# Patient Record
Sex: FEMALE | Race: WHITE | Employment: FULL TIME | URBAN - METROPOLITAN AREA
[De-identification: names, ages, dates, MRNs, and addresses within clinical notes are randomized per-mention and may not be internally consistent; named-entity substitution may affect disease eponyms.]

---

## 2017-03-14 PROBLEM — M75.42 IMPINGEMENT SYNDROME OF LEFT SHOULDER: Status: ACTIVE | Noted: 2017-03-14

## 2018-04-03 ENCOUNTER — TELEPHONE (OUTPATIENT)
Dept: ENT CLINIC | Age: 48
End: 2018-04-03

## 2018-04-03 ENCOUNTER — OFFICE VISIT (OUTPATIENT)
Dept: ENT CLINIC | Age: 48
End: 2018-04-03
Payer: COMMERCIAL

## 2018-04-03 VITALS
DIASTOLIC BLOOD PRESSURE: 80 MMHG | SYSTOLIC BLOOD PRESSURE: 138 MMHG | HEIGHT: 62 IN | WEIGHT: 178.2 LBS | HEART RATE: 81 BPM | BODY MASS INDEX: 32.79 KG/M2

## 2018-04-03 DIAGNOSIS — D10.39: Primary | ICD-10-CM

## 2018-04-03 DIAGNOSIS — J32.4 CHRONIC PANSINUSITIS: ICD-10-CM

## 2018-04-03 PROCEDURE — 99204 OFFICE O/P NEW MOD 45 MIN: CPT | Performed by: OTOLARYNGOLOGY

## 2018-04-03 ASSESSMENT — ENCOUNTER SYMPTOMS
VOMITING: 0
NAUSEA: 0
SPUTUM PRODUCTION: 0
SORE THROAT: 0
SINUS PAIN: 0

## 2018-04-06 RX ORDER — VALACYCLOVIR HYDROCHLORIDE 1 G/1
TABLET, FILM COATED ORAL
Qty: 4 TABLET | Refills: 5 | Status: SHIPPED | OUTPATIENT
Start: 2018-04-06 | End: 2018-11-05 | Stop reason: SDUPTHER

## 2018-10-11 ENCOUNTER — TELEPHONE (OUTPATIENT)
Dept: ENT CLINIC | Age: 48
End: 2018-10-11

## 2018-11-05 DIAGNOSIS — Z12.31 ENCOUNTER FOR SCREENING MAMMOGRAM FOR MALIGNANT NEOPLASM OF BREAST: Primary | ICD-10-CM

## 2018-11-05 RX ORDER — VALACYCLOVIR HYDROCHLORIDE 1 G/1
TABLET, FILM COATED ORAL
Qty: 4 TABLET | Refills: 5 | Status: SHIPPED | OUTPATIENT
Start: 2018-11-05 | End: 2019-04-10 | Stop reason: SDUPTHER

## 2018-11-20 ENCOUNTER — TELEPHONE (OUTPATIENT)
Dept: FAMILY MEDICINE CLINIC | Age: 48
End: 2018-11-20

## 2018-11-26 ENCOUNTER — TELEPHONE (OUTPATIENT)
Dept: FAMILY MEDICINE CLINIC | Age: 48
End: 2018-11-26

## 2018-11-26 DIAGNOSIS — Z12.39 SCREENING FOR BREAST CANCER: Primary | ICD-10-CM

## 2018-12-04 ENCOUNTER — OFFICE VISIT (OUTPATIENT)
Dept: FAMILY MEDICINE CLINIC | Age: 48
End: 2018-12-04
Payer: COMMERCIAL

## 2018-12-04 VITALS
DIASTOLIC BLOOD PRESSURE: 88 MMHG | HEART RATE: 67 BPM | WEIGHT: 183 LBS | BODY MASS INDEX: 33.68 KG/M2 | SYSTOLIC BLOOD PRESSURE: 128 MMHG | TEMPERATURE: 98.1 F | RESPIRATION RATE: 16 BRPM | OXYGEN SATURATION: 100 % | HEIGHT: 62 IN

## 2018-12-04 DIAGNOSIS — G89.29 CHRONIC PAIN OF LEFT KNEE: ICD-10-CM

## 2018-12-04 DIAGNOSIS — Z71.6 TOBACCO ABUSE COUNSELING: Primary | ICD-10-CM

## 2018-12-04 DIAGNOSIS — M25.562 CHRONIC PAIN OF LEFT KNEE: ICD-10-CM

## 2018-12-04 DIAGNOSIS — R63.5 WEIGHT GAIN: ICD-10-CM

## 2018-12-04 DIAGNOSIS — M17.12 PRIMARY OSTEOARTHRITIS OF LEFT KNEE: ICD-10-CM

## 2018-12-04 PROCEDURE — 99213 OFFICE O/P EST LOW 20 MIN: CPT | Performed by: FAMILY MEDICINE

## 2018-12-04 PROCEDURE — 20610 DRAIN/INJ JOINT/BURSA W/O US: CPT | Performed by: FAMILY MEDICINE

## 2018-12-04 RX ORDER — METHYLPREDNISOLONE ACETATE 80 MG/ML
80 INJECTION, SUSPENSION INTRA-ARTICULAR; INTRALESIONAL; INTRAMUSCULAR; SOFT TISSUE ONCE
Status: COMPLETED | OUTPATIENT
Start: 2018-12-04 | End: 2018-12-04

## 2018-12-04 RX ORDER — VARENICLINE TARTRATE 25 MG
KIT ORAL
Qty: 1 EACH | Refills: 2 | Status: SHIPPED | OUTPATIENT
Start: 2018-12-04 | End: 2019-04-10

## 2018-12-04 RX ADMIN — METHYLPREDNISOLONE ACETATE 80 MG: 80 INJECTION, SUSPENSION INTRA-ARTICULAR; INTRALESIONAL; INTRAMUSCULAR; SOFT TISSUE at 10:28

## 2018-12-04 ASSESSMENT — PATIENT HEALTH QUESTIONNAIRE - PHQ9
SUM OF ALL RESPONSES TO PHQ9 QUESTIONS 1 & 2: 0
SUM OF ALL RESPONSES TO PHQ QUESTIONS 1-9: 0
SUM OF ALL RESPONSES TO PHQ QUESTIONS 1-9: 0
1. LITTLE INTEREST OR PLEASURE IN DOING THINGS: 0
2. FEELING DOWN, DEPRESSED OR HOPELESS: 0

## 2018-12-04 ASSESSMENT — ENCOUNTER SYMPTOMS
APNEA: 0
CHEST TIGHTNESS: 0

## 2018-12-28 ENCOUNTER — HOSPITAL ENCOUNTER (OUTPATIENT)
Dept: MAMMOGRAPHY | Age: 48
Discharge: HOME | End: 2018-12-30
Payer: COMMERCIAL

## 2018-12-28 DIAGNOSIS — Z12.39 SCREENING FOR BREAST CANCER: ICD-10-CM

## 2018-12-28 PROCEDURE — 77063 BREAST TOMOSYNTHESIS BI: CPT

## 2019-01-04 ENCOUNTER — TELEPHONE (OUTPATIENT)
Dept: FAMILY MEDICINE CLINIC | Age: 49
End: 2019-01-04

## 2019-03-01 ENCOUNTER — TELEPHONE (OUTPATIENT)
Dept: FAMILY MEDICINE CLINIC | Age: 49
End: 2019-03-01

## 2019-03-16 ENCOUNTER — HOSPITAL ENCOUNTER (OUTPATIENT)
Age: 49
Discharge: HOME | End: 2019-03-16
Payer: COMMERCIAL

## 2019-03-16 DIAGNOSIS — R63.5 WEIGHT GAIN: ICD-10-CM

## 2019-03-16 LAB
ALBUMIN SERPL-MCNC: 4.3 G/DL (ref 3.5–5.2)
ALP BLD-CCNC: 84 U/L (ref 35–104)
ALT SERPL-CCNC: 33 U/L (ref 0–32)
ANION GAP SERPL CALCULATED.3IONS-SCNC: 10 MMOL/L (ref 7–16)
AST SERPL-CCNC: 30 U/L (ref 0–31)
BILIRUB SERPL-MCNC: 0.4 MG/DL (ref 0–1.2)
BUN BLDV-MCNC: 12 MG/DL (ref 6–20)
CALCIUM SERPL-MCNC: 9.3 MG/DL (ref 8.6–10.2)
CHLORIDE BLD-SCNC: 107 MMOL/L (ref 98–107)
CHOLESTEROL, TOTAL: 199 MG/DL (ref 0–199)
CO2: 26 MMOL/L (ref 22–29)
CREAT SERPL-MCNC: 0.8 MG/DL (ref 0.5–1)
GFR AFRICAN AMERICAN: >60
GFR NON-AFRICAN AMERICAN: >60 ML/MIN/1.73
GLUCOSE BLD-MCNC: 107 MG/DL (ref 74–99)
HDLC SERPL-MCNC: 85 MG/DL
LDL CHOLESTEROL CALCULATED: 104 MG/DL (ref 0–99)
POTASSIUM SERPL-SCNC: 4.3 MMOL/L (ref 3.5–5)
SODIUM BLD-SCNC: 143 MMOL/L (ref 132–146)
T4 FREE: 0.96 NG/DL (ref 0.93–1.7)
TOTAL PROTEIN: 7.8 G/DL (ref 6.4–8.3)
TRIGL SERPL-MCNC: 52 MG/DL (ref 0–149)
TSH SERPL DL<=0.05 MIU/L-ACNC: 0.89 UIU/ML (ref 0.27–4.2)
VLDLC SERPL CALC-MCNC: 10 MG/DL

## 2019-03-16 PROCEDURE — 84443 ASSAY THYROID STIM HORMONE: CPT

## 2019-03-16 PROCEDURE — 80061 LIPID PANEL: CPT

## 2019-03-16 PROCEDURE — 84439 ASSAY OF FREE THYROXINE: CPT

## 2019-03-16 PROCEDURE — 36415 COLL VENOUS BLD VENIPUNCTURE: CPT

## 2019-03-16 PROCEDURE — 80053 COMPREHEN METABOLIC PANEL: CPT

## 2019-04-02 ENCOUNTER — TELEPHONE (OUTPATIENT)
Dept: FAMILY MEDICINE CLINIC | Age: 49
End: 2019-04-02

## 2019-04-10 ENCOUNTER — OFFICE VISIT (OUTPATIENT)
Dept: FAMILY MEDICINE CLINIC | Age: 49
End: 2019-04-10
Payer: COMMERCIAL

## 2019-04-10 VITALS
BODY MASS INDEX: 34.23 KG/M2 | DIASTOLIC BLOOD PRESSURE: 80 MMHG | RESPIRATION RATE: 16 BRPM | WEIGHT: 186 LBS | HEART RATE: 73 BPM | OXYGEN SATURATION: 99 % | TEMPERATURE: 98.3 F | HEIGHT: 62 IN | SYSTOLIC BLOOD PRESSURE: 130 MMHG

## 2019-04-10 DIAGNOSIS — N60.19 FIBROCYSTIC BREAST DISEASE (FCBD), UNSPECIFIED LATERALITY: ICD-10-CM

## 2019-04-10 DIAGNOSIS — B00.1 RECURRENT COLD SORES: Primary | ICD-10-CM

## 2019-04-10 PROCEDURE — 99213 OFFICE O/P EST LOW 20 MIN: CPT | Performed by: FAMILY MEDICINE

## 2019-04-10 RX ORDER — VALACYCLOVIR HYDROCHLORIDE 1 G/1
TABLET, FILM COATED ORAL
Qty: 4 TABLET | Refills: 5 | Status: SHIPPED | OUTPATIENT
Start: 2019-04-10 | End: 2019-09-20 | Stop reason: SDUPTHER

## 2019-04-10 ASSESSMENT — PATIENT HEALTH QUESTIONNAIRE - PHQ9
1. LITTLE INTEREST OR PLEASURE IN DOING THINGS: 0
SUM OF ALL RESPONSES TO PHQ QUESTIONS 1-9: 1
2. FEELING DOWN, DEPRESSED OR HOPELESS: 1
SUM OF ALL RESPONSES TO PHQ9 QUESTIONS 1 & 2: 1
SUM OF ALL RESPONSES TO PHQ QUESTIONS 1-9: 1

## 2019-04-10 ASSESSMENT — ENCOUNTER SYMPTOMS
ABDOMINAL DISTENTION: 0
APNEA: 0
CHEST TIGHTNESS: 0
EYE DISCHARGE: 0
EYE ITCHING: 0

## 2019-04-10 NOTE — PROGRESS NOTES
HPI:  Patient comes in today for   Chief Complaint   Patient presents with    Breast Pain     tenderness under left arm, and sides of breasts. pt did have a mammogram   .          Review of Systems  Review of Systems   Constitutional: Negative for activity change and appetite change. Eyes: Negative for discharge and itching. Respiratory: Negative for apnea and chest tightness. Cardiovascular: Negative for chest pain. Gastrointestinal: Negative for abdominal distention. Musculoskeletal: Negative for arthralgias. Skin:        Left forearm lipoma       PE:  VS:  /80   Pulse 73   Temp 98.3 °F (36.8 °C) (Oral)   Resp 16   Ht 5' 2\" (1.575 m)   Wt 186 lb (84.4 kg)   SpO2 99%   BMI 34.02 kg/m²   Physical Exam   Constitutional: She appears well-developed. HENT:   Head: Normocephalic. Eyes: Pupils are equal, round, and reactive to light. Neck: Normal range of motion. No thyromegaly present. Cardiovascular: Normal rate. Pulmonary/Chest: Effort normal.   Abdominal: Soft. She exhibits no distension. There is no tenderness. Musculoskeletal: Normal range of motion. Neurological: She is alert. Skin: Skin is warm. Capillary refill takes less than 2 seconds. Psychiatric: She has a normal mood and affect. ASSESSMENT/PLAN:    1. Recurrent cold sores  - valACYclovir (VALTREX) 1 g tablet; take 2 tablets by mouth twice a day if needed  Dispense: 4 tablet; Refill: 5    2. Fibrocystic breast disease (FCBD), unspecified laterality               STEPHENIE Dent.

## 2019-09-20 DIAGNOSIS — B00.1 RECURRENT COLD SORES: ICD-10-CM

## 2019-09-20 RX ORDER — VALACYCLOVIR HYDROCHLORIDE 1 G/1
TABLET, FILM COATED ORAL
Qty: 4 TABLET | Refills: 0 | Status: SHIPPED | OUTPATIENT
Start: 2019-09-20 | End: 2019-11-20 | Stop reason: SDUPTHER

## 2019-11-20 DIAGNOSIS — B00.1 RECURRENT COLD SORES: ICD-10-CM

## 2019-11-20 RX ORDER — VALACYCLOVIR HYDROCHLORIDE 1 G/1
1000 TABLET, FILM COATED ORAL DAILY
Qty: 4 TABLET | Refills: 0 | Status: SHIPPED | OUTPATIENT
Start: 2019-11-20 | End: 2019-12-13 | Stop reason: SDUPTHER

## 2019-12-13 DIAGNOSIS — B00.1 RECURRENT COLD SORES: ICD-10-CM

## 2019-12-13 RX ORDER — VALACYCLOVIR HYDROCHLORIDE 1 G/1
1000 TABLET, FILM COATED ORAL DAILY
Qty: 4 TABLET | Refills: 0 | Status: SHIPPED | OUTPATIENT
Start: 2019-12-13 | End: 2020-01-07 | Stop reason: SDUPTHER

## 2020-01-07 ENCOUNTER — OFFICE VISIT (OUTPATIENT)
Dept: FAMILY MEDICINE CLINIC | Age: 50
End: 2020-01-07
Payer: COMMERCIAL

## 2020-01-07 VITALS
SYSTOLIC BLOOD PRESSURE: 126 MMHG | WEIGHT: 189 LBS | TEMPERATURE: 98.3 F | DIASTOLIC BLOOD PRESSURE: 72 MMHG | RESPIRATION RATE: 14 BRPM | HEIGHT: 62 IN | HEART RATE: 87 BPM | OXYGEN SATURATION: 97 % | BODY MASS INDEX: 34.78 KG/M2

## 2020-01-07 PROCEDURE — 99396 PREV VISIT EST AGE 40-64: CPT | Performed by: NURSE PRACTITIONER

## 2020-01-07 RX ORDER — VALACYCLOVIR HYDROCHLORIDE 1 G/1
1000 TABLET, FILM COATED ORAL DAILY
Qty: 4 TABLET | Refills: 3 | Status: SHIPPED
Start: 2020-01-07 | End: 2020-04-03

## 2020-01-07 ASSESSMENT — PATIENT HEALTH QUESTIONNAIRE - PHQ9
SUM OF ALL RESPONSES TO PHQ QUESTIONS 1-9: 0
1. LITTLE INTEREST OR PLEASURE IN DOING THINGS: 0
2. FEELING DOWN, DEPRESSED OR HOPELESS: 0
SUM OF ALL RESPONSES TO PHQ QUESTIONS 1-9: 0
SUM OF ALL RESPONSES TO PHQ9 QUESTIONS 1 & 2: 0

## 2020-01-07 NOTE — PROGRESS NOTES
Annual Physical      Dahiana Moran is here for routine yearly physical/preventative visit. Patient has no specific complaints  today. Patient is  generally healthy. Health maintenance reviewed with patient and is not up to date. Patient's past medical, surgical, social and/or family history reviewed, updated in chart, and are non-contributory (unless otherwise stated). Medications and allergies also reviewed and updated in chart. Complains of chronic left knee pain. States recently her knee has been giving out when she is walking. Problem List  Patient Active Problem List   Diagnosis    Knee pain, bilateral    Recurrent cold sores    Maxillary sinusitis    Midline low back pain without sciatica    Tobacco abuse counseling    Chronic pain of left knee    Numbness and tingling of right leg    Impingement syndrome of left shoulder       Prior to Visit Medications    Medication Sig Taking?  Authorizing Provider   valACYclovir (VALTREX) 1 g tablet Take 1 tablet by mouth daily Yes RAPHAEL Calvo - CNP       No Known Allergies    Review of Systems:    All positives are bold    Constitutional:  No fever, no fatigue, no chills, no headaches,  weight change  Dermatology:  No rash, mole-left aereola getting larger, no dry or sensitive skin  ENT:  No cough, no sore throat, no sinus pain, no runny nose, no ear pain  Cardiology:  No chest pain, no palpitations,  leg edema, no shortness of breath, no PND  Respiratory:  No shortness of breath, no orthopnea, no wheezing, no HOGAN, no hemoptysis  Endocrinology:  No polydipsia, no polyuria, no cold intolerance, no heat intolerance, no polyphagia, no hair changes  Gastroenterology:  No dysphagia, no abdominal pain, no nausea, no vomiting, no constipation, no diarrhea,  heartburn  Musculoskeletal:  joint pain, leg cramps, no back pain, no muscle aches  Urology:  No blood in the urine, no urinary frequency, no urinary incontinence, no urinary urgency, no nocturia, no dysuria  Female Reproductive:  No hot flashes, no abnormal vaginal discharge, no pain with menstruation, no pelvic pain  Neurology:  Numbness/tingling third, fourth and fifth toes right foot, no dizziness, no weakness  Psychology:  No depression, no sleep disturbances, no suicidal ideation, no anxiety    Physical Examination      /72   Pulse 87   Temp 98.3 °F (36.8 °C) (Oral)   Resp 14   Ht 5' 2\" (1.575 m)   Wt 189 lb (85.7 kg)   SpO2 97%   BMI 34.57 kg/m²     PHYSICAL EXAMINATION:    General Appearance: alert and oriented to person, place and time, well developed and well- nourished, in no acute distress  Skin: warm and dry, no rash or erythema. Lipoma to left forearm.  Seborrheic keratosis left breast  Head: normocephalic and atraumatic  Eyes: pupils equal, round, and reactive to light, extraocular eye movements intact, conjunctivae normal  ENT: tympanic membrane, external ear and ear canal normal bilaterally, nose without deformity, nasal mucosa and turbinates normal without polyps  Neck: supple and non-tender without mass, no thyromegaly or thyroid nodules, no cervical lymphadenopathy  Pulmonary/Chest: clear to auscultation bilaterally- no wheezes, rales or rhonchi, normal air movement, no respiratory distress  Cardiovascular: normal rate, regular rhythm, normal S1 and S2, no murmurs, rubs, clicks, or gallops, distal pulses intact, no carotid bruits  Abdomen: soft, non-tender, non-distended, normal bowel sounds, no masses or organomegaly  Extremities: no cyanosis, clubbing or edema  Musculoskeletal: left knee is tender along medial joint line, positive Sonya's, negative drawer sign  Neurologic: reflexes normal and symmetric, no cranial nerve deficit, gait, coordination and speech normal           Dx: Z00.00       Plan:  Brenda Kendrick was seen today for annual exam.    Diagnoses and all orders for this visit:    Routine general medical examination at a health care facility  -     [facility] (For External

## 2020-01-15 ENCOUNTER — TELEPHONE (OUTPATIENT)
Dept: FAMILY MEDICINE CLINIC | Age: 50
End: 2020-01-15

## 2020-01-28 ENCOUNTER — HOSPITAL ENCOUNTER (OUTPATIENT)
Dept: MAMMOGRAPHY | Age: 50
Discharge: HOME | End: 2020-01-30
Payer: COMMERCIAL

## 2020-01-28 PROCEDURE — 77063 BREAST TOMOSYNTHESIS BI: CPT

## 2020-02-17 ENCOUNTER — TELEPHONE (OUTPATIENT)
Dept: FAMILY MEDICINE CLINIC | Age: 50
End: 2020-02-17

## 2020-03-13 ENCOUNTER — TELEPHONE (OUTPATIENT)
Dept: FAMILY MEDICINE CLINIC | Age: 50
End: 2020-03-13

## 2020-04-03 RX ORDER — VALACYCLOVIR HYDROCHLORIDE 1 G/1
TABLET, FILM COATED ORAL
Qty: 4 TABLET | Refills: 3 | Status: SHIPPED
Start: 2020-04-03 | End: 2020-12-17

## 2020-04-06 ENCOUNTER — TELEPHONE (OUTPATIENT)
Dept: FAMILY MEDICINE CLINIC | Age: 50
End: 2020-04-06

## 2020-04-13 ENCOUNTER — TELEPHONE (OUTPATIENT)
Dept: FAMILY MEDICINE CLINIC | Age: 50
End: 2020-04-13

## 2020-05-05 ENCOUNTER — TELEPHONE (OUTPATIENT)
Dept: FAMILY MEDICINE CLINIC | Age: 50
End: 2020-05-05

## 2020-06-03 ENCOUNTER — TELEPHONE (OUTPATIENT)
Dept: FAMILY MEDICINE CLINIC | Age: 50
End: 2020-06-03

## 2020-06-04 ENCOUNTER — OFFICE VISIT (OUTPATIENT)
Dept: SURGERY | Age: 50
End: 2020-06-04
Payer: COMMERCIAL

## 2020-06-04 VITALS
WEIGHT: 180 LBS | RESPIRATION RATE: 20 BRPM | OXYGEN SATURATION: 97 % | TEMPERATURE: 98.4 F | SYSTOLIC BLOOD PRESSURE: 138 MMHG | BODY MASS INDEX: 31.89 KG/M2 | HEART RATE: 83 BPM | HEIGHT: 63 IN | DIASTOLIC BLOOD PRESSURE: 84 MMHG

## 2020-06-04 PROCEDURE — 99203 OFFICE O/P NEW LOW 30 MIN: CPT | Performed by: PLASTIC SURGERY

## 2020-06-04 NOTE — PROGRESS NOTES
Department of Plastic Surgery - Adult  Attending Consult Note      CHIEF COMPLAINT:   Mass of left forearm    History Obtained From:  patient    HISTORY OF PRESENT ILLNESS:                The patient is a 44 y.o. female who presents with left forearm subcutaneous mass. The patient states that they first noticed the mass several years ago. It has  grown in size since they first noticed the mass. The mass has not had a history of discharge. The pt has not had the mass biopsied previously. The patient states the mass is  painfull. The pt denies any associated symptoms. Past Medical History:    Past Medical History:   Diagnosis Date    Chronic back pain     Knee pain, bilateral     left knee pain     Past Surgical History:    Past Surgical History:   Procedure Laterality Date    APPENDECTOMY       SECTION       Current Medications:      Current Outpatient Medications   Medication Sig Dispense Refill    valACYclovir (VALTREX) 1 g tablet take 1 tablet by mouth once daily 4 tablet 3     No current facility-administered medications for this visit. Allergies:  Patient has no known allergies. Social History:   Social History     Socioeconomic History    Marital status:      Spouse name: Not on file    Number of children: Not on file    Years of education: Not on file    Highest education level: Not on file   Occupational History    Not on file   Social Needs    Financial resource strain: Not on file    Food insecurity     Worry: Not on file     Inability: Not on file   [employer] needs     Medical: Not on file     Non-medical: Not on file   Tobacco Use    Smoking status: Current Every Day Smoker     Packs/day: 1.00     Years: 20.00     Pack years: 20.00     Types: Cigarettes    Smokeless tobacco: Never Used   Substance and Sexual Activity    Alcohol use:  Yes     Alcohol/week: 0.0 standard drinks     Comment: social    Drug use: No    Sexual activity: Not on file Lifestyle    Physical activity     Days per week: Not on file     Minutes per session: Not on file    Stress: Not on file   Relationships    Social connections     Talks on phone: Not on file     Gets together: Not on file     Attends Islam service: Not on file     Active member of club or organization: Not on file     Attends meetings of clubs or organizations: Not on file     Relationship status: Not on file    Intimate partner violence     Fear of current or ex partner: Not on file     Emotionally abused: Not on file     Physically abused: Not on file     Forced sexual activity: Not on file   Other Topics Concern    Not on file   Social History Narrative    Not on file     Family History:   Family History   Problem Relation Age of Onset    No Known Problems Father        REVIEW OF SYSTEMS:    CONSTITUTIONAL:  negative for  fevers, chills, sweats and fatigue  EYES: negative for dipolpia or acute vision loss. RESPIRATORY:  negative for  dry cough, cough with sputum, dyspnea, wheezing and chest pain  HENT:negative for pain, headache, difficulty swallowing or nose bleeds. CARDIOVASCULAR:  negative for  chest pain, dyspnea, palpitations, syncope  GASTROINTESTINAL:  negative for nausea, vomiting, change in bowel habits, diarrhea, constipation and abdominal pain  EXTREMITIES: negative for edema  MUSCULOSKELETAL: negative for muscle weakness  SKIN: positive for lesionnegative for itching or rashes. HEME: negative for easy brusing or bleeding  BEHAVIOR/PSYCH:  negative for poor appetite, increased appetite, decreased sleep and poor concentration  PSYCH: Alert and oriented to person place and time    I have reviewed the chief complaint and history of present illness including (ROS and PFSH) and vital documentation by my staff and I agree with their documentation and have added when applicable.       PHYSICAL EXAM:          PHYSICAL EXAM:    VITALS:  /84 (Site: Left Upper Arm, Position: Sitting, Cuff -The patient will not require pre-op clearance from their PCP. Surgical Plan: Excisional biopsy of left forearm subcutaneous mass in the office under local anesthesia    Photos obtained      This document is generated, in part, by voice recognition software and thus  syntax and grammatical errors are possible.     Karrie Gonzalez  8:24 AM  6/11/2020

## 2020-06-10 ENCOUNTER — TELEPHONE (OUTPATIENT)
Dept: SURGERY | Age: 50
End: 2020-06-10

## 2020-07-02 ENCOUNTER — TELEPHONE (OUTPATIENT)
Dept: FAMILY MEDICINE CLINIC | Age: 50
End: 2020-07-02

## 2020-07-22 NOTE — PROGRESS NOTES
Subjective: Follow up today for excisional biopsy of left forearm subcutaneous mass. Denies fever, nausea, vomiting, leg pain or swelling. The patient voices understanding of the procedure they are having today and would like to proceed. Objective: There were no vitals taken for this visit. Left forearm subcutaneous mass  Lesion- 30mm x 30mm  Margin- 2mm  Defect- 30mm x 20mm  Scar-30mm         Assessment:    Patient Active Problem List   Diagnosis    Knee pain, bilateral    Recurrent cold sores    Maxillary sinusitis    Midline low back pain without sciatica    Tobacco abuse counseling    Chronic pain of left knee    Numbness and tingling of right leg    Impingement syndrome of left shoulder       Plan:       Diagnosis  -) Left forearm subcutaneous mass      -The risks, benefits and options were discussed with the pt. The risks included but not limited to pain, bleeding, infection, heavy scarring, damage to surrounding structures, fluid collections, asymmetry, and need for further procedures. All of Her questions were answered to their satisfaction and She agrees to proceed with the procedure.      -After consent was obtained, the area was cleansed with an alcohol swab. Local anesthesia consisting of 1% lidocaine with 1:100,000 epinepherine was injected  surrounding the area. The local was allowed to work. Using a 10-blade the Left forearm subcutaneous mass was excised,  intermediate repair was performed. The wound(s) were closed with 3-0 monocryl and 4-0 monocryl hemostasis was obtained and a steristrip with tegaderm dressing was placed over the wound. The procedure was performed by Dr Johnson Reasons     Please schedule an appointment to be seen on Follow-up with our office in 7-14 days  Diet: regular diet  Activity: no heavy lifting for 7 days. Shower/Bathing: OK to shower over the wound site in 48 hours. No baths, hot tubs or soaking of the wound site at this time.   Pt voices understanding. Wound care: There is a dressing in place steri strips and Tegaderm over the wound site. This will remain intact until seen in our office. Medications: As prescribed  Educated to contact physician with concerns or signs of infection (redness, increasing pain, discharge, odor, fever). The entirety of the procedure was performed by Dr. Ritu Lombardi    Please note that the medical decision making for the patient as well as the subjective, objective, assessment and plan were reviewed and performed by Dr Ritu Lombardi        This document is generated, in part, by voice recognition software and thus  syntax and grammatical errors are possible.     Cindi Banuelos  9:32 AM  8/6/2020

## 2020-07-23 ENCOUNTER — OFFICE VISIT (OUTPATIENT)
Dept: FAMILY MEDICINE CLINIC | Age: 50
End: 2020-07-23
Payer: COMMERCIAL

## 2020-07-23 VITALS
DIASTOLIC BLOOD PRESSURE: 86 MMHG | SYSTOLIC BLOOD PRESSURE: 120 MMHG | HEIGHT: 62 IN | BODY MASS INDEX: 34.96 KG/M2 | TEMPERATURE: 97.7 F | HEART RATE: 81 BPM | WEIGHT: 190 LBS | OXYGEN SATURATION: 97 % | RESPIRATION RATE: 16 BRPM

## 2020-07-23 PROCEDURE — 99213 OFFICE O/P EST LOW 20 MIN: CPT | Performed by: FAMILY MEDICINE

## 2020-07-23 PROCEDURE — 20610 DRAIN/INJ JOINT/BURSA W/O US: CPT | Performed by: FAMILY MEDICINE

## 2020-07-23 RX ORDER — METHYLPREDNISOLONE ACETATE 80 MG/ML
80 INJECTION, SUSPENSION INTRA-ARTICULAR; INTRALESIONAL; INTRAMUSCULAR; SOFT TISSUE ONCE
Status: COMPLETED | OUTPATIENT
Start: 2020-07-23 | End: 2020-07-23

## 2020-07-23 RX ORDER — HYDROCODONE BITARTRATE AND ACETAMINOPHEN 5; 325 MG/1; MG/1
1 TABLET ORAL EVERY 6 HOURS PRN
Qty: 28 TABLET | Refills: 0 | Status: SHIPPED | OUTPATIENT
Start: 2020-07-23 | End: 2020-07-30

## 2020-07-23 RX ADMIN — METHYLPREDNISOLONE ACETATE 80 MG: 80 INJECTION, SUSPENSION INTRA-ARTICULAR; INTRALESIONAL; INTRAMUSCULAR; SOFT TISSUE at 09:48

## 2020-07-23 ASSESSMENT — ENCOUNTER SYMPTOMS
SHORTNESS OF BREATH: 0
WHEEZING: 0
CONSTIPATION: 0
ABDOMINAL PAIN: 0
DIARRHEA: 0
BLOOD IN STOOL: 0
APNEA: 0
COUGH: 0
VOMITING: 0
NAUSEA: 0

## 2020-07-23 NOTE — PROGRESS NOTES
HPI:  Patient comes in today for   Chief Complaint   Patient presents with    Knee Pain     pt. presents for left knee pain. patient says the pain came out of nowhere. describes as stabing pain radiating below and above knee. pt. had xray of knee this morning. Review of Systems  Review of Systems   Constitutional: Negative for chills, diaphoresis and fever. HENT: Negative for ear discharge, ear pain, hearing loss, nosebleeds and tinnitus. Respiratory: Negative for apnea, cough, shortness of breath and wheezing. Cardiovascular: Negative for chest pain. Gastrointestinal: Negative for abdominal pain, blood in stool, constipation, diarrhea, nausea and vomiting. Genitourinary: Negative for dysuria, flank pain and hematuria. Musculoskeletal: Positive for arthralgias (Has been having pain in this knee for at least 5 years, off and on.). Negative for myalgias. Skin: Negative for rash. Neurological: Negative for headaches. Hematological: Does not bruise/bleed easily. Psychiatric/Behavioral: Negative for hallucinations and suicidal ideas. PE[de-identified]  /86   Pulse 81   Temp 97.7 °F (36.5 °C) (Temporal)   Resp 16   Ht 5' 2\" (1.575 m)   Wt 190 lb (86.2 kg)   SpO2 97%   BMI 34.75 kg/m²       Physical Exam  Constitutional:       Appearance: Normal appearance. She is well-developed. HENT:      Head: Normocephalic and atraumatic. Eyes:      General: No scleral icterus. Conjunctiva/sclera: Conjunctivae normal.      Pupils: Pupils are equal, round, and reactive to light. Neck:      Musculoskeletal: Neck supple. Thyroid: No thyromegaly. Vascular: No JVD. Trachea: No tracheal deviation. Cardiovascular:      Rate and Rhythm: Normal rate and regular rhythm. Heart sounds: Normal heart sounds. No murmur. No gallop. Pulmonary:      Effort: Pulmonary effort is normal. No respiratory distress. Breath sounds: Normal breath sounds. No wheezing.    Abdominal: Palpations: Abdomen is soft. Musculoskeletal:         General: No tenderness. Comments: Left knee painful at the Medial collateral, and along the medial joint line. ACL is tight on both sides. Skin:     Capillary Refill: Capillary refill takes less than 2 seconds. Findings: No erythema or rash. Neurological:      General: No focal deficit present. Mental Status: She is alert and oriented to person, place, and time. Deep Tendon Reflexes: Reflexes normal.      Comments:              Procedure:  Intra-Articular Injection left Knee:    The patient was counseled on the options for treating the affected knee. These include but were not limited to trail of oral anti-inflammatories, oral steroids, physical therapy referral, or ortho consultation. The patient is electing injection. The risks of the injection were explained, including but not limited to infection, bleeding, bruising, tendon injury, skin contour deformity and soft tissue/fat necrosis. The patient gave verbal permission to proceed. The patient was placed in a seated position. The skin of [facility] was prepped with Betadine swabs. It was allowed to dry. Utilizing a  Lateral approach an injection of 80 mg of  DepoMedrol and 1 cc of 1% lidocaine without epinephrine and 0.5 cc of 0.25% Bupivacaine,was injected into the left KNEE JOINT after first aspirating to assure no blood return. The injection site was then wiped again with Betadine and covered with a band aid. The procedure was tolerated well. Routine wound instructions given verbally to the patient. Advised to notify if bleeding, excessive bruising, or swelling develop. Surgeon: Lisandro Bennett D.O.  ASSESSMENT/PLAN:    1. Chronic pain of left knee  - AL ARTHROCENTESIS ASPIR&/INJ MAJOR JT/BURSA W/O US    2.  Internal derangement of left knee  - AL ARTHROCENTESIS ASPIR&/INJ MAJOR JT/BURSA W/O US  - methylPREDNISolone acetate (DEPO-MEDROL) injection 80 mg  - MRI KNEE LEFT WO CONTRAST; Future    Controlled substances monitoring: no signs of potential drug abuse or diversion identified and [place] report reviewed today- activity consistent with treatment plan. STEPHENIE Marlow.

## 2020-07-31 ENCOUNTER — HOSPITAL ENCOUNTER (OUTPATIENT)
Age: 50
Discharge: HOME | End: 2020-08-02
Payer: COMMERCIAL

## 2020-07-31 ENCOUNTER — PROCEDURE VISIT (OUTPATIENT)
Dept: SURGERY | Age: 50
End: 2020-07-31
Payer: COMMERCIAL

## 2020-07-31 VITALS
HEART RATE: 78 BPM | WEIGHT: 185 LBS | TEMPERATURE: 98.4 F | OXYGEN SATURATION: 98 % | BODY MASS INDEX: 34.04 KG/M2 | HEIGHT: 62 IN | RESPIRATION RATE: 18 BRPM

## 2020-07-31 PROCEDURE — 88304 TISSUE EXAM BY PATHOLOGIST: CPT

## 2020-07-31 PROCEDURE — 25071 EXC FOREARM LES SC 3 CM/>: CPT | Performed by: PLASTIC SURGERY

## 2020-07-31 NOTE — PROGRESS NOTES
Lidocaine HCI 1% and Epinephrine 1:100,000 Injection,USP  Lot:     [id number]               Expiration:6/21  XMY:[id number]

## 2020-08-03 ENCOUNTER — TELEPHONE (OUTPATIENT)
Dept: FAMILY MEDICINE CLINIC | Age: 50
End: 2020-08-03

## 2020-08-03 DIAGNOSIS — M25.561 CHRONIC PAIN OF BOTH KNEES: Primary | ICD-10-CM

## 2020-08-03 DIAGNOSIS — M25.562 CHRONIC PAIN OF BOTH KNEES: Primary | ICD-10-CM

## 2020-08-03 DIAGNOSIS — M23.92 INTERNAL DERANGEMENT OF LEFT KNEE: ICD-10-CM

## 2020-08-03 DIAGNOSIS — G89.29 CHRONIC PAIN OF BOTH KNEES: Primary | ICD-10-CM

## 2020-08-04 ENCOUNTER — TELEPHONE (OUTPATIENT)
Dept: FAMILY MEDICINE CLINIC | Age: 50
End: 2020-08-04

## 2020-08-04 NOTE — TELEPHONE ENCOUNTER
MRI was denied by insurance pt would need to try PT for at least 6 weeks before insurance would cover MRI.

## 2020-08-06 RX ORDER — CEPHALEXIN 500 MG/1
500 CAPSULE ORAL 4 TIMES DAILY
Qty: 20 CAPSULE | Refills: 0 | Status: SHIPPED | OUTPATIENT
Start: 2020-08-06 | End: 2020-08-11

## 2020-08-06 RX ORDER — ACETAMINOPHEN AND CODEINE PHOSPHATE 300; 30 MG/1; MG/1
1 TABLET ORAL EVERY 4 HOURS PRN
Qty: 10 TABLET | Refills: 0 | Status: SHIPPED | OUTPATIENT
Start: 2020-08-06 | End: 2020-08-11

## 2020-08-06 RX ORDER — LIDOCAINE HYDROCHLORIDE AND EPINEPHRINE 10; 10 MG/ML; UG/ML
3 INJECTION, SOLUTION INFILTRATION; PERINEURAL ONCE
Status: SHIPPED | OUTPATIENT
Start: 2020-08-06

## 2020-08-06 NOTE — PROGRESS NOTES
Subjective: Follow up today from excisional biopsy of left forearm subcutaneous mass. Denies fever, nausea, vomiting, leg pain or swelling, pain is absent. The pt states that they have  kept the wound site(s) covered with tegaderm. The patient states that they have  finished their antibiotic. They state that their pain is absent. Objective: There were no vitals taken for this visit. Wound: Clean, dry, and intact, no drainage. No signs of infection, wound healing well    Neuro- Sensation  intact to kimi incisional site with light touch .      Assessment:    Patient Active Problem List   Diagnosis    Knee pain, bilateral    Recurrent cold sores    Maxillary sinusitis    Midline low back pain without sciatica    Tobacco abuse counseling    Chronic pain of left knee    Numbness and tingling of right leg    Impingement syndrome of left shoulder       Labs: CBC: No results found for: WBC, RBC, HGB, HCT, MCV, MCH, MCHC, RDW, PLT, MPV  BMP:    Lab Results   Component Value Date     03/16/2019    K 4.3 03/16/2019     03/16/2019    CO2 26 03/16/2019    BUN 12 03/16/2019    LABALBU 4.3 03/16/2019    CREATININE 0.8 03/16/2019    CALCIUM 9.3 03/16/2019    GFRAA >60 03/16/2019    LABGLOM >60 03/16/2019    GLUCOSE 107 03/16/2019         Pathology Report- [address]        [place] [place] [address]     [address]            [address]   [place], [address], [address] [place] [address]               FINAL SURGICAL PATHOLOGY REPORT     NAME:           Treva Rocha          Date of       07/31/2020                                            Collection:   Medical Record   [id number]              Date of       08/03/2020   Number:                                  Receipt:   Age:  46 Y        Sex:  F                Date          08/05/2020 10:18                                            Reported:   Date Of Birth:   1970   Financial        [id number]             Admitting     LINSEY MELGAR   Number:                                  Physician:   Patient          OMAR                   Ordering      LINSEY MELGAR   Location:                                Physician:     Accession Number:  [id number]-                                       Additional Physicians:RAMAN MCCANN       Diagnosis:   Mass, left arm:   Mature adipose tissue, consistent with lipoma. Plan:     Educated the pt on their pathology report. Pt voices understanding      Dressing -None  Showering at this time -OK    Pt was instructed on scar massage  Scar Care Instructions      Sunscreen Recommendations for Scars   We recommend that all patients use a sunscreen when outside but especially on new scars (that are exposed to sunlight) for a year after their procedure.  The SPF should be at least 28. Follow the application directions on the bottle. Why is it so Important to Use Sunscreen on Scars?  A scar is new and more fragile than the surrounding skin.  If you do not use sunscreen, the scar line will react differently to the sun than the surrounding skin.  If you don't use sunscreen, the scar tissue will become darker than surrounding skin. This is a hyper-pigmented scar and will remain darker than the other skin.  After one year, the scar and surrounding skin should react equally to sun. Superficial Scar Massage   Scar massage desensitizes and reduces scar adhesions so skin glides freely.     Rub in a circular motion on and around the scar with firm, even pressure for 5 minutes four times per day    You can start scar massage once incision is completely healed and strong enough to handle the motion (usually 10 - 14

## 2020-08-07 ENCOUNTER — OFFICE VISIT (OUTPATIENT)
Dept: SURGERY | Age: 50
End: 2020-08-07

## 2020-08-07 VITALS
BODY MASS INDEX: 34.96 KG/M2 | OXYGEN SATURATION: 96 % | HEIGHT: 62 IN | SYSTOLIC BLOOD PRESSURE: 132 MMHG | DIASTOLIC BLOOD PRESSURE: 80 MMHG | WEIGHT: 190 LBS | TEMPERATURE: 97.5 F

## 2020-08-07 PROCEDURE — 99024 POSTOP FOLLOW-UP VISIT: CPT | Performed by: PHYSICIAN ASSISTANT

## 2020-08-18 ENCOUNTER — OFFICE VISIT (OUTPATIENT)
Dept: ORTHOPEDIC SURGERY | Age: 50
End: 2020-08-18
Payer: COMMERCIAL

## 2020-08-18 VITALS — TEMPERATURE: 98 F | BODY MASS INDEX: 31.58 KG/M2 | HEIGHT: 64 IN | WEIGHT: 185 LBS

## 2020-08-18 PROCEDURE — 99203 OFFICE O/P NEW LOW 30 MIN: CPT | Performed by: ORTHOPAEDIC SURGERY

## 2020-08-18 NOTE — PROGRESS NOTES
insecurity     Worry: Not on file     Inability: Not on file    Transportation needs     Medical: Not on file     Non-medical: Not on file   Tobacco Use    Smoking status: Current Every Day Smoker     Packs/day: 1.00     Years: 20.00     Pack years: 20.00     Types: Cigarettes    Smokeless tobacco: Never Used   Substance and Sexual Activity    Alcohol use: Yes     Alcohol/week: 0.0 standard drinks     Comment: social    Drug use: No    Sexual activity: Not on file   Lifestyle    Physical activity     Days per week: Not on file     Minutes per session: Not on file    Stress: Not on file   Relationships    Social connections     Talks on phone: Not on file     Gets together: Not on file     Attends Holiness service: Not on file     Active member of club or organization: Not on file     Attends meetings of clubs or organizations: Not on file     Relationship status: Not on file    Intimate partner violence     Fear of current or ex partner: Not on file     Emotionally abused: Not on file     Physically abused: Not on file     Forced sexual activity: Not on file   Other Topics Concern    Not on file   Social History Narrative    Not on file     Family History   Problem Relation Age of Onset    No Known Problems Father          REVIEW OF SYSTEMS:     General/Constitution:  (-)weight loss, (-)fever, (-)chills, (-)weakness. Skin: (-) rash,(-) psoriasis,(-) eczema, (-)skin cancer. Musculoskeletal: (-) fractures,  (-) dislocations,(-) collagen vascular disease, (-) fibromyalgia, (-) multiple sclerosis, (-) muscular dystrophy, (-) RSD,(-) joint pain (-)swelling, (-) joint pain,swelling. Neurologic: (-) epilepsy, (-)seizures,(-) brain tumor,(-) TIA, (-)stroke, (-)headaches, (-)Parkinson disease,(-) memory loss, (-) LOC. Cardiovascular: (-) Chest pain, (-) swelling in legs/feet, (-) SOB, (-) cramping in legs/feet with walking. Respiratory: (-) SOB, (-) Coughing, (-) night sweats.   GI: (-) nausea, (-) vomiting, (-) diarrhea, (-) blood in stool, (-) gastric ulcer. Psychiatric: (-) Depression, (-) Anxiety, (-) bipolar disease, (-) Alzheimer's Disease  Allergic/Immunologic: (-) allergies latex, (-) allergies metal, (-) skin sensitivity. Hematlogic: (-) anemia, (-) blood transfusion, (-) DVT/PE, (-) Clotting disorders    Subjective:    Constitution:  Temp 98 °F (36.7 °C)   Ht 5' 4\" (1.626 m)   Wt 185 lb (83.9 kg)   BMI 31.76 kg/m²     Psycihatric:  The patient is alert and oriented x 3, appears to be stated age and in no distress. Respiratory:  Respiratory effort is not labored. Patient is not gasping. Palpation of the chest reveals no tactile fremitus. Skin:  Upon inspection: the skin appears warm, dry and intact. There is  a previous scar over the affected area. There is any cellulitis, lymphedema or cutaneous lesions noted in the lower extremities. Upon palpation there is no induration noted. Neurologic:  Gait: antalgic; Motor exam of the lower extremities show ; quadriceps, hamstrings, foot dorsi and plantar flexors intact R.  5/5 and L. 5/5. Deep tendon reflexes are 2/4 at the knees and 2/4 at the ankles with strong extensor hallicus longus motor strength bilaterally. Sensory to both feet is intact to all sensory roots. Cardiovascular: The vascular exam is normal and is well perfused to distal extremities. Distal pulses DP/PT: R. 2+; L. 2+. There is cap refill noted less than two seconds in all digits. There is not edema of the bilateral lower extremities. There is not varicosities noted in the distal extremities. Lymph:  Upon palpation,  there is no lymphadenopathy noted in bilateral lower extremities. Musculoskeletal:  Gait: antalgic; examination of the nails and digits reveal no cyanosis or clubbing. Lumbar exam:  On visual inspection, there is not deformity of the spine. full range of motion, no tenderness, palpable spasm or pain on motion.  Special tests: Straight

## 2020-08-28 ENCOUNTER — NURSE ONLY (OUTPATIENT)
Dept: ORTHOPEDIC SURGERY | Age: 50
End: 2020-08-28
Payer: COMMERCIAL

## 2020-08-28 PROCEDURE — 20610 DRAIN/INJ JOINT/BURSA W/O US: CPT | Performed by: NURSE PRACTITIONER

## 2020-08-28 NOTE — PROGRESS NOTES
Chief Complaint   Patient presents with    Injections     left knee orthovisc #1       Verbal and written consent was obtained by the patient. The following is a well known patient to me that is here for Orthovisc #4, Her knee was prepped in sterile fashion. Orthovisc 15 mg/ml injected into the Left . The patient tolerated the injection well and I will see the patient back as needed    Marta Adriana was seen today for injections.     Diagnoses and all orders for this visit:    Primary osteoarthritis of left knee  -     20610 - [place] DRAIN/INJECT LARGE JOINT/BURSA    Other orders  -     hyaluronan (ORTHOVISC) 30 MG/2ML injection 30 mg

## 2020-09-03 ENCOUNTER — OFFICE VISIT (OUTPATIENT)
Dept: ORTHOPEDIC SURGERY | Age: 50
End: 2020-09-03
Payer: COMMERCIAL

## 2020-09-03 PROCEDURE — 20610 DRAIN/INJ JOINT/BURSA W/O US: CPT | Performed by: ORTHOPAEDIC SURGERY

## 2020-09-03 NOTE — PROGRESS NOTES
Chief Complaint   Patient presents with    Injections     left knee orthovisc#2       Verbal and written consent was obtained by the patient. The following is a well known patient to me that is here for Orthovisc #2, Her knee was prepped in sterile fashion. Orthovisc 15 mg/ml injected into the Left . The patient tolerated the injection well and I will see the patient back in 1 week    Jacklyn Rosen was seen today for injections.     Diagnoses and all orders for this visit:    Primary osteoarthritis of left knee  -     MD ARTHROCENTESIS ASPIR&/INJ MAJOR JT/BURSA W/O US    Other orders  -     hyaluronan (ORTHOVISC) 30 MG/2ML injection 30 mg

## 2020-09-10 ENCOUNTER — OFFICE VISIT (OUTPATIENT)
Dept: ORTHOPEDIC SURGERY | Age: 50
End: 2020-09-10
Payer: COMMERCIAL

## 2020-09-10 PROCEDURE — 20610 DRAIN/INJ JOINT/BURSA W/O US: CPT | Performed by: ORTHOPAEDIC SURGERY

## 2020-09-10 NOTE — PROGRESS NOTES
Chief Complaint   Patient presents with    Injections     left knee orthovisc #3       Verbal and written consent was obtained by the patient. The following is a well known patient to me that is here for Orthovisc #3, Her knee was prepped in sterile fashion. Orthovisc 15 mg/ml injected into the Left . The patient tolerated the injection well and I will see the patient back in 1 week     Yadiel Gasca was seen today for injections.     Diagnoses and all orders for this visit:    Primary osteoarthritis of left knee  -     CO ARTHROCENTESIS ASPIR&/INJ MAJOR JT/BURSA W/O US    Other orders  -     hyaluronan (ORTHOVISC) 30 MG/2ML injection 30 mg

## 2020-09-17 ENCOUNTER — OFFICE VISIT (OUTPATIENT)
Dept: ORTHOPEDIC SURGERY | Age: 50
End: 2020-09-17
Payer: COMMERCIAL

## 2020-09-17 PROCEDURE — 20610 DRAIN/INJ JOINT/BURSA W/O US: CPT | Performed by: ORTHOPAEDIC SURGERY

## 2020-09-17 NOTE — PROGRESS NOTES
Chief Complaint   Patient presents with    Injections     left knee orthovisc #4       Verbal and written consent was obtained by the patient. The following is a well known patient to me that is here for Orthovisc #4, Her knee was prepped in sterile fashion. Orthovisc 15 mg/ml injected into the Left . The patient tolerated the injection well and I will see the patient back as needed    Jessica Olivas was seen today for injections.     Diagnoses and all orders for this visit:    Primary osteoarthritis of left knee  -     SC ARTHROCENTESIS ASPIR&/INJ MAJOR JT/BURSA W/O US    Other orders  -     hyaluronan (ORTHOVISC) 30 MG/2ML injection 30 mg

## 2020-11-24 ENCOUNTER — OFFICE VISIT (OUTPATIENT)
Dept: FAMILY MEDICINE CLINIC | Age: 50
End: 2020-11-24
Payer: COMMERCIAL

## 2020-11-24 VITALS
HEART RATE: 95 BPM | SYSTOLIC BLOOD PRESSURE: 122 MMHG | DIASTOLIC BLOOD PRESSURE: 78 MMHG | BODY MASS INDEX: 35.51 KG/M2 | HEIGHT: 62 IN | WEIGHT: 193 LBS | TEMPERATURE: 97.6 F | RESPIRATION RATE: 16 BRPM | OXYGEN SATURATION: 98 %

## 2020-11-24 PROCEDURE — 99213 OFFICE O/P EST LOW 20 MIN: CPT | Performed by: FAMILY MEDICINE

## 2020-11-24 RX ORDER — OMEPRAZOLE 40 MG/1
40 CAPSULE, DELAYED RELEASE ORAL DAILY
Qty: 90 CAPSULE | Refills: 3 | Status: ON HOLD
Start: 2020-11-24 | End: 2021-12-16 | Stop reason: HOSPADM

## 2020-11-24 ASSESSMENT — ENCOUNTER SYMPTOMS
WHEEZING: 0
BLOOD IN STOOL: 0
DIARRHEA: 0
CONSTIPATION: 0

## 2020-11-24 NOTE — PROGRESS NOTES
HPI:  Patient comes in today for   Chief Complaint   Patient presents with    Mouth Lesions     Patient presents with an ulcer on tongue.  Heartburn           Review of Systems  Review of Systems   Constitutional: Negative for chills and diaphoresis. HENT: Negative for ear discharge, ear pain, hearing loss, nosebleeds and tinnitus. Respiratory: Negative for wheezing. Cardiovascular: Negative for chest pain. Gastrointestinal: Negative for blood in stool, constipation and diarrhea. Genitourinary: Negative for dysuria, flank pain and hematuria. Skin: Negative for rash. Neurological: Negative for headaches. Hematological: Does not bruise/bleed easily. PE[de-identified]  /78   Pulse 95   Temp 97.6 °F (36.4 °C) (Temporal)   Resp 16   Ht 5' 2\" (1.575 m)   Wt 193 lb (87.5 kg)   SpO2 98%   BMI 35.30 kg/m²       Physical Exam  Constitutional:       Appearance: Normal appearance. She is well-developed. HENT:      Head: Normocephalic and atraumatic. Eyes:      General: No scleral icterus. Conjunctiva/sclera: Conjunctivae normal.      Pupils: Pupils are equal, round, and reactive to light. Neck:      Musculoskeletal: Neck supple. Thyroid: No thyromegaly. Vascular: No JVD. Trachea: No tracheal deviation. Cardiovascular:      Rate and Rhythm: Normal rate and regular rhythm. Heart sounds: Normal heart sounds. No murmur. No gallop. Pulmonary:      Effort: Pulmonary effort is normal. No respiratory distress. Breath sounds: Normal breath sounds. No wheezing. Abdominal:      Palpations: Abdomen is soft. Musculoskeletal:         General: No tenderness. Skin:     Findings: No erythema or rash. Neurological:      General: No focal deficit present. Mental Status: She is alert and oriented to person, place, and time. Deep Tendon Reflexes: Reflexes normal.      Comments:                 ASSESSMENT/PLAN:    1.  Gastroesophageal reflux disease with esophagitis without hemorrhage  - omeprazole (PRILOSEC) 40 MG delayed release capsule; Take 1 capsule by mouth daily  Dispense: 90 capsule; Refill: 60 [facility] STEPHENIE Shah

## 2020-12-17 RX ORDER — VALACYCLOVIR HYDROCHLORIDE 1 G/1
TABLET, FILM COATED ORAL
Qty: 4 TABLET | Refills: 3 | Status: SHIPPED
Start: 2020-12-17 | End: 2021-10-25

## 2021-03-04 ENCOUNTER — TELEPHONE (OUTPATIENT)
Dept: FAMILY MEDICINE CLINIC | Age: 51
End: 2021-03-04

## 2021-03-04 DIAGNOSIS — G89.29 CHRONIC PAIN OF LEFT KNEE: Primary | ICD-10-CM

## 2021-03-04 DIAGNOSIS — M25.562 CHRONIC PAIN OF LEFT KNEE: Primary | ICD-10-CM

## 2021-03-04 RX ORDER — TRAMADOL HYDROCHLORIDE 50 MG/1
50 TABLET ORAL EVERY 8 HOURS PRN
Qty: 42 TABLET | Refills: 0 | Status: SHIPPED | OUTPATIENT
Start: 2021-03-04 | End: 2021-03-18

## 2021-03-04 NOTE — TELEPHONE ENCOUNTER
Controlled substances monitoring: OARRS report reviewed today- activity consistent with treatment plan.

## 2021-03-04 NOTE — TELEPHONE ENCOUNTER
Patient would like the referral to be placed for Dr. Caitlin Gaviria for left knee pain and is requesting something for pain be sent to [facility].

## 2021-03-15 DIAGNOSIS — M17.12 PRIMARY OSTEOARTHRITIS OF LEFT KNEE: Primary | ICD-10-CM

## 2021-03-16 ENCOUNTER — OFFICE VISIT (OUTPATIENT)
Dept: ORTHOPEDIC SURGERY | Age: 51
End: 2021-03-16
Payer: COMMERCIAL

## 2021-03-16 VITALS — HEIGHT: 62 IN | WEIGHT: 190 LBS | BODY MASS INDEX: 34.96 KG/M2

## 2021-03-16 DIAGNOSIS — M17.12 PRIMARY OSTEOARTHRITIS OF LEFT KNEE: Primary | ICD-10-CM

## 2021-03-16 PROCEDURE — 99213 OFFICE O/P EST LOW 20 MIN: CPT | Performed by: ORTHOPAEDIC SURGERY

## 2021-03-16 PROCEDURE — 20610 DRAIN/INJ JOINT/BURSA W/O US: CPT | Performed by: ORTHOPAEDIC SURGERY

## 2021-03-16 RX ORDER — TRIAMCINOLONE ACETONIDE 40 MG/ML
40 INJECTION, SUSPENSION INTRA-ARTICULAR; INTRAMUSCULAR ONCE
Status: COMPLETED | OUTPATIENT
Start: 2021-03-16 | End: 2021-03-16

## 2021-03-16 RX ADMIN — TRIAMCINOLONE ACETONIDE 40 MG: 40 INJECTION, SUSPENSION INTRA-ARTICULAR; INTRAMUSCULAR at 08:52

## 2021-03-16 NOTE — PROGRESS NOTES
Chief Complaint   Patient presents with    Knee Pain     chronic left knee pain. Jess Gordon returns today for follow-up of her left knee pain. she reports this is worse than when I saw her last.  The patient's pain level is a 7/10. The previous treatment of orthovisc was successful. Past Medical History:   Diagnosis Date    Chronic back pain     Knee pain, bilateral     left knee pain     Past Surgical History:   Procedure Laterality Date    APPENDECTOMY  23years old     SECTION  age 19       Current Outpatient Medications:     traMADol (ULTRAM) 50 MG tablet, Take 1 tablet by mouth every 8 hours as needed for Pain for up to 14 days. , Disp: 42 tablet, Rfl: 0    valACYclovir (VALTREX) 1 g tablet, take 1 tablet by mouth once daily, Disp: 4 tablet, Rfl: 3    omeprazole (PRILOSEC) 40 MG delayed release capsule, Take 1 capsule by mouth daily, Disp: 90 capsule, Rfl: 3  No Known Allergies  Social History     Socioeconomic History    Marital status:      Spouse name: Not on file    Number of children: Not on file    Years of education: Not on file    Highest education level: Not on file   Occupational History    Not on file   Social Needs    Financial resource strain: Not on file    Food insecurity     Worry: Not on file     Inability: Not on file    Transportation needs     Medical: Not on file     Non-medical: Not on file   Tobacco Use    Smoking status: Current Every Day Smoker     Packs/day: 1.00     Years: 20.00     Pack years: 20.00     Types: Cigarettes    Smokeless tobacco: Never Used   Substance and Sexual Activity    Alcohol use:  Yes     Alcohol/week: 0.0 standard drinks     Comment: social    Drug use: No    Sexual activity: Not on file   Lifestyle    Physical activity     Days per week: Not on file     Minutes per session: Not on file    Stress: Not on file   Relationships    Social connections     Talks on phone: Not on file     Gets together: Not on file pulses DP/PT: R. 2+; L. 2+. There is cap refill noted less than two seconds in all digits. There is not edema of the bilateral lower extremities. There is not varicosities noted in the distal extremities. Lymph:  Upon palpation,  there is no lymphadenopathy noted in bilateral lower extremities. Musculoskeletal:  Gait: antalgic; examination of the nails and digits reveal no cyanosis or clubbing    Lumbar exam:  On visual inspection, there is no deformity of the spine. full range of motion, no tenderness, palpable spasm or pain on motion. Special tests: Straight Leg Raise negative, Stephany testnegative. Hip exam:  Upon inspection, there is no deformity noted. Upon palpation there is not tenderness. ROM: is   full and semetrical.   Strength: Hip Flexors 5/5; Hip Abductors 5/5; Hip Adduction 5/5. Knee exam:  Left knee exam shows;  range of motion of R. Knee is 0 to 120, and L. Knee is 0 to 120. She does have  pain on motion, effusion is mild, there is tenderness over the  medial region, there are not any masses, there is not ligamentous instability, there is  deformity noted. Knee exam: left positive for moderate crepitations, some mild tenderness laxity is not noted with  stress. R. Knee:  Lachman's negative, Anterior Drawer negative, Posterior Drawer negative  Sonya's negative, Thallasy  negative,   PF grind test negative, Apprehension test negative, Patellar J sign  negative  L. Knee:  Lachman's negative, Anterior Drawer negative, Posterior Drawer negative  Sonya's positive, Thallasy  positive,   PF grind test positive, Apprehension test negative,  Patellar J sign  negative    Xrays: Moderate medial joint narrowing    MRI:   n/a  Radiographic findings reviewed with patient    Impression:  Encounter Diagnosis   Name Primary?  Primary osteoarthritis of left knee Yes       Plan:   Natural history and expected course discussed. Questions answered. Educational materials distributed. Rest, ice, compression, and elevation (RICE) therapy. Reduction in offending activity. I had a lengthy discussion with the patient regarding their diagnosis. I explained treatment options including surgical vs non surgical treatment. I reviewed in detail the risks and benefits and outlined the procedure in detail with expected outcomes and possible complications. I also discussed non surgical treatment such as injections (CSI and visco supplementation), physical therapy, topical creams and NSAID's. They have elected for conservative management at this time. I will proceed with a cortisone injection in the Left knee. Verbal and written consent was obtained for the injections. The skin was prepped with alcohol. 1mL of Kenalog 40mg and 9mL of 0.25% Marcaine was  injected to Left knee. The injection was given through the lateral side of the knee. The patient tolerated the injection well. I will see the patient back after visco approval   The patient has failed conservative measures such as NSAIDS, HEP, and cortisone injections. She is an excellent candidate for viscous supplementation injections including orthovisc in the Left knee.    We will contact the patient's insurance company and see them back in the office once we have received approval.

## 2021-04-08 ENCOUNTER — OFFICE VISIT (OUTPATIENT)
Dept: ORTHOPEDIC SURGERY | Age: 51
End: 2021-04-08
Payer: COMMERCIAL

## 2021-04-08 DIAGNOSIS — M17.12 PRIMARY OSTEOARTHRITIS OF LEFT KNEE: Primary | ICD-10-CM

## 2021-04-08 PROCEDURE — 20610 DRAIN/INJ JOINT/BURSA W/O US: CPT | Performed by: ORTHOPAEDIC SURGERY

## 2021-04-15 ENCOUNTER — OFFICE VISIT (OUTPATIENT)
Dept: ORTHOPEDIC SURGERY | Age: 51
End: 2021-04-15
Payer: COMMERCIAL

## 2021-04-15 DIAGNOSIS — M17.12 PRIMARY OSTEOARTHRITIS OF LEFT KNEE: Primary | ICD-10-CM

## 2021-04-15 PROCEDURE — 20610 DRAIN/INJ JOINT/BURSA W/O US: CPT | Performed by: ORTHOPAEDIC SURGERY

## 2021-04-16 NOTE — PROGRESS NOTES
Chief Complaint   Patient presents with    Injections     Left knee Orthovisc #2       Verbal and written consent was obtained by the patient. The following is a well known patient to me that is here for Orthovisc #4, Her knee was prepped in sterile fashion. Orthovisc 15 mg/ml injected into the Left . The patient tolerated the injection well and I will see the patient back as needed    Lucero Parmar was seen today for injections.     Diagnoses and all orders for this visit:    Primary osteoarthritis of left knee

## 2021-04-22 ENCOUNTER — OFFICE VISIT (OUTPATIENT)
Dept: ORTHOPEDIC SURGERY | Age: 51
End: 2021-04-22

## 2021-04-22 DIAGNOSIS — M17.12 PRIMARY OSTEOARTHRITIS OF LEFT KNEE: Primary | ICD-10-CM

## 2021-04-22 PROCEDURE — 20610 DRAIN/INJ JOINT/BURSA W/O US: CPT | Performed by: ORTHOPAEDIC SURGERY

## 2021-04-23 NOTE — PROGRESS NOTES
Chief Complaint   Patient presents with    Injections     Left knee Orthovisc #3       Verbal and written consent was obtained by the patient. The following is a well known patient to me that is here for Orthovisc #4, Her knee was prepped in sterile fashion. Orthovisc 15 mg/ml injected into the Left . The patient tolerated the injection well and I will see the patient back as needed    [facility] was seen today for injections.     Diagnoses and all orders for this visit:    Primary osteoarthritis of left knee

## 2021-04-29 ENCOUNTER — OFFICE VISIT (OUTPATIENT)
Dept: ORTHOPEDIC SURGERY | Age: 51
End: 2021-04-29

## 2021-04-29 DIAGNOSIS — M17.12 PRIMARY OSTEOARTHRITIS OF LEFT KNEE: Primary | ICD-10-CM

## 2021-04-29 PROCEDURE — 20610 DRAIN/INJ JOINT/BURSA W/O US: CPT | Performed by: ORTHOPAEDIC SURGERY

## 2021-04-30 NOTE — PROGRESS NOTES
Chief Complaint   Patient presents with    Injections     Left knee Orthovisc #4       Verbal and written consent was obtained by the patient. The following is a well known patient to me that is here for Orthovisc #4, Her knee was prepped in sterile fashion. Orthovisc 15 mg/ml injected into the Left . The patient tolerated the injection well and I will see the patient back as needed    Bess So was seen today for injections.     Diagnoses and all orders for this visit:    Primary osteoarthritis of left knee

## 2021-06-09 ENCOUNTER — OFFICE VISIT (OUTPATIENT)
Dept: FAMILY MEDICINE CLINIC | Age: 51
End: 2021-06-09
Payer: COMMERCIAL

## 2021-06-09 VITALS
HEIGHT: 62 IN | RESPIRATION RATE: 17 BRPM | SYSTOLIC BLOOD PRESSURE: 124 MMHG | DIASTOLIC BLOOD PRESSURE: 80 MMHG | HEART RATE: 89 BPM | OXYGEN SATURATION: 98 % | BODY MASS INDEX: 35.88 KG/M2 | WEIGHT: 195 LBS | TEMPERATURE: 96.9 F

## 2021-06-09 DIAGNOSIS — R63.5 UNEXPLAINED WEIGHT GAIN: Primary | ICD-10-CM

## 2021-06-09 DIAGNOSIS — E55.9 VITAMIN D DEFICIENCY: ICD-10-CM

## 2021-06-09 DIAGNOSIS — R53.83 FATIGUE, UNSPECIFIED TYPE: ICD-10-CM

## 2021-06-09 DIAGNOSIS — Z13.220 LIPID SCREENING: ICD-10-CM

## 2021-06-09 DIAGNOSIS — R22.41 NODULE OF SKIN OF RIGHT FOOT: ICD-10-CM

## 2021-06-09 PROCEDURE — 99213 OFFICE O/P EST LOW 20 MIN: CPT | Performed by: NURSE PRACTITIONER

## 2021-06-09 SDOH — ECONOMIC STABILITY: FOOD INSECURITY: WITHIN THE PAST 12 MONTHS, THE FOOD YOU BOUGHT JUST DIDN'T LAST AND YOU DIDN'T HAVE MONEY TO GET MORE.: NEVER TRUE

## 2021-06-09 SDOH — ECONOMIC STABILITY: FOOD INSECURITY: WITHIN THE PAST 12 MONTHS, YOU WORRIED THAT YOUR FOOD WOULD RUN OUT BEFORE YOU GOT MONEY TO BUY MORE.: NEVER TRUE

## 2021-06-09 ASSESSMENT — ENCOUNTER SYMPTOMS
SHORTNESS OF BREATH: 0
NAUSEA: 0
CONSTIPATION: 0
COUGH: 0
VOMITING: 0
DIARRHEA: 0
WHEEZING: 0

## 2021-06-09 ASSESSMENT — PATIENT HEALTH QUESTIONNAIRE - PHQ9
SUM OF ALL RESPONSES TO PHQ QUESTIONS 1-9: 0
2. FEELING DOWN, DEPRESSED OR HOPELESS: 0
SUM OF ALL RESPONSES TO PHQ QUESTIONS 1-9: 0
SUM OF ALL RESPONSES TO PHQ QUESTIONS 1-9: 0
1. LITTLE INTEREST OR PLEASURE IN DOING THINGS: 0
SUM OF ALL RESPONSES TO PHQ9 QUESTIONS 1 & 2: 0

## 2021-06-09 ASSESSMENT — SOCIAL DETERMINANTS OF HEALTH (SDOH): HOW HARD IS IT FOR YOU TO PAY FOR THE VERY BASICS LIKE FOOD, HOUSING, MEDICAL CARE, AND HEATING?: NOT HARD AT ALL

## 2021-06-09 NOTE — PROGRESS NOTES
Darcy Springer (:  1970) is a 44 y.o. female,Established patient, here for evaluation of the following chief complaint(s): Other (pt wants to discuss weight gain; pt requesting labs-PT IS NOT FASTING ), Pain (pt noticed lump in RT foot; pt states cramping shooting  upwards from RT foot into the thigh  ), and Referral - General (pt requesting referral for veins)         ASSESSMENT/PLAN:  1. Unexplained weight gain  -     INSULIN, TOTAL; Future  -     Comprehensive Metabolic Panel; Future  -     TSH without Reflex; Future  -     T4, Free; Future  2. Lipid screening  -     Lipid Panel; Future  3. Vitamin D deficiency  -     Vitamin D 25 Hydroxy; Future  4. Fatigue, unspecified type  -     CBC Auto Differential; Future  -     Comprehensive Metabolic Panel; Future  5. Nodule of skin of right foot  -along tendon, will self refer to Dr. Giulia Simmons    Return if symptoms worsen or fail to improve. Subjective   SUBJECTIVE/OBJECTIVE:  Complains of gradual weight gain. States she has not changed her exercise level and has tried dieting without any loss. Complains of pain to right foot for past week. She has noticed a lumps on the bottom of her foot. She does have pain up right calf at times. Complains of chronic knee pain, seeing   Complains of spider veins to bilateral lower legs      Review of Systems   Constitutional: Positive for unexpected weight change. Negative for activity change and appetite change. Respiratory: Negative for cough, shortness of breath and wheezing. Cardiovascular: Negative for chest pain and palpitations. Gastrointestinal: Negative for constipation, diarrhea, nausea and vomiting. Musculoskeletal: Positive for arthralgias and myalgias. Neurological: Negative for weakness, light-headedness and headaches.        /80 (Site: Left Upper Arm, Position: Sitting)   Pulse 89   Temp 96.9 °F (36.1 °C) (Temporal)   Resp 17   Ht 5' 2\" (1.575 m)   Wt 195 lb (88.5 kg)   SpO2 98%   BMI 35.67 kg/m²       Objective   Physical Exam  Constitutional:       General: She is not in acute distress. Appearance: She is well-developed. She is obese. HENT:      Head: Normocephalic and atraumatic. Neck:      Thyroid: No thyromegaly. Trachea: No tracheal deviation. Cardiovascular:      Rate and Rhythm: Normal rate and regular rhythm. Heart sounds: No murmur heard. Comments: Spider veins bilateral lower legs  Pulmonary:      Effort: Pulmonary effort is normal.      Breath sounds: Normal breath sounds. No wheezing or rales. Chest:      Chest wall: No tenderness. Abdominal:      General: Bowel sounds are normal.      Palpations: Abdomen is soft. Tenderness: There is no abdominal tenderness. Musculoskeletal:         General: Tenderness (right foot, plantar surface) present. Comments: Neg Umer's. Painful nodules to plantar surface of right foot   Lymphadenopathy:      Cervical: No cervical adenopathy. Skin:     General: Skin is warm and dry. Neurological:      Mental Status: She is alert and oriented to person, place, and time. Psychiatric:         Mood and Affect: Mood normal.         Behavior: Behavior normal.            [facility] low      An electronic signature was used to authenticate this note.     --Philomena Force, APRN - CNP

## 2021-06-15 DIAGNOSIS — Z13.220 LIPID SCREENING: ICD-10-CM

## 2021-06-15 DIAGNOSIS — E55.9 VITAMIN D DEFICIENCY: ICD-10-CM

## 2021-06-15 DIAGNOSIS — R53.83 FATIGUE, UNSPECIFIED TYPE: ICD-10-CM

## 2021-06-15 DIAGNOSIS — R63.5 UNEXPLAINED WEIGHT GAIN: ICD-10-CM

## 2021-06-15 LAB
ALBUMIN SERPL-MCNC: 4.3 G/DL (ref 3.5–5.2)
ALP BLD-CCNC: 91 U/L (ref 35–104)
ALT SERPL-CCNC: 36 U/L (ref 0–32)
ANION GAP SERPL CALCULATED.3IONS-SCNC: 13 MMOL/L (ref 7–16)
AST SERPL-CCNC: 34 U/L (ref 0–31)
BASOPHILS ABSOLUTE: 0.07 E9/L (ref 0–0.2)
BASOPHILS RELATIVE PERCENT: 0.7 % (ref 0–2)
BILIRUB SERPL-MCNC: 0.3 MG/DL (ref 0–1.2)
BUN BLDV-MCNC: 8 MG/DL (ref 6–20)
CALCIUM SERPL-MCNC: 9.3 MG/DL (ref 8.6–10.2)
CHLORIDE BLD-SCNC: 97 MMOL/L (ref 98–107)
CHOLESTEROL, TOTAL: 171 MG/DL (ref 0–199)
CO2: 22 MMOL/L (ref 22–29)
CREAT SERPL-MCNC: 0.8 MG/DL (ref 0.5–1)
EOSINOPHILS ABSOLUTE: 0.13 E9/L (ref 0.05–0.5)
EOSINOPHILS RELATIVE PERCENT: 1.2 % (ref 0–6)
GFR AFRICAN AMERICAN: >60
GFR NON-AFRICAN AMERICAN: >60 ML/MIN/1.73
GLUCOSE BLD-MCNC: 92 MG/DL (ref 74–99)
HCT VFR BLD CALC: 41.2 % (ref 34–48)
HDLC SERPL-MCNC: 61 MG/DL
HEMOGLOBIN: 13.3 G/DL (ref 11.5–15.5)
IMMATURE GRANULOCYTES #: 0.04 E9/L
IMMATURE GRANULOCYTES %: 0.4 % (ref 0–5)
LDL CHOLESTEROL CALCULATED: 96 MG/DL (ref 0–99)
LYMPHOCYTES ABSOLUTE: 3.44 E9/L (ref 1.5–4)
LYMPHOCYTES RELATIVE PERCENT: 32.6 % (ref 20–42)
MCH RBC QN AUTO: 32.3 PG (ref 26–35)
MCHC RBC AUTO-ENTMCNC: 32.3 % (ref 32–34.5)
MCV RBC AUTO: 100 FL (ref 80–99.9)
MONOCYTES ABSOLUTE: 0.64 E9/L (ref 0.1–0.95)
MONOCYTES RELATIVE PERCENT: 6.1 % (ref 2–12)
NEUTROPHILS ABSOLUTE: 6.22 E9/L (ref 1.8–7.3)
NEUTROPHILS RELATIVE PERCENT: 59 % (ref 43–80)
PDW BLD-RTO: 12.3 FL (ref 11.5–15)
PLATELET # BLD: 304 E9/L (ref 130–450)
PMV BLD AUTO: 9.9 FL (ref 7–12)
POTASSIUM SERPL-SCNC: 4.5 MMOL/L (ref 3.5–5)
RBC # BLD: 4.12 E12/L (ref 3.5–5.5)
SODIUM BLD-SCNC: 132 MMOL/L (ref 132–146)
T4 FREE: 1.11 NG/DL (ref 0.93–1.7)
TOTAL PROTEIN: 7.8 G/DL (ref 6.4–8.3)
TRIGL SERPL-MCNC: 71 MG/DL (ref 0–149)
TSH SERPL DL<=0.05 MIU/L-ACNC: 1.32 UIU/ML (ref 0.27–4.2)
VITAMIN D 25-HYDROXY: 38 NG/ML (ref 30–100)
VLDLC SERPL CALC-MCNC: 14 MG/DL
WBC # BLD: 10.5 E9/L (ref 4.5–11.5)

## 2021-06-16 ENCOUNTER — TELEPHONE (OUTPATIENT)
Dept: FAMILY MEDICINE CLINIC | Age: 51
End: 2021-06-16

## 2021-06-18 ENCOUNTER — TELEPHONE (OUTPATIENT)
Dept: FAMILY MEDICINE CLINIC | Age: 51
End: 2021-06-18

## 2021-06-18 LAB — INSULIN: 10 UIU/ML

## 2021-06-18 NOTE — TELEPHONE ENCOUNTER
LVM advising pt to c/b office for results        RAPHAEL Cramer - CNP  P yx 40 [facility] Staff  Insulin level within normal limits

## 2021-06-21 ENCOUNTER — TELEPHONE (OUTPATIENT)
Dept: FAMILY MEDICINE CLINIC | Age: 51
End: 2021-06-21

## 2021-06-21 NOTE — TELEPHONE ENCOUNTER
LVM for patient to call back regarding insulin results.        Ulysses Kempf, APRN - CNP  P Mhyx [facility] Clinical Staff  Insulin level within normal limits

## 2021-06-25 ENCOUNTER — TELEPHONE (OUTPATIENT)
Dept: FAMILY MEDICINE CLINIC | Age: 51
End: 2021-06-25

## 2021-10-25 DIAGNOSIS — B00.1 RECURRENT COLD SORES: ICD-10-CM

## 2021-10-25 RX ORDER — VALACYCLOVIR HYDROCHLORIDE 1 G/1
TABLET, FILM COATED ORAL
Qty: 4 TABLET | Refills: 2 | Status: SHIPPED
Start: 2021-10-25 | End: 2022-01-11 | Stop reason: SDUPTHER

## 2021-11-11 ENCOUNTER — OFFICE VISIT (OUTPATIENT)
Dept: FAMILY MEDICINE CLINIC | Age: 51
End: 2021-11-11
Payer: COMMERCIAL

## 2021-11-11 VITALS
WEIGHT: 196.6 LBS | BODY MASS INDEX: 36.18 KG/M2 | DIASTOLIC BLOOD PRESSURE: 72 MMHG | TEMPERATURE: 96.9 F | HEART RATE: 84 BPM | SYSTOLIC BLOOD PRESSURE: 134 MMHG | HEIGHT: 62 IN | RESPIRATION RATE: 16 BRPM | OXYGEN SATURATION: 96 %

## 2021-11-11 DIAGNOSIS — M67.912 DISORDER OF ROTATOR CUFF, LEFT: Primary | ICD-10-CM

## 2021-11-11 PROCEDURE — 99213 OFFICE O/P EST LOW 20 MIN: CPT | Performed by: FAMILY MEDICINE

## 2021-11-11 RX ORDER — PREDNISONE 5 MG/1
TABLET ORAL
Qty: 21 EACH | Refills: 0 | Status: SHIPPED
Start: 2021-11-11 | End: 2022-02-22

## 2021-11-11 RX ORDER — OXYCODONE HYDROCHLORIDE AND ACETAMINOPHEN 5; 325 MG/1; MG/1
1 TABLET ORAL EVERY 6 HOURS PRN
Qty: 28 TABLET | Refills: 0 | Status: SHIPPED | OUTPATIENT
Start: 2021-11-11 | End: 2021-11-18

## 2021-11-11 ASSESSMENT — ENCOUNTER SYMPTOMS
VOMITING: 0
WHEEZING: 0
ABDOMINAL PAIN: 0
CONSTIPATION: 0
DIARRHEA: 0
BLOOD IN STOOL: 0
COUGH: 0
SHORTNESS OF BREATH: 0
NAUSEA: 0

## 2021-11-11 NOTE — PROGRESS NOTES
distress. Appearance: She is well-developed. HENT:      Head: Normocephalic. Eyes:      General: No scleral icterus. Neck:      Thyroid: No thyromegaly. Vascular: No JVD. Trachea: No tracheal deviation. Cardiovascular:      Heart sounds: No gallop. Pulmonary:      Effort: Pulmonary effort is normal. No respiratory distress. Breath sounds: No wheezing. Musculoskeletal:         General: No tenderness. Comments: Right hand dominant. Left shoulder pain on corocoid and pain on external rotation. Skin:     Capillary Refill: Capillary refill takes less than 2 seconds. Findings: No erythema. Neurological:      Deep Tendon Reflexes: Reflexes normal.            On this date 11/11/2021 I have spent 23 minutes reviewing previous notes, test results and face to face with the patient discussing the diagnosis and importance of compliance with the treatment plan as well as documenting on the day of the visit. An electronic signature was used to authenticate this note.     --Allen Shelton, DO

## 2021-12-10 ENCOUNTER — HOSPITAL ENCOUNTER (OUTPATIENT)
Age: 51
Discharge: HOME | End: 2021-12-12
Payer: COMMERCIAL

## 2021-12-10 ENCOUNTER — HOSPITAL ENCOUNTER (OUTPATIENT)
Dept: PREADMISSION TESTING | Age: 51
Discharge: HOME | End: 2021-12-10

## 2021-12-10 VITALS
DIASTOLIC BLOOD PRESSURE: 79 MMHG | BODY MASS INDEX: 36.07 KG/M2 | HEART RATE: 84 BPM | OXYGEN SATURATION: 95 % | WEIGHT: 196 LBS | SYSTOLIC BLOOD PRESSURE: 138 MMHG | TEMPERATURE: 97.8 F | HEIGHT: 62 IN | RESPIRATION RATE: 16 BRPM

## 2021-12-10 DIAGNOSIS — Z01.818 PREOP TESTING: ICD-10-CM

## 2021-12-10 PROCEDURE — U0003 INFECTIOUS AGENT DETECTION BY NUCLEIC ACID (DNA OR RNA); SEVERE ACUTE RESPIRATORY SYNDROME CORONAVIRUS 2 (SARS-COV-2) (CORONAVIRUS DISEASE [COVID-19]), AMPLIFIED PROBE TECHNIQUE, MAKING USE OF HIGH THROUGHPUT TECHNOLOGIES AS DESCRIBED BY CMS-2020-01-R: HCPCS

## 2021-12-10 PROCEDURE — U0005 INFEC AGEN DETEC AMPLI PROBE: HCPCS

## 2021-12-10 RX ORDER — OXYCODONE HYDROCHLORIDE AND ACETAMINOPHEN 5; 325 MG/1; MG/1
1 TABLET ORAL EVERY 6 HOURS PRN
COMMUNITY
End: 2022-02-22 | Stop reason: SDUPTHER

## 2021-12-10 ASSESSMENT — PAIN SCALES - GENERAL: PAINLEVEL_OUTOF10: 9

## 2021-12-10 ASSESSMENT — PAIN - FUNCTIONAL ASSESSMENT: PAIN_FUNCTIONAL_ASSESSMENT: PREVENTS OR INTERFERES WITH MANY ACTIVE NOT PASSIVE ACTIVITIES

## 2021-12-10 ASSESSMENT — PAIN DESCRIPTION - ORIENTATION: ORIENTATION: LEFT

## 2021-12-10 ASSESSMENT — PAIN DESCRIPTION - PAIN TYPE: TYPE: CHRONIC PAIN

## 2021-12-10 ASSESSMENT — PAIN DESCRIPTION - LOCATION: LOCATION: KNEE

## 2021-12-10 ASSESSMENT — PAIN DESCRIPTION - ONSET: ONSET: ON-GOING

## 2021-12-10 ASSESSMENT — PAIN DESCRIPTION - FREQUENCY: FREQUENCY: CONTINUOUS

## 2021-12-10 ASSESSMENT — PAIN DESCRIPTION - DESCRIPTORS: DESCRIPTORS: ACHING;STABBING;THROBBING

## 2021-12-10 ASSESSMENT — PAIN DESCRIPTION - PROGRESSION: CLINICAL_PROGRESSION: GRADUALLY WORSENING

## 2021-12-10 NOTE — PROGRESS NOTES
[address] [facility]                                                                                                                    PRE OP INSTRUCTIONS FOR  Moreno Mcmahon        Date: 12/10/2021    Date of surgery: 12/16/21   Arrival Time: Hospital will call you between 5pm and 7pm with your final arrival time for surgery    1. Do not eat or drink anything after midnight prior to surgery. This includes no water, chewing gum, mints or ice chips. 2. Take the following medications with a small sip of water on the morning of Surgery: none       3. Aspirin, Ibuprofen, Advil, Naproxen, Vitamin E and other Anti-inflammatory products should be stopped  before surgery  as directed by your physician. Take Tylenol only unless instructed otherwise by your surgeon. 4. Do not smoke,use illicit drugs and do not drink any alcoholic beverages 24 hours prior to surgery. 5. You may brush your teeth the morning of surgery. DO NOT SWALLOW WATER    6. You MUST make arrangements for a responsible adult to take you home after your surgery. You will not be allowed to leave alone or drive yourself home. It is strongly suggested someone stay with you the first 24 hrs. Your surgery will be cancelled if you do not have a ride home. 7. Please wear simple, loose fitting clothing to the hospital.  Cecilia Coma not bring valuables (money, credit cards, checkbooks, etc.) Do not wear any makeup (including no eye makeup) or nail polish on your fingers or toes. 8. DO NOT wear any jewelry or piercings on day of surgery. All body piercing jewelry must be removed. 9. Shower the night before surgery with _x__Antibacterial soap /KARI WIPES________          10.  If appropriate bring crutches, inspirex, WALKER, CANE etc...    11. Notify your Surgeon if you develop any illness between now and surgery time, cough, cold, fever, sore throat, nausea, vomiting, etc.  Please notify your surgeon if you experience dizziness, shortness of breath or blurred vision between now & the time of your surgery. 12. If you have ___dentures, they will be removed before going to the OR; we will provide you a container. If you wear ___contact lenses or _x__glasses, they will be removed; please bring a case for them. 13. To provide excellent care visitors will be limited to 1  in the room at any given time. 14. During flu season no children under the age of 15 are permitted in the hospital for the safety of all patients. 15. Other come in main lobby and stop at . Please call AMBULATORY CARE if you have any further questions.    [address] [facility]     [address]

## 2021-12-11 LAB — SARS-COV-2, PCR: NOT DETECTED

## 2021-12-15 ENCOUNTER — ANESTHESIA EVENT (OUTPATIENT)
Dept: OPERATING ROOM | Age: 51
End: 2021-12-15
Payer: COMMERCIAL

## 2021-12-16 ENCOUNTER — HOSPITAL ENCOUNTER (OUTPATIENT)
Age: 51
Setting detail: OUTPATIENT SURGERY
Discharge: HOME | End: 2021-12-16
Attending: ORTHOPAEDIC SURGERY | Admitting: ORTHOPAEDIC SURGERY
Payer: COMMERCIAL

## 2021-12-16 ENCOUNTER — ANESTHESIA (OUTPATIENT)
Dept: OPERATING ROOM | Age: 51
End: 2021-12-16
Payer: COMMERCIAL

## 2021-12-16 VITALS
SYSTOLIC BLOOD PRESSURE: 107 MMHG | DIASTOLIC BLOOD PRESSURE: 74 MMHG | OXYGEN SATURATION: 99 % | TEMPERATURE: 96.8 F | RESPIRATION RATE: 15 BRPM

## 2021-12-16 VITALS
SYSTOLIC BLOOD PRESSURE: 130 MMHG | RESPIRATION RATE: 18 BRPM | TEMPERATURE: 97.6 F | DIASTOLIC BLOOD PRESSURE: 72 MMHG | HEART RATE: 74 BPM | BODY MASS INDEX: 34.78 KG/M2 | HEIGHT: 62 IN | OXYGEN SATURATION: 97 % | WEIGHT: 189 LBS

## 2021-12-16 DIAGNOSIS — S83.232A COMPLEX TEAR OF MEDIAL MENISCUS OF LEFT KNEE AS CURRENT INJURY, INITIAL ENCOUNTER: ICD-10-CM

## 2021-12-16 DIAGNOSIS — Z01.818 PREOP TESTING: Primary | ICD-10-CM

## 2021-12-16 PROCEDURE — 2500000003 HC RX 250 WO HCPCS: Performed by: ORTHOPAEDIC SURGERY

## 2021-12-16 PROCEDURE — 7100000000 HC PACU RECOVERY - FIRST 15 MIN: Performed by: ORTHOPAEDIC SURGERY

## 2021-12-16 PROCEDURE — 6360000002 HC RX W HCPCS

## 2021-12-16 PROCEDURE — 7100000011 HC PHASE II RECOVERY - ADDTL 15 MIN: Performed by: ORTHOPAEDIC SURGERY

## 2021-12-16 PROCEDURE — 2580000003 HC RX 258: Performed by: NURSE ANESTHETIST, CERTIFIED REGISTERED

## 2021-12-16 PROCEDURE — 2500000003 HC RX 250 WO HCPCS

## 2021-12-16 PROCEDURE — 7100000001 HC PACU RECOVERY - ADDTL 15 MIN: Performed by: ORTHOPAEDIC SURGERY

## 2021-12-16 PROCEDURE — 3600000003 HC SURGERY LEVEL 3 BASE: Performed by: ORTHOPAEDIC SURGERY

## 2021-12-16 PROCEDURE — 6360000002 HC RX W HCPCS: Performed by: ANESTHESIOLOGY

## 2021-12-16 PROCEDURE — 2709999900 HC NON-CHARGEABLE SUPPLY: Performed by: ORTHOPAEDIC SURGERY

## 2021-12-16 PROCEDURE — 7100000010 HC PHASE II RECOVERY - FIRST 15 MIN: Performed by: ORTHOPAEDIC SURGERY

## 2021-12-16 PROCEDURE — 6360000002 HC RX W HCPCS: Performed by: ORTHOPAEDIC SURGERY

## 2021-12-16 PROCEDURE — 3600000013 HC SURGERY LEVEL 3 ADDTL 15MIN: Performed by: ORTHOPAEDIC SURGERY

## 2021-12-16 PROCEDURE — 3700000001 HC ADD 15 MINUTES (ANESTHESIA): Performed by: ORTHOPAEDIC SURGERY

## 2021-12-16 PROCEDURE — 3700000000 HC ANESTHESIA ATTENDED CARE: Performed by: ORTHOPAEDIC SURGERY

## 2021-12-16 RX ORDER — FENTANYL CITRATE 50 UG/ML
INJECTION, SOLUTION INTRAMUSCULAR; INTRAVENOUS PRN
Status: DISCONTINUED | OUTPATIENT
Start: 2021-12-16 | End: 2021-12-16 | Stop reason: SDUPTHER

## 2021-12-16 RX ORDER — POVIDONE-IODINE 10 MG/G
OINTMENT TOPICAL PRN
Status: DISCONTINUED | OUTPATIENT
Start: 2021-12-16 | End: 2021-12-16 | Stop reason: HOSPADM

## 2021-12-16 RX ORDER — FENTANYL CITRATE 50 UG/ML
25 INJECTION, SOLUTION INTRAMUSCULAR; INTRAVENOUS EVERY 5 MIN PRN
Status: DISCONTINUED | OUTPATIENT
Start: 2021-12-16 | End: 2021-12-16 | Stop reason: HOSPADM

## 2021-12-16 RX ORDER — ONDANSETRON 2 MG/ML
INJECTION INTRAMUSCULAR; INTRAVENOUS PRN
Status: DISCONTINUED | OUTPATIENT
Start: 2021-12-16 | End: 2021-12-16 | Stop reason: SDUPTHER

## 2021-12-16 RX ORDER — SODIUM CHLORIDE 9 MG/ML
INJECTION, SOLUTION INTRAVENOUS CONTINUOUS PRN
Status: DISCONTINUED | OUTPATIENT
Start: 2021-12-16 | End: 2021-12-16 | Stop reason: SDUPTHER

## 2021-12-16 RX ORDER — MIDAZOLAM HYDROCHLORIDE 1 MG/ML
INJECTION INTRAMUSCULAR; INTRAVENOUS PRN
Status: DISCONTINUED | OUTPATIENT
Start: 2021-12-16 | End: 2021-12-16 | Stop reason: SDUPTHER

## 2021-12-16 RX ORDER — FENTANYL CITRATE 50 UG/ML
INJECTION, SOLUTION INTRAMUSCULAR; INTRAVENOUS
Status: COMPLETED
Start: 2021-12-16 | End: 2021-12-16

## 2021-12-16 RX ORDER — LABETALOL HYDROCHLORIDE 5 MG/ML
INJECTION, SOLUTION INTRAVENOUS PRN
Status: DISCONTINUED | OUTPATIENT
Start: 2021-12-16 | End: 2021-12-16 | Stop reason: SDUPTHER

## 2021-12-16 RX ORDER — DEXAMETHASONE SODIUM PHOSPHATE 4 MG/ML
INJECTION, SOLUTION INTRA-ARTICULAR; INTRALESIONAL; INTRAMUSCULAR; INTRAVENOUS; SOFT TISSUE PRN
Status: DISCONTINUED | OUTPATIENT
Start: 2021-12-16 | End: 2021-12-16 | Stop reason: SDUPTHER

## 2021-12-16 RX ORDER — FENTANYL CITRATE 50 UG/ML
50 INJECTION, SOLUTION INTRAMUSCULAR; INTRAVENOUS EVERY 5 MIN PRN
Status: DISCONTINUED | OUTPATIENT
Start: 2021-12-16 | End: 2021-12-16 | Stop reason: HOSPADM

## 2021-12-16 RX ORDER — SODIUM CHLORIDE 9 MG/ML
INJECTION, SOLUTION INTRAVENOUS CONTINUOUS
Status: DISCONTINUED | OUTPATIENT
Start: 2021-12-16 | End: 2021-12-16 | Stop reason: HOSPADM

## 2021-12-16 RX ORDER — MEPERIDINE HYDROCHLORIDE 25 MG/ML
25 INJECTION INTRAMUSCULAR; INTRAVENOUS; SUBCUTANEOUS EVERY 5 MIN PRN
Status: DISCONTINUED | OUTPATIENT
Start: 2021-12-16 | End: 2021-12-16 | Stop reason: HOSPADM

## 2021-12-16 RX ORDER — MEPERIDINE HYDROCHLORIDE 25 MG/ML
INJECTION INTRAMUSCULAR; INTRAVENOUS; SUBCUTANEOUS
Status: COMPLETED
Start: 2021-12-16 | End: 2021-12-16

## 2021-12-16 RX ORDER — SODIUM CHLORIDE 0.9 % (FLUSH) 0.9 %
5-40 SYRINGE (ML) INJECTION EVERY 12 HOURS SCHEDULED
Status: DISCONTINUED | OUTPATIENT
Start: 2021-12-16 | End: 2021-12-16 | Stop reason: HOSPADM

## 2021-12-16 RX ORDER — BUPIVACAINE HYDROCHLORIDE 2.5 MG/ML
INJECTION, SOLUTION EPIDURAL; INFILTRATION; INTRACAUDAL PRN
Status: DISCONTINUED | OUTPATIENT
Start: 2021-12-16 | End: 2021-12-16 | Stop reason: ALTCHOICE

## 2021-12-16 RX ORDER — SODIUM CHLORIDE 0.9 % (FLUSH) 0.9 %
5-40 SYRINGE (ML) INJECTION PRN
Status: DISCONTINUED | OUTPATIENT
Start: 2021-12-16 | End: 2021-12-16 | Stop reason: HOSPADM

## 2021-12-16 RX ORDER — LIDOCAINE HYDROCHLORIDE 20 MG/ML
INJECTION, SOLUTION INTRAVENOUS PRN
Status: DISCONTINUED | OUTPATIENT
Start: 2021-12-16 | End: 2021-12-16 | Stop reason: SDUPTHER

## 2021-12-16 RX ORDER — PROPOFOL 10 MG/ML
INJECTION, EMULSION INTRAVENOUS PRN
Status: DISCONTINUED | OUTPATIENT
Start: 2021-12-16 | End: 2021-12-16 | Stop reason: SDUPTHER

## 2021-12-16 RX ORDER — SODIUM CHLORIDE 9 MG/ML
25 INJECTION, SOLUTION INTRAVENOUS PRN
Status: DISCONTINUED | OUTPATIENT
Start: 2021-12-16 | End: 2021-12-16 | Stop reason: HOSPADM

## 2021-12-16 RX ORDER — HYDROCODONE BITARTRATE AND ACETAMINOPHEN 5; 325 MG/1; MG/1
1 TABLET ORAL EVERY 4 HOURS PRN
Qty: 21 TABLET | Refills: 0 | Status: SHIPPED | OUTPATIENT
Start: 2021-12-16 | End: 2021-12-23

## 2021-12-16 RX ADMIN — FENTANYL CITRATE 50 MCG: 50 INJECTION INTRAMUSCULAR; INTRAVENOUS at 14:51

## 2021-12-16 RX ADMIN — PROPOFOL 180 MG: 10 INJECTION, EMULSION INTRAVENOUS at 13:13

## 2021-12-16 RX ADMIN — MIDAZOLAM 2 MG: 1 INJECTION INTRAMUSCULAR; INTRAVENOUS at 13:07

## 2021-12-16 RX ADMIN — LIDOCAINE HYDROCHLORIDE 40 MG: 20 INJECTION, SOLUTION INTRAVENOUS at 13:13

## 2021-12-16 RX ADMIN — FENTANYL CITRATE 50 MCG: 50 INJECTION INTRAMUSCULAR; INTRAVENOUS at 14:23

## 2021-12-16 RX ADMIN — LABETALOL HYDROCHLORIDE 5 MG: 5 INJECTION INTRAVENOUS at 13:48

## 2021-12-16 RX ADMIN — Medication 2 G: at 13:19

## 2021-12-16 RX ADMIN — FENTANYL CITRATE 50 MCG: 50 INJECTION INTRAMUSCULAR; INTRAVENOUS at 14:32

## 2021-12-16 RX ADMIN — FENTANYL CITRATE 25 MCG: 50 INJECTION, SOLUTION INTRAMUSCULAR; INTRAVENOUS at 13:26

## 2021-12-16 RX ADMIN — MEPERIDINE HYDROCHLORIDE 25 MG: 25 INJECTION INTRAMUSCULAR; INTRAVENOUS; SUBCUTANEOUS at 14:10

## 2021-12-16 RX ADMIN — ONDANSETRON 4 MG: 2 INJECTION INTRAMUSCULAR; INTRAVENOUS at 13:20

## 2021-12-16 RX ADMIN — FENTANYL CITRATE 50 MCG: 50 INJECTION, SOLUTION INTRAMUSCULAR; INTRAVENOUS at 13:29

## 2021-12-16 RX ADMIN — SODIUM CHLORIDE: 9 INJECTION, SOLUTION INTRAVENOUS at 13:04

## 2021-12-16 RX ADMIN — DEXAMETHASONE SODIUM PHOSPHATE 4 MG: 4 INJECTION, SOLUTION INTRAMUSCULAR; INTRAVENOUS at 13:20

## 2021-12-16 RX ADMIN — FENTANYL CITRATE 25 MCG: 50 INJECTION, SOLUTION INTRAMUSCULAR; INTRAVENOUS at 13:32

## 2021-12-16 ASSESSMENT — PAIN SCALES - GENERAL
PAINLEVEL_OUTOF10: 9
PAINLEVEL_OUTOF10: 0
PAINLEVEL_OUTOF10: 8
PAINLEVEL_OUTOF10: 6
PAINLEVEL_OUTOF10: 8
PAINLEVEL_OUTOF10: 7
PAINLEVEL_OUTOF10: 5

## 2021-12-16 ASSESSMENT — PULMONARY FUNCTION TESTS
PIF_VALUE: 7
PIF_VALUE: 0
PIF_VALUE: 8
PIF_VALUE: 8
PIF_VALUE: 9
PIF_VALUE: 8
PIF_VALUE: 9
PIF_VALUE: 3
PIF_VALUE: 4
PIF_VALUE: 3
PIF_VALUE: 7
PIF_VALUE: 9
PIF_VALUE: 2
PIF_VALUE: 8
PIF_VALUE: 9
PIF_VALUE: 10
PIF_VALUE: 8
PIF_VALUE: 3
PIF_VALUE: 8
PIF_VALUE: 1
PIF_VALUE: 8
PIF_VALUE: 0
PIF_VALUE: 8
PIF_VALUE: 3
PIF_VALUE: 3
PIF_VALUE: 9
PIF_VALUE: 4
PIF_VALUE: 9
PIF_VALUE: 9
PIF_VALUE: 23
PIF_VALUE: 21
PIF_VALUE: 9
PIF_VALUE: 8
PIF_VALUE: 19
PIF_VALUE: 8
PIF_VALUE: 1
PIF_VALUE: 8
PIF_VALUE: 3
PIF_VALUE: 9
PIF_VALUE: 5
PIF_VALUE: 9
PIF_VALUE: 9
PIF_VALUE: 8
PIF_VALUE: 3
PIF_VALUE: 0
PIF_VALUE: 5
PIF_VALUE: 3
PIF_VALUE: 3
PIF_VALUE: 1
PIF_VALUE: 3

## 2021-12-16 ASSESSMENT — PAIN DESCRIPTION - DESCRIPTORS
DESCRIPTORS: SHARP;STABBING
DESCRIPTORS: ACHING;DISCOMFORT
DESCRIPTORS: SHARP;STABBING
DESCRIPTORS: ACHING;DISCOMFORT;CRUSHING
DESCRIPTORS: SHARP;STABBING;JABBING

## 2021-12-16 ASSESSMENT — PAIN DESCRIPTION - ORIENTATION
ORIENTATION: LEFT

## 2021-12-16 ASSESSMENT — PAIN DESCRIPTION - LOCATION
LOCATION: KNEE

## 2021-12-16 ASSESSMENT — PAIN DESCRIPTION - PAIN TYPE
TYPE: CHRONIC PAIN
TYPE: SURGICAL PAIN

## 2021-12-16 ASSESSMENT — LIFESTYLE VARIABLES: SMOKING_STATUS: 1

## 2021-12-16 ASSESSMENT — PAIN DESCRIPTION - ONSET: ONSET: AWAKENED FROM SLEEP

## 2021-12-16 NOTE — ANESTHESIA PRE PROCEDURE
Department of Anesthesiology  Preprocedure Note       Name:  Patrica Watters   Age:  46 y.o.  :  1970                                          MRN:  [id number]         Date:  2021      Surgeon: You Muse):  Brigitte Sapp DO    Procedure: Procedure(s):  ARTHROSCOPY LEFT KNEE AND  LEFT MENISECTOMY(98668)    Medications prior to admission:   Prior to Admission medications    Medication Sig Start Date End Date Taking? Authorizing Provider   oxyCODONE-acetaminophen (PERCOCET) 5-325 MG per tablet Take 1 tablet by mouth every 6 hours as needed for Pain.     Historical Provider, MD   predniSONE 5 MG (21) TBPK Take 6 pills on day 1, 5 pills on day 2,4 pills on day 3, 3 pills on day 4, 2 pills on day 5, 1 pill on day 6. 21   Tejal Chen DO   valACYclovir (VALTREX) 1 g tablet take 1 tablet by mouth once daily  Patient taking differently: Take by mouth daily as needed  10/25/21   Tejal Chen DO   omeprazole (PRILOSEC) 40 MG delayed release capsule Take 1 capsule by mouth daily 20   Tejal Chen DO       Current medications:    Current Facility-Administered Medications   Medication Dose Route Frequency Provider Last Rate Last Admin    0.9 % sodium chloride infusion   IntraVENous Continuous Brigitte Sapp,         sodium chloride flush 0.9 % injection 5-40 mL  5-40 mL IntraVENous 2 times per day Brigitte Sapp,         sodium chloride flush 0.9 % injection 5-40 mL  5-40 mL IntraVENous PRN Brigitte Sapp DO        0.9 % sodium chloride infusion  25 mL IntraVENous PRN Johanne Munoz,         ceFAZolin (ANCEF) 2000 mg in sterile water 20 mL IV syringe  2,000 mg IntraVENous On Call to [facility], DO        povidone-iodine (BETADINE) 10 % ointment   Topical PRN Brigitte Sapp DO           Allergies:  No Known Allergies    Problem List:    Patient Active Problem List   Diagnosis Code    Knee pain, bilateral M25.561, M25.562    Recurrent cold sores B00.1    Maxillary sinusitis J32.0    Midline low back pain without sciatica M54.50    Tobacco abuse counseling Z71.6    Chronic pain of left knee M25.562, G89.29    Numbness and tingling of right leg R20.0, R20.2    Impingement syndrome of left shoulder M75.42       Past Medical History:        Diagnosis Date    Chronic back pain     Knee pain, bilateral     left knee pain       Past Surgical History:        Procedure Laterality Date    APPENDECTOMY  23years old     SECTION  age 19       Social History:    Social History     Tobacco Use    Smoking status: Current Every Day Smoker     Packs/day: 1.00     Years: 30.00     Pack years: 30.00     Types: Cigarettes    Smokeless tobacco: Never Used   Substance Use Topics    Alcohol use: Yes     Alcohol/week: 4.0 standard drinks     Types: 4 Cans of beer per week     Comment: daily                                Ready to quit: Not Answered  Counseling given: Not Answered      Vital Signs (Current):   Vitals:    21 1200   BP: 135/73   Pulse: 95   Resp: 16   Temp: 97.5 °F (36.4 °C)   TempSrc: Infrared   SpO2: 97%   Weight: 189 lb (85.7 kg)   Height: 5' 2\" (1.575 m)                                              BP Readings from Last 3 Encounters:   21 135/73   12/10/21 138/79   21 134/72       NPO Status:                                                                                 BMI:   Wt Readings from Last 3 Encounters:   21 189 lb (85.7 kg)   12/10/21 196 lb (88.9 kg)   21 196 lb 9.6 oz (89.2 kg)     Body mass index is 34.57 kg/m².     CBC:   Lab Results   Component Value Date    WBC 10.5 06/15/2021    RBC 4.12 06/15/2021    HGB 13.3 06/15/2021    HCT 41.2 06/15/2021    .0 06/15/2021    RDW 12.3 06/15/2021     06/15/2021       CMP:   Lab Results   Component Value Date     06/15/2021    K 4.5 06/15/2021    CL 97 06/15/2021    CO2 22 06/15/2021    BUN 8 06/15/2021 CREATININE 0.8 06/15/2021    GFRAA >60 06/15/2021    LABGLOM >60 06/15/2021    GLUCOSE 92 06/15/2021    PROT 7.8 06/15/2021    CALCIUM 9.3 06/15/2021    BILITOT 0.3 06/15/2021    ALKPHOS 91 06/15/2021    AST 34 06/15/2021    ALT 36 06/15/2021       POC Tests: No results for input(s): POCGLU, POCNA, POCK, POCCL, POCBUN, POCHEMO, POCHCT in the last 72 hours. Coags: No results found for: PROTIME, INR, APTT    HCG (If Applicable): No results found for: PREGTESTUR, PREGSERUM, HCG, HCGQUANT     ABGs: No results found for: PHART, PO2ART, QLF7OTS, VWO9OHD, BEART, O0RCYSES     Type & Screen (If Applicable):  No results found for: LABABO, LABRH    Drug/Infectious Status (If Applicable):  No results found for: HIV, HEPCAB    COVID-19 Screening (If Applicable):   Lab Results   Component Value Date    COVID19 Not Detected 12/10/2021           Anesthesia Evaluation  Patient summary reviewed  Airway: Mallampati: III  TM distance: >3 FB   Neck ROM: full  Mouth opening: > = 3 FB Dental: normal exam         Pulmonary: breath sounds clear to auscultation  (+) current smoker          Patient smoked on day of surgery. Cardiovascular:Negative CV ROS            Rhythm: regular  Rate: normal                    Neuro/Psych:   Negative Neuro/Psych ROS              GI/Hepatic/Renal:   (+) morbid obesity          Endo/Other: Negative Endo/Other ROS                    Abdominal:   (+) obese,     Abdomen: soft. Vascular: Other Findings:             Anesthesia Plan      general     ASA 2       Induction: intravenous. Anesthetic plan and risks discussed with patient.                       Shannan Larose MD   12/16/2021

## 2021-12-16 NOTE — PROGRESS NOTES
Spoke with anesthesia about pts BP running in 160's with pain around 6/10 after several doses of fentanyl, she is not on any BP meds at home. Instructed it was okay to progress to [facility] with current vitals. Will continue to monitor.

## 2021-12-16 NOTE — ANESTHESIA POSTPROCEDURE EVALUATION
Department of Anesthesiology  Postprocedure Note    Patient: Alisha Lugo  MRN: [id number]  YOB: 1970  Date of evaluation: 12/16/2021  Time:  4:34 PM     Procedure Summary     Date: 12/16/21 Room / Location: [address] [place] [address] 03 [address] [facility] [address]    Anesthesia Start: 6547 Anesthesia Stop: 1582    Procedure: ARTHROSCOPY LEFT KNEE AND  LEFT MENISECTOMY(44852) (Left Knee) Diagnosis: (TORN LEFT MEDIAL MENISCUS)    Surgeons: Rosie Mitchell DO Responsible Provider: Mary Hernandez MD    Anesthesia Type: general ASA Status: 2          Anesthesia Type: general    Hammad Phase I: Hammad Score: 10    Hammad Phase II: Hammad Score: 10    Last vitals: Reviewed and per EMR flowsheets.        Anesthesia Post Evaluation    Patient location during evaluation: PACU  Patient participation: complete - patient participated  Level of consciousness: awake  Pain score: 3  Airway patency: patent  Nausea & Vomiting: no nausea  Complications: no  Cardiovascular status: blood pressure returned to baseline  Respiratory status: acceptable  Hydration status: euvolemic

## 2021-12-16 NOTE — H&P
Department of Orthopedic Trauma Surgery  Office History & Physical Exam      CHIEF COMPLAINT: Chronic left knee pain    HISTORY OF PRESENT ILLNESS:                The patient is a 46 y.o. female who presents with left knee pain that is been ongoing for several years. Patient states that she has had a meniscus tear that stems back from \"pushing my motorcycle around for years\". She endorses symptoms of pain, popping, occasional locking of the left knee. Has tried OTC medications with no relief. She denies any numbness, tingling, weakness in the left lower extremity. States that her pain limits her activities of daily living.        Past Medical History:        Diagnosis Date    Chronic back pain     Knee pain, bilateral     left knee pain     Past Surgical History:        Procedure Laterality Date    APPENDECTOMY  23years old     SECTION  age 19     Current Medications:   Current Facility-Administered Medications: 0.9 % sodium chloride infusion, , IntraVENous, Continuous  sodium chloride flush 0.9 % injection 5-40 mL, 5-40 mL, IntraVENous, 2 times per day  sodium chloride flush 0.9 % injection 5-40 mL, 5-40 mL, IntraVENous, PRN  0.9 % sodium chloride infusion, 25 mL, IntraVENous, PRN  povidone-iodine (BETADINE) 10 % ointment, , Topical, PRN  meperidine (DEMEROL) injection 25 mg, 25 mg, IntraVENous, Q5 Min PRN  fentaNYL (SUBLIMAZE) injection 25 mcg, 25 mcg, IntraVENous, Q5 Min PRN  fentaNYL (SUBLIMAZE) injection 50 mcg, 50 mcg, IntraVENous, Q5 Min PRN  HYDROmorphone (DILAUDID) injection 0.25 mg, 0.25 mg, IntraVENous, Q5 Min PRN  HYDROmorphone (DILAUDID) injection 0.5 mg, 0.5 mg, IntraVENous, Q5 Min PRN  bupivacaine (PF) (MARCAINE) 0.25 % injection, , , PRN  Facility-Administered Medications Ordered in Other Encounters: 0.9 % sodium chloride infusion, , IntraVENous, Continuous PRN  midazolam (VERSED) injection, , IntraVENous, PRN  propofol injection, , IntraVENous, PRN  lidocaine (cardiac) (XYLOCAINE) injection, , IntraVENous, PRN  ondansetron (ZOFRAN) injection, , IntraVENous, PRN  dexamethasone (DECADRON) injection, , IntraVENous, PRN  fentaNYL (SUBLIMAZE) injection, , IntraVENous, PRN  labetalol (NORMODYNE;TRANDATE) injection, , IntraVENous, PRN  Allergies:  Patient has no known allergies. Social History:   TOBACCO:   reports that she has been smoking cigarettes. She has a 30.00 pack-year smoking history. She has never used smokeless tobacco.  ETOH:   reports current alcohol use of about 4.0 standard drinks of alcohol per week. DRUGS:   reports current drug use. Drug: Marijuana Hulon Buck). ACTIVITIES OF DAILY LIVING:    OCCUPATION:    Family History:       Problem Relation Age of Onset    No Known Problems Father        REVIEW OF SYSTEMS:   Skin: no abnormal pigmentation, rash  Eyes: no blurring or eye pain   Ears/Nose/Throat: no hearing loss, tinnitus  Respiratory: No increased work of breathing, no coughing  Cardiovascular: Brisk capillary refill bilaterally, well perfused extremities  Gastrointestinal: no nausea, vomiting  Neurologic: no paralysis, or seizures  Musculoskeletal: Chronic left knee pain, torn meniscus      PHYSICAL EXAM:    VITALS:  /73   Pulse 95   Temp 97.5 °F (36.4 °C) (Infrared)   Resp 16   Ht 5' 2\" (1.575 m)   Wt 189 lb (85.7 kg)   SpO2 97%   BMI 34.57 kg/m²   Constitutional: Oriented to person, place, and time; Answer questions appropriately  HENT: Head: Normocephalic and atraumatic. Eyes: EOMI, SAYRA  Neck: Supple, trachea midline  Cardiovascular: Brisk capillary refill to all extremities, extremities well perfused  Pulmonary/Chest: No increased work of breathing, no cough  Abdominal: Non-tender, non-distended  Neurologic:  Awake, alert and oriented in three planes. No gross deficits   Musculoskeletal: There is joint line tenderness both medially and laterally. Moderate joint effusion appreciated. Sonya positive.         DATA:    CBC:   Lab Results   Component Value Date    WBC 10.5 06/15/2021    RBC 4.12 06/15/2021    HGB 13.3 06/15/2021    HCT 41.2 06/15/2021    .0 06/15/2021    MCH 32.3 06/15/2021    MCHC 32.3 06/15/2021    RDW 12.3 06/15/2021     06/15/2021    MPV 9.9 06/15/2021     Radiology Review:   Multiple views of the left knee reviewed. Advanced generative changes, more advanced in the medial compartment compared to lateral, there is decreased joint space as well as sclerosis and osteophyte formation. No fractures or dislocations noted. IMPRESSION: Left knee chronic medial meniscus tear, advanced osteoarthritis      PLAN:  1.  OR today for left knee arthroscopy and meniscectomy, possible chondroplasty  I have explained the risks and complications of the recommended surgery with the patient at length, as well as discussed potential treatment alternatives including nonoperative management. These risks include but are not limited to death or complication from anesthesia, continued pain, nerve tendon or vascular injury, infection, hematoma, deep vein thrombosis or pulmonary embolism, and need for further surgery, etc. Patient understood this, asked appropriate questions, which were all answered, and elected to proceed with the procedure.       Marcel Kellogg, DO

## 2021-12-16 NOTE — OP NOTE
[address]                                OPERATIVE REPORT    PATIENT NAME: Ramón Coreas                    :        1970  MED REC NO:   [id number]                            ROOM:  ACCOUNT NO:   [de-identified]                           ADMIT DATE: 2021  PROVIDER:     Serena Scott    DATE OF PROCEDURE:  2021    PREOPERATIVE DIAGNOSIS:  Torn meniscus, left knee. POSTOPERATIVE DIAGNOSES:  Torn meniscus, left knee with synovitis and  medial and lateral femoral condyle abrasions. OPERATIONS  1. Left knee arthroscopy with subtotal complex medial and lateral  meniscectomies. 2.  Arthroscopic modified synovectomy of the medial and lateral  compartments of the left knee. 3.  Arthroscopic debridement of medial and lateral femoral condyle  abrasions in chondroplasty fashion. SURGEON:  Serena Scott DO    OPERATIVE PROCEDURE:  The patient was brought to the operating theater  where general anesthetic was induced by Department of Anesthesiology. Left knee, leg, and foot were prepped and draped in usual sterile  fashion. The arthroscope was inserted through the inferolateral portal  and the knee joint distended with lactated Ringer's. We encountered  large 20-25 mL yellowish effusion upon entering the left knee joint  today. Egress drainage was established through the superomedial portal.  The knee joint was then inspected in a careful and orderly fashion  beginning at the suprapatellar pouch, medial and lateral gutters, and  patellofemoral joint. No bone chips or plica were seen in the  suprapatellar pouch upon inspection today. The kneecap was mildly  arthritic, in keeping with the patient's age group. ACL and PCL were  intact.   She had complex tears of medial lab meniscus, medial side worse  than lateral.  She also had quite a bit of synovitis in the knee joint  today and fairly large medial and lateral femoral condyle abrasions. A  nerve hook was then introduced through an inferomedial portal, and I  once again palpated the intact ACL and PCL as well as the tears of the  medial and lateral meniscus. The nerve hook was removed and a series of  large and small power adamaris were inserted to debride away the meniscus  tears and also to perform synovectomy. I then inserted power abraders  to clean up and debride the medial and lateral femoral condyle abrasions  in chondroplasty fashion. I then removed the instruments, irrigated the  knee joint thoroughly, and then drained the knee joint. The two  inferior portals were closed with 3-0 nylon suture. Sterile bulky  dressing was applied to the left knee. EBL was 10 mL. The patient  tolerated today's operation quite well and she was transferred back to  recovery room in satisfactory condition.         Brea Whaley    D: 12/16/2021 14:07:25       T: 12/16/2021 14:13:59     [id number]  Job#: [id number]     Doc#: [id number]    CC:  Jackie De La Torre DO

## 2022-01-11 ENCOUNTER — TELEPHONE (OUTPATIENT)
Dept: FAMILY MEDICINE CLINIC | Age: 52
End: 2022-01-11

## 2022-01-11 DIAGNOSIS — B00.1 RECURRENT COLD SORES: ICD-10-CM

## 2022-01-11 RX ORDER — VALACYCLOVIR HYDROCHLORIDE 1 G/1
TABLET, FILM COATED ORAL
Qty: 4 TABLET | Refills: 2 | Status: SHIPPED
Start: 2022-01-11 | End: 2022-06-09 | Stop reason: SDUPTHER

## 2022-02-22 ENCOUNTER — OFFICE VISIT (OUTPATIENT)
Dept: FAMILY MEDICINE CLINIC | Age: 52
End: 2022-02-22
Payer: COMMERCIAL

## 2022-02-22 VITALS
HEART RATE: 81 BPM | DIASTOLIC BLOOD PRESSURE: 76 MMHG | HEIGHT: 63 IN | BODY MASS INDEX: 33.49 KG/M2 | OXYGEN SATURATION: 96 % | TEMPERATURE: 96.5 F | RESPIRATION RATE: 16 BRPM | WEIGHT: 189 LBS | SYSTOLIC BLOOD PRESSURE: 128 MMHG

## 2022-02-22 DIAGNOSIS — M17.12 PRIMARY OSTEOARTHRITIS OF LEFT KNEE: Primary | ICD-10-CM

## 2022-02-22 PROCEDURE — 99213 OFFICE O/P EST LOW 20 MIN: CPT | Performed by: FAMILY MEDICINE

## 2022-02-22 RX ORDER — OXYCODONE HYDROCHLORIDE AND ACETAMINOPHEN 5; 325 MG/1; MG/1
1 TABLET ORAL EVERY 6 HOURS PRN
Qty: 56 TABLET | Refills: 0 | Status: SHIPPED | OUTPATIENT
Start: 2022-02-22 | End: 2022-03-08

## 2022-02-22 ASSESSMENT — ENCOUNTER SYMPTOMS
SHORTNESS OF BREATH: 0
COUGH: 0
CONSTIPATION: 0
VOMITING: 0
BLOOD IN STOOL: 0
WHEEZING: 0
ABDOMINAL PAIN: 0
DIARRHEA: 0
NAUSEA: 0

## 2022-02-22 NOTE — PROGRESS NOTES
Zeke Dotson (:  1970) is a 46 y.o. female,Established patient, here for evaluation of the following chief complaint(s):  Knee Pain (left sided ongoing )         ASSESSMENT/PLAN:  1. Primary osteoarthritis of left knee  -     External Referral To Orthopedic Surgery  -     oxyCODONE-acetaminophen (PERCOCET) 5-325 MG per tablet; Take 1 tablet by mouth every 6 hours as needed for Pain for up to 14 days. , Disp-56 tablet, R-0Normal  Controlled substances monitoring: no signs of potential drug abuse or diversion identified and OARRS report reviewed today- activity consistent with treatment plan. No follow-ups on file. Subjective   SUBJECTIVE/OBJECTIVE:  Knee pain Left. XR from  reviewed with patient showing Bone to bone contact medial Left knee as well as advanced DJD rest of Knee. This joint needs to be replaced. Review of Systems   Constitutional: Negative for chills, diaphoresis and fever. HENT: Negative for ear discharge, ear pain, hearing loss, nosebleeds and tinnitus. Respiratory: Negative for cough, shortness of breath and wheezing. Cardiovascular: Negative for chest pain. Gastrointestinal: Negative for abdominal pain, blood in stool, constipation, diarrhea, nausea and vomiting. Genitourinary: Negative for dysuria, flank pain and hematuria. Musculoskeletal: Negative for myalgias. Skin: Negative for rash. Neurological: Negative for headaches. Hematological: Does not bruise/bleed easily. Psychiatric/Behavioral: Negative for hallucinations and suicidal ideas. Objective   /76   Pulse 81   Temp 96.5 °F (35.8 °C)   Resp 16   Ht 5' 3\" (1.6 m)   Wt 189 lb (85.7 kg)   SpO2 96%   BMI 33.48 kg/m²   No results found for: LABA1C  Physical Exam  Constitutional:       General: She is not in acute distress. Appearance: She is well-developed. HENT:      Head: Normocephalic. Eyes:      General: No scleral icterus.   Neck:      Thyroid: No thyromegaly. Vascular: No JVD. Trachea: No tracheal deviation. Cardiovascular:      Rate and Rhythm: Normal rate and regular rhythm. Heart sounds: No gallop. Pulmonary:      Effort: No respiratory distress. Breath sounds: No wheezing. Abdominal:      General: Abdomen is flat. Musculoskeletal:         General: No tenderness. Comments: Severe pain Left knee on working as well as bending, popping and clicking. Skin:     General: Skin is warm. Capillary Refill: Capillary refill takes less than 2 seconds. Findings: No erythema. Neurological:      Deep Tendon Reflexes: Reflexes normal.   Psychiatric:         Mood and Affect: Mood normal.            On this date 2/22/2022 I have spent 23 minutes reviewing previous notes, test results and face to face with the patient discussing the diagnosis and importance of compliance with the treatment plan as well as documenting on the day of the visit. An electronic signature was used to authenticate this note.     --Jethro Shafer, DO

## 2022-03-10 RX ORDER — SODIUM CHLORIDE, SODIUM LACTATE, POTASSIUM CHLORIDE, CALCIUM CHLORIDE 600; 310; 30; 20 MG/100ML; MG/100ML; MG/100ML; MG/100ML
INJECTION, SOLUTION INTRAVENOUS CONTINUOUS
Status: CANCELLED | OUTPATIENT
Start: 2022-03-10

## 2022-03-11 ENCOUNTER — HOSPITAL ENCOUNTER (OUTPATIENT)
Dept: PREADMISSION TESTING | Age: 52
Discharge: HOME | End: 2022-03-11

## 2022-03-11 DIAGNOSIS — Z01.818 PREOP TESTING: Primary | ICD-10-CM

## 2022-03-14 ENCOUNTER — ANESTHESIA EVENT (OUTPATIENT)
Dept: OPERATING ROOM | Age: 52
End: 2022-03-14
Payer: COMMERCIAL

## 2022-03-14 ENCOUNTER — HOSPITAL ENCOUNTER (OUTPATIENT)
Dept: GENERAL RADIOLOGY | Age: 52
Discharge: HOME | End: 2022-03-16
Payer: COMMERCIAL

## 2022-03-14 ENCOUNTER — HOSPITAL ENCOUNTER (OUTPATIENT)
Dept: PREADMISSION TESTING | Age: 52
Discharge: HOME | End: 2022-03-14
Payer: COMMERCIAL

## 2022-03-14 VITALS
HEIGHT: 63 IN | WEIGHT: 192 LBS | DIASTOLIC BLOOD PRESSURE: 70 MMHG | OXYGEN SATURATION: 96 % | HEART RATE: 80 BPM | RESPIRATION RATE: 18 BRPM | TEMPERATURE: 97.6 F | SYSTOLIC BLOOD PRESSURE: 142 MMHG | BODY MASS INDEX: 34.02 KG/M2

## 2022-03-14 DIAGNOSIS — M17.12 OSTEOARTHRITIS OF LEFT KNEE, UNSPECIFIED OSTEOARTHRITIS TYPE: ICD-10-CM

## 2022-03-14 DIAGNOSIS — M25.562 CHRONIC PAIN OF LEFT KNEE: Primary | ICD-10-CM

## 2022-03-14 DIAGNOSIS — G89.29 CHRONIC PAIN OF LEFT KNEE: Primary | ICD-10-CM

## 2022-03-14 LAB
ALBUMIN SERPL-MCNC: 4.3 G/DL (ref 3.5–5.2)
ALP BLD-CCNC: 102 U/L (ref 35–104)
ALT SERPL-CCNC: 29 U/L (ref 0–32)
ANION GAP SERPL CALCULATED.3IONS-SCNC: 14 MMOL/L (ref 7–16)
APTT: 34.3 SEC (ref 24.5–35.1)
AST SERPL-CCNC: 29 U/L (ref 0–31)
BILIRUB SERPL-MCNC: 0.2 MG/DL (ref 0–1.2)
BILIRUBIN URINE: NEGATIVE
BLOOD, URINE: NEGATIVE
BUN BLDV-MCNC: 5 MG/DL (ref 6–20)
CALCIUM SERPL-MCNC: 9 MG/DL (ref 8.6–10.2)
CHLORIDE BLD-SCNC: 96 MMOL/L (ref 98–107)
CLARITY: CLEAR
CO2: 20 MMOL/L (ref 22–29)
COLOR: YELLOW
CREAT SERPL-MCNC: 0.6 MG/DL (ref 0.5–1)
EKG ATRIAL RATE: 80 BPM
EKG P AXIS: 53 DEGREES
EKG P-R INTERVAL: 156 MS
EKG Q-T INTERVAL: 384 MS
EKG QRS DURATION: 92 MS
EKG QTC CALCULATION (BAZETT): 442 MS
EKG R AXIS: 77 DEGREES
EKG T AXIS: 44 DEGREES
EKG VENTRICULAR RATE: 80 BPM
GFR AFRICAN AMERICAN: >60
GFR NON-AFRICAN AMERICAN: >60 ML/MIN/1.73
GLUCOSE BLD-MCNC: 95 MG/DL (ref 74–99)
GLUCOSE URINE: NEGATIVE MG/DL
HBA1C MFR BLD: 5.3 % (ref 4–5.6)
HCT VFR BLD CALC: 40.8 % (ref 34–48)
HEMOGLOBIN: 13.6 G/DL (ref 11.5–15.5)
INR BLD: 1
KETONES, URINE: NEGATIVE MG/DL
LEUKOCYTE ESTERASE, URINE: NEGATIVE
MCH RBC QN AUTO: 32.2 PG (ref 26–35)
MCHC RBC AUTO-ENTMCNC: 33.3 % (ref 32–34.5)
MCV RBC AUTO: 96.7 FL (ref 80–99.9)
NITRITE, URINE: NEGATIVE
PDW BLD-RTO: 12.2 FL (ref 11.5–15)
PH UA: 5.5 (ref 5–9)
PLATELET # BLD: 318 E9/L (ref 130–450)
PMV BLD AUTO: 9.6 FL (ref 7–12)
POTASSIUM REFLEX MAGNESIUM: 3.7 MMOL/L (ref 3.5–5)
PREALBUMIN: 19 MG/DL (ref 20–40)
PROTEIN UA: NEGATIVE MG/DL
PROTHROMBIN TIME: 12 SEC (ref 9.3–12.4)
RBC # BLD: 4.22 E12/L (ref 3.5–5.5)
SODIUM BLD-SCNC: 130 MMOL/L (ref 132–146)
SPECIFIC GRAVITY UA: <=1.005 (ref 1–1.03)
TOTAL PROTEIN: 7.5 G/DL (ref 6.4–8.3)
UROBILINOGEN, URINE: 0.2 E.U./DL
WBC # BLD: 12.3 E9/L (ref 4.5–11.5)

## 2022-03-14 PROCEDURE — 81003 URINALYSIS AUTO W/O SCOPE: CPT

## 2022-03-14 PROCEDURE — 73560 X-RAY EXAM OF KNEE 1 OR 2: CPT

## 2022-03-14 PROCEDURE — 93010 ELECTROCARDIOGRAM REPORT: CPT | Performed by: INTERNAL MEDICINE

## 2022-03-14 PROCEDURE — 85730 THROMBOPLASTIN TIME PARTIAL: CPT

## 2022-03-14 PROCEDURE — 84134 ASSAY OF PREALBUMIN: CPT

## 2022-03-14 PROCEDURE — 87088 URINE BACTERIA CULTURE: CPT

## 2022-03-14 PROCEDURE — 71046 X-RAY EXAM CHEST 2 VIEWS: CPT

## 2022-03-14 PROCEDURE — 83036 HEMOGLOBIN GLYCOSYLATED A1C: CPT

## 2022-03-14 PROCEDURE — 85027 COMPLETE CBC AUTOMATED: CPT

## 2022-03-14 PROCEDURE — 93005 ELECTROCARDIOGRAM TRACING: CPT | Performed by: ANESTHESIOLOGY

## 2022-03-14 PROCEDURE — 80053 COMPREHEN METABOLIC PANEL: CPT

## 2022-03-14 PROCEDURE — 36415 COLL VENOUS BLD VENIPUNCTURE: CPT

## 2022-03-14 PROCEDURE — 87081 CULTURE SCREEN ONLY: CPT

## 2022-03-14 PROCEDURE — 85610 PROTHROMBIN TIME: CPT

## 2022-03-14 RX ORDER — MIDAZOLAM HYDROCHLORIDE 1 MG/ML
1 INJECTION INTRAMUSCULAR; INTRAVENOUS EVERY 5 MIN PRN
Status: CANCELLED | OUTPATIENT
Start: 2022-03-14

## 2022-03-14 RX ORDER — ROPIVACAINE HYDROCHLORIDE 5 MG/ML
30 INJECTION, SOLUTION EPIDURAL; INFILTRATION; PERINEURAL
Status: CANCELLED | OUTPATIENT
Start: 2022-03-14 | End: 2022-03-14

## 2022-03-14 RX ORDER — FENTANYL CITRATE 50 UG/ML
100 INJECTION, SOLUTION INTRAMUSCULAR; INTRAVENOUS ONCE
Status: CANCELLED | OUTPATIENT
Start: 2022-03-14 | End: 2022-03-14

## 2022-03-14 RX ORDER — OXYCODONE HYDROCHLORIDE AND ACETAMINOPHEN 5; 325 MG/1; MG/1
1 TABLET ORAL EVERY 6 HOURS PRN
Status: ON HOLD | COMMUNITY
End: 2022-03-17 | Stop reason: HOSPADM

## 2022-03-14 RX ORDER — OMEPRAZOLE 20 MG/1
40 CAPSULE, DELAYED RELEASE ORAL DAILY
COMMUNITY

## 2022-03-14 ASSESSMENT — PAIN DESCRIPTION - PROGRESSION: CLINICAL_PROGRESSION: GRADUALLY WORSENING

## 2022-03-14 ASSESSMENT — PAIN DESCRIPTION - DESCRIPTORS: DESCRIPTORS: ACHING

## 2022-03-14 ASSESSMENT — PAIN DESCRIPTION - PAIN TYPE: TYPE: CHRONIC PAIN

## 2022-03-14 ASSESSMENT — PAIN DESCRIPTION - LOCATION: LOCATION: KNEE

## 2022-03-14 ASSESSMENT — PAIN DESCRIPTION - ONSET: ONSET: ON-GOING

## 2022-03-14 ASSESSMENT — PAIN DESCRIPTION - FREQUENCY: FREQUENCY: INTERMITTENT

## 2022-03-14 ASSESSMENT — PAIN SCALES - GENERAL: PAINLEVEL_OUTOF10: 7

## 2022-03-14 ASSESSMENT — LIFESTYLE VARIABLES: SMOKING_STATUS: 1

## 2022-03-14 ASSESSMENT — PAIN DESCRIPTION - ORIENTATION: ORIENTATION: LEFT

## 2022-03-14 ASSESSMENT — PAIN - FUNCTIONAL ASSESSMENT: PAIN_FUNCTIONAL_ASSESSMENT: PREVENTS OR INTERFERES SOME ACTIVE ACTIVITIES AND ADLS

## 2022-03-14 NOTE — ANESTHESIA PRE PROCEDURE
Department of Anesthesiology  Preprocedure Note       Name:  Carlos Cantu   Age:  46 y.o.  :  1970                                          MRN:  [id number]         Date:  3/14/2022      Surgeon: Georgi Barraza):  Gonzalo Yates DO    Procedure: Procedure(s):  LEFT TOTAL KNEE REPLACEMENT    Medications prior to admission:   Prior to Admission medications    Medication Sig Start Date End Date Taking?  Authorizing Provider   valACYclovir (VALTREX) 1 g tablet take 1 tablet by mouth once daily 22   Jonh Lund DO       Current medications:    Current Outpatient Medications   Medication Sig Dispense Refill    valACYclovir (VALTREX) 1 g tablet take 1 tablet by mouth once daily 4 tablet 2     Current Facility-Administered Medications   Medication Dose Route Frequency Provider Last Rate Last Admin    lidocaine-EPINEPHrine 1 percent-1:195799 injection 3 mL  3 mL IntraDERmal Once Bola Price MD           Allergies:  No Known Allergies    Problem List:    Patient Active Problem List   Diagnosis Code    Knee pain, bilateral M25.561, M25.562    Recurrent cold sores B00.1    Maxillary sinusitis J32.0    Midline low back pain without sciatica M54.50    Tobacco abuse counseling Z71.6    Chronic pain of left knee M25.562, G89.29    Numbness and tingling of right leg R20.0, R20.2    Impingement syndrome of left shoulder M75.42       Past Medical History:        Diagnosis Date    Chronic back pain     Knee pain, bilateral     left knee pain       Past Surgical History:        Procedure Laterality Date    APPENDECTOMY  23years old     SECTION  age 19    KNEE ARTHROSCOPY Left 2021    ARTHROSCOPY LEFT KNEE AND  LEFT MENISECTOMY(11803) performed by Gonzalo Yates DO at [address] [facility] History:    Social History     Tobacco Use    Smoking status: Current Every Day Smoker     Packs/day: 1.00     Years: 30.00     Pack years: 30.00     Types: Cigarettes    Smokeless tobacco: Never Used   Substance Use Topics    Alcohol use: Yes     Alcohol/week: 4.0 standard drinks     Types: 4 Cans of beer per week     Comment: daily                                Ready to quit: Not Answered  Counseling given: Not Answered      Vital Signs (Current): There were no vitals filed for this visit. BP Readings from Last 3 Encounters:   02/22/22 128/76   12/16/21 107/74   12/16/21 130/72       NPO Status:                                                                                 BMI:   Wt Readings from Last 3 Encounters:   02/22/22 189 lb (85.7 kg)   12/16/21 189 lb (85.7 kg)   12/10/21 196 lb (88.9 kg)     There is no height or weight on file to calculate BMI.    CBC:   Lab Results   Component Value Date    WBC 10.5 06/15/2021    RBC 4.12 06/15/2021    HGB 13.3 06/15/2021    HCT 41.2 06/15/2021    .0 06/15/2021    RDW 12.3 06/15/2021     06/15/2021       CMP:   Lab Results   Component Value Date     06/15/2021    K 4.5 06/15/2021    CL 97 06/15/2021    CO2 22 06/15/2021    BUN 8 06/15/2021    CREATININE 0.8 06/15/2021    GFRAA >60 06/15/2021    LABGLOM >60 06/15/2021    GLUCOSE 92 06/15/2021    PROT 7.8 06/15/2021    CALCIUM 9.3 06/15/2021    BILITOT 0.3 06/15/2021    ALKPHOS 91 06/15/2021    AST 34 06/15/2021    ALT 36 06/15/2021       POC Tests: No results for input(s): POCGLU, POCNA, POCK, POCCL, POCBUN, POCHEMO, POCHCT in the last 72 hours.     Coags: No results found for: PROTIME, INR, APTT    HCG (If Applicable): No results found for: PREGTESTUR, PREGSERUM, HCG, HCGQUANT     ABGs: No results found for: PHART, PO2ART, EAK5PII, IWN8ZIS, BEART, K7ZXBSIU     Type & Screen (If Applicable):  No results found for: LABABO, LABRH    Drug/Infectious Status (If Applicable):  No results found for: HIV, HEPCAB    COVID-19 Screening (If Applicable):   Lab Results   Component Value Date    COVID19 Not Detected 12/10/2021 Anesthesia Evaluation  Patient summary reviewed  Airway: Mallampati: III  TM distance: >3 FB   Neck ROM: full  Mouth opening: > = 3 FB Dental: normal exam     Comment: Dentition intact, multiple missing molars, denies any loose teeth. Pulmonary: breath sounds clear to auscultation  (+) decreased breath sounds,  current smoker ( Current Every Day Smoker - 0.5 - 1.0 PPD x 35 years )          Patient did not smoke on day of surgery. Cardiovascular:Negative CV ROS    (+) hyperlipidemia        Rhythm: regular  Rate: normal                    Neuro/Psych:   Negative Neuro/Psych ROS  (+) neuromuscular disease ( Chronic back pain):,             GI/Hepatic/Renal:   (+) GERD:,           Endo/Other: Negative Endo/Other ROS   (+) : arthritis: OA., .                 Abdominal:   (+) obese,           Vascular: Other Findings:             Anesthesia Plan      general, regional and spinal     ASA 3     (Left Adductor canal single shot block. Back - up general.)  Induction: intravenous. MIPS: Postoperative opioids intended and Prophylactic antiemetics administered. Anesthetic plan and risks discussed with patient. Plan discussed with CRNA. Sheron Foster MD   3/14/2022      Pt seen questions answered plan outlined H&P reviewed plan outlined pt examined accepts spinal and Adductor canal block for LTKR.   Marcos Cueva M.D.

## 2022-03-14 NOTE — PROGRESS NOTES
Patient attended preoperative Total Joint Camp on 3/14/2022. Patient is scheduled to have an elective knee replacement. Patient was educated regarding Disease Process, Medications, Smoking Cessation, Oxygenation, Incentive Spirometry and Deep Breath and Cough, signs and symptoms of postoperative joint infection that include: Fever, Chills, Pain Control, Drainage and Redness, post-op follow up with orthopaedic surgeon, dressing removal, staple removal, ambulatory devices which include a wheeled walker and cane, bed mobility, correct anatomical alignment, active range of motion, proper transferring technique, incision care, infection prevention measures, non-pharmacologic comfort measures, notification of inadequate pain control measures, pain scale for assessing level of pain, pharmacologic pain management, relaxation techniques.

## 2022-03-14 NOTE — CARE COORDINATION
SS Note:  met with pt in PAT for transition of care as pt is scheduled for knee surgery on 3/17/22. Pt stated she resides with her , bedroom and bathroom both on first floor, no steps to enter home. Stated is independent and employed, uses cane at times for ambulation. Stated no past SNF or HHC. Stated her PCP is Dr. Rajeev Rao. Patient has prescription coverage, uses [employer]. Stated owns cane and crutches, plans to borrow wheeled walker and a shower chair, denies need for a bedside commode. Pt plans to return home upon discharge,  to transport pt home. Pt denies any DME needs. SW provided [facility] list, pt stated no preference. KRISTEN called referral to Marquez Nunes at [facility], to call and advise this SW if pt is accepted, will need [facility] orders. Electronically signed by KYREE Camara on 3/14/2022 at 3:22 PM      Addendum: KRISTEN spoke with MANUEL [facility], they are able to accept pt, will need [facility] orders.   Electronically signed by KYREE Camara on 3/14/2022 at 4:13 PM

## 2022-03-14 NOTE — PROGRESS NOTES
[facility]                                                                                                                    PRE OP INSTRUCTIONS FOR  Peter Braden        Date: 3/14/2022    Date of surgery: 03/17/22   Arrival Time: Hospital will call you between 5pm and 7pm with your final arrival time for surgery    1. Do not eat or drink anything after midnight prior to surgery. This includes no water, chewing gum, mints or ice chips. 2. Take the following medications with a small sip of water on the morning of Surgery: percocet     3. Diabetics may take evening dose of insulin but none after midnight. If you feel symptomatic or low blood sugar morning of surgery drink 1-2 ounces of apple juice only. 4. Aspirin, Ibuprofen, Advil, Naproxen, Vitamin E and other Anti-inflammatory products should be stopped  before surgery  as directed by your physician. Take Tylenol only unless instructed otherwise by your surgeon. 5. Check with your Doctor regarding stopping Plavix, Coumadin, Lovenox, Eliquis, Effient, or other blood thinners. 6. Do not smoke,use illicit drugs and do not drink any alcoholic beverages 24 hours prior to surgery. 7. You may brush your teeth the morning of surgery. DO NOT SWALLOW WATER    8. You MUST make arrangements for a responsible adult to take you home after your surgery. You will not be allowed to leave alone or drive yourself home. It is strongly suggested someone stay with you the first 24 hrs. Your surgery will be cancelled if you do not have a ride home. 9. PEDIATRIC PATIENTS ONLY:  A parent/legal guardian must accompany a child scheduled for surgery and plan to stay at the hospital until the child is discharged. Please do not bring other children with you.     10. Please wear simple, loose fitting clothing to the hospital.  [employer] not bring valuables (money, credit cards, checkbooks, etc.) Do not wear any makeup (including no eye makeup) or nail polish on your fingers or toes. 11. DO NOT wear any jewelry or piercings on day of surgery. All body piercing jewelry must be removed. 12. Shower the night before surgery with ___Antibacterial soap /KARI WIPES________    13. TOTAL JOINT REPLACEMENT/HYSTERECTOMY PATIENTS ONLY---Remember to bring Blood Bank bracelet to the hospital on the day of surgery. 14. If you have a Living Will and Durable Power of  for Healthcare, please bring in a copy. 15. If appropriate bring crutches, inspirex, WALKER, CANE etc... 12. Notify your Surgeon if you develop any illness between now and surgery time, cough, cold, fever, sore throat, nausea, vomiting, etc.  Please notify your surgeon if you experience dizziness, shortness of breath or blurred vision between now & the time of your surgery. 17. If you have ___dentures, they will be removed before going to the OR; we will provide you a container. If you wear ___contact lenses or _x__glasses, they will be removed; please bring a case for them. 18. To provide excellent care visitors will be limited to 1 in the room at any given time. 19. Please bring picture ID and insurance card. 20. Sleep apnea patients need to bring CPAP AND SETTINGS to hospital on day of surgery. 21. During flu season no children under the age of 15 are permitted in the hospital for the safety of all patients. 22. Other Please come to main lobby and information desk on day of surgery       Please call AMBULATORY CARE if you have any further questions.    [facility]     [address]

## 2022-03-15 ENCOUNTER — OFFICE VISIT (OUTPATIENT)
Dept: FAMILY MEDICINE CLINIC | Age: 52
End: 2022-03-15
Payer: COMMERCIAL

## 2022-03-15 VITALS
OXYGEN SATURATION: 96 % | TEMPERATURE: 97.1 F | HEIGHT: 63 IN | DIASTOLIC BLOOD PRESSURE: 80 MMHG | SYSTOLIC BLOOD PRESSURE: 119 MMHG | WEIGHT: 188 LBS | RESPIRATION RATE: 16 BRPM | HEART RATE: 85 BPM | BODY MASS INDEX: 33.31 KG/M2

## 2022-03-15 DIAGNOSIS — Z01.818 PRE-OP EXAM: ICD-10-CM

## 2022-03-15 DIAGNOSIS — Z71.6 TOBACCO ABUSE COUNSELING: Primary | ICD-10-CM

## 2022-03-15 DIAGNOSIS — M17.12 PRIMARY OSTEOARTHRITIS OF LEFT KNEE: ICD-10-CM

## 2022-03-15 PROCEDURE — 99214 OFFICE O/P EST MOD 30 MIN: CPT | Performed by: FAMILY MEDICINE

## 2022-03-15 ASSESSMENT — ENCOUNTER SYMPTOMS
WHEEZING: 0
NAUSEA: 0
ABDOMINAL PAIN: 0
SHORTNESS OF BREATH: 0
CONSTIPATION: 0
BLOOD IN STOOL: 0
DIARRHEA: 0
COUGH: 0
VOMITING: 0

## 2022-03-15 NOTE — PROGRESS NOTES
Audrey Rueda (:  1970) is a 46 y.o. female,Established patient, here for evaluation of the following chief complaint(s):  Pre-op Exam (left knee 22)         ASSESSMENT/PLAN:  1. Tobacco abuse counseling  2. Pre-op exam  3. Primary osteoarthritis of left knee      No follow-ups on file. Subjective   SUBJECTIVE/OBJECTIVE:  Pre-op Exam (left knee 22)      Review of Systems   Constitutional: Negative for chills, diaphoresis and fever. HENT: Negative for ear discharge, ear pain, hearing loss, nosebleeds and tinnitus. Respiratory: Negative for cough, shortness of breath and wheezing. Cardiovascular: Negative for chest pain. Gastrointestinal: Negative for abdominal pain, blood in stool, constipation, diarrhea, nausea and vomiting. Genitourinary: Negative for dysuria, flank pain and hematuria. Musculoskeletal: Negative for myalgias. Skin: Negative for rash. Neurological: Negative for headaches. Hematological: Does not bruise/bleed easily. Psychiatric/Behavioral: Negative for hallucinations and suicidal ideas. Objective   /80   Pulse 85   Temp 97.1 °F (36.2 °C) (Temporal)   Resp 16   Ht 5' 3\" (1.6 m)   Wt 188 lb (85.3 kg)   SpO2 96%   BMI 33.30 kg/m²   Lab Results   Component Value Date    LABA1C 5.3 2022     Physical Exam  Constitutional:       General: She is not in acute distress. Appearance: She is well-developed. Eyes:      General: No scleral icterus. Neck:      Thyroid: No thyromegaly. Vascular: No JVD. Trachea: No tracheal deviation. Cardiovascular:      Heart sounds: No gallop. Pulmonary:      Effort: No respiratory distress. Breath sounds: No wheezing. Musculoskeletal:         General: No tenderness. Skin:     Findings: No erythema.    Neurological:      Deep Tendon Reflexes: Reflexes normal.            On this date 3/15/2022 I have spent 24 minutes reviewing previous notes, test results and face to face with the patient discussing the diagnosis and importance of compliance with the treatment plan as well as documenting on the day of the visit. Cleared for Left TKR. I recommended 68g protein per day pre and post op. An electronic signature was used to authenticate this note.     --Willow Hash, DO

## 2022-03-16 LAB
MRSA CULTURE ONLY: NORMAL
URINE CULTURE, ROUTINE: NORMAL

## 2022-03-17 ENCOUNTER — ANESTHESIA (OUTPATIENT)
Dept: OPERATING ROOM | Age: 52
End: 2022-03-17
Payer: COMMERCIAL

## 2022-03-17 ENCOUNTER — HOSPITAL ENCOUNTER (OUTPATIENT)
Age: 52
Setting detail: OBSERVATION
Discharge: HOME | End: 2022-03-18
Attending: ORTHOPAEDIC SURGERY | Admitting: ORTHOPAEDIC SURGERY
Payer: COMMERCIAL

## 2022-03-17 ENCOUNTER — APPOINTMENT (OUTPATIENT)
Dept: GENERAL RADIOLOGY | Age: 52
End: 2022-03-17
Attending: ORTHOPAEDIC SURGERY
Payer: COMMERCIAL

## 2022-03-17 VITALS
TEMPERATURE: 97.9 F | DIASTOLIC BLOOD PRESSURE: 64 MMHG | SYSTOLIC BLOOD PRESSURE: 92 MMHG | RESPIRATION RATE: 12 BRPM | OXYGEN SATURATION: 96 %

## 2022-03-17 DIAGNOSIS — G89.29 CHRONIC PAIN OF LEFT KNEE: Primary | ICD-10-CM

## 2022-03-17 DIAGNOSIS — M25.562 CHRONIC PAIN OF LEFT KNEE: Primary | ICD-10-CM

## 2022-03-17 DIAGNOSIS — Z96.652 STATUS POST TOTAL LEFT KNEE REPLACEMENT: ICD-10-CM

## 2022-03-17 PROCEDURE — 6360000002 HC RX W HCPCS

## 2022-03-17 PROCEDURE — 97165 OT EVAL LOW COMPLEX 30 MIN: CPT

## 2022-03-17 PROCEDURE — 7100000011 HC PHASE II RECOVERY - ADDTL 15 MIN: Performed by: ORTHOPAEDIC SURGERY

## 2022-03-17 PROCEDURE — 97530 THERAPEUTIC ACTIVITIES: CPT | Performed by: PHYSICAL THERAPIST

## 2022-03-17 PROCEDURE — 6360000002 HC RX W HCPCS: Performed by: ANESTHESIOLOGY

## 2022-03-17 PROCEDURE — 3600000005 HC SURGERY LEVEL 5 BASE: Performed by: ORTHOPAEDIC SURGERY

## 2022-03-17 PROCEDURE — C1713 ANCHOR/SCREW BN/BN,TIS/BN: HCPCS | Performed by: ORTHOPAEDIC SURGERY

## 2022-03-17 PROCEDURE — 3600000015 HC SURGERY LEVEL 5 ADDTL 15MIN: Performed by: ORTHOPAEDIC SURGERY

## 2022-03-17 PROCEDURE — 6370000000 HC RX 637 (ALT 250 FOR IP): Performed by: ORTHOPAEDIC SURGERY

## 2022-03-17 PROCEDURE — 2580000003 HC RX 258: Performed by: ORTHOPAEDIC SURGERY

## 2022-03-17 PROCEDURE — 64447 NJX AA&/STRD FEMORAL NRV IMG: CPT | Performed by: ANESTHESIOLOGY

## 2022-03-17 PROCEDURE — 6360000002 HC RX W HCPCS: Performed by: ORTHOPAEDIC SURGERY

## 2022-03-17 PROCEDURE — 2709999900 HC NON-CHARGEABLE SUPPLY: Performed by: ORTHOPAEDIC SURGERY

## 2022-03-17 PROCEDURE — 97161 PT EVAL LOW COMPLEX 20 MIN: CPT | Performed by: PHYSICAL THERAPIST

## 2022-03-17 PROCEDURE — 97535 SELF CARE MNGMENT TRAINING: CPT

## 2022-03-17 PROCEDURE — 7100000001 HC PACU RECOVERY - ADDTL 15 MIN: Performed by: ORTHOPAEDIC SURGERY

## 2022-03-17 PROCEDURE — 88311 DECALCIFY TISSUE: CPT

## 2022-03-17 PROCEDURE — 2580000003 HC RX 258

## 2022-03-17 PROCEDURE — 3700000000 HC ANESTHESIA ATTENDED CARE: Performed by: ORTHOPAEDIC SURGERY

## 2022-03-17 PROCEDURE — 88305 TISSUE EXAM BY PATHOLOGIST: CPT

## 2022-03-17 PROCEDURE — 7100000000 HC PACU RECOVERY - FIRST 15 MIN: Performed by: ORTHOPAEDIC SURGERY

## 2022-03-17 PROCEDURE — 6370000000 HC RX 637 (ALT 250 FOR IP): Performed by: ANESTHESIOLOGY

## 2022-03-17 PROCEDURE — 7100000010 HC PHASE II RECOVERY - FIRST 15 MIN: Performed by: ORTHOPAEDIC SURGERY

## 2022-03-17 PROCEDURE — 3700000001 HC ADD 15 MINUTES (ANESTHESIA): Performed by: ORTHOPAEDIC SURGERY

## 2022-03-17 PROCEDURE — 73560 X-RAY EXAM OF KNEE 1 OR 2: CPT

## 2022-03-17 PROCEDURE — 97110 THERAPEUTIC EXERCISES: CPT | Performed by: PHYSICAL THERAPIST

## 2022-03-17 PROCEDURE — 97530 THERAPEUTIC ACTIVITIES: CPT

## 2022-03-17 PROCEDURE — G0378 HOSPITAL OBSERVATION PER HR: HCPCS

## 2022-03-17 PROCEDURE — 2500000003 HC RX 250 WO HCPCS

## 2022-03-17 PROCEDURE — C1776 JOINT DEVICE (IMPLANTABLE): HCPCS | Performed by: ORTHOPAEDIC SURGERY

## 2022-03-17 PROCEDURE — 6360000002 HC RX W HCPCS: Performed by: NURSE ANESTHETIST, CERTIFIED REGISTERED

## 2022-03-17 DEVICE — COMPONENT PAT DIA32MM KNEE POLY CEM MEDIALIZED ANAT ATTUNE: Type: IMPLANTABLE DEVICE | Site: KNEE | Status: FUNCTIONAL

## 2022-03-17 DEVICE — KNEE K1 TOT HEMI STD CEM IMPL CAPPED SYNTHES: Type: IMPLANTABLE DEVICE | Status: FUNCTIONAL

## 2022-03-17 DEVICE — BASEPLATE TIB SZ 4 FIX BEAR CEM S+ TECHNOLOGY ATTUNE: Type: IMPLANTABLE DEVICE | Site: KNEE | Status: FUNCTIONAL

## 2022-03-17 DEVICE — COMPONENT FEM SZ 6 L KNEE NAR POST STBL CEM ATTUNE: Type: IMPLANTABLE DEVICE | Site: KNEE | Status: FUNCTIONAL

## 2022-03-17 DEVICE — CEMENT BNE GENTAMICIN 40 GM SMARTSET GMV: Type: IMPLANTABLE DEVICE | Site: KNEE | Status: FUNCTIONAL

## 2022-03-17 DEVICE — INSERT TIB SZ 6 THK7MM KNEE POST STBL FIX BEAR ATTUNE: Type: IMPLANTABLE DEVICE | Site: KNEE | Status: FUNCTIONAL

## 2022-03-17 RX ORDER — SODIUM CHLORIDE 0.9 % (FLUSH) 0.9 %
5-40 SYRINGE (ML) INJECTION PRN
Status: DISCONTINUED | OUTPATIENT
Start: 2022-03-17 | End: 2022-03-18 | Stop reason: HOSPADM

## 2022-03-17 RX ORDER — POVIDONE-IODINE 10 MG/G
OINTMENT TOPICAL PRN
Status: DISCONTINUED | OUTPATIENT
Start: 2022-03-17 | End: 2022-03-18 | Stop reason: HOSPADM

## 2022-03-17 RX ORDER — HYDROCODONE BITARTRATE AND ACETAMINOPHEN 5; 325 MG/1; MG/1
1 TABLET ORAL EVERY 4 HOURS PRN
Qty: 42 TABLET | Refills: 0 | Status: SHIPPED | OUTPATIENT
Start: 2022-03-17 | End: 2022-03-24

## 2022-03-17 RX ORDER — SODIUM CHLORIDE 9 MG/ML
INJECTION, SOLUTION INTRAVENOUS CONTINUOUS PRN
Status: DISCONTINUED | OUTPATIENT
Start: 2022-03-17 | End: 2022-03-17 | Stop reason: SDUPTHER

## 2022-03-17 RX ORDER — FENTANYL CITRATE 50 UG/ML
INJECTION, SOLUTION INTRAMUSCULAR; INTRAVENOUS PRN
Status: DISCONTINUED | OUTPATIENT
Start: 2022-03-17 | End: 2022-03-17 | Stop reason: SDUPTHER

## 2022-03-17 RX ORDER — SODIUM CHLORIDE 0.9 % (FLUSH) 0.9 %
5-40 SYRINGE (ML) INJECTION EVERY 12 HOURS SCHEDULED
Status: DISCONTINUED | OUTPATIENT
Start: 2022-03-17 | End: 2022-03-17 | Stop reason: HOSPADM

## 2022-03-17 RX ORDER — ROPIVACAINE HYDROCHLORIDE 5 MG/ML
30 INJECTION, SOLUTION EPIDURAL; INFILTRATION; PERINEURAL
Status: DISCONTINUED | OUTPATIENT
Start: 2022-03-17 | End: 2022-03-17 | Stop reason: HOSPADM

## 2022-03-17 RX ORDER — PROPOFOL 10 MG/ML
INJECTION, EMULSION INTRAVENOUS CONTINUOUS PRN
Status: DISCONTINUED | OUTPATIENT
Start: 2022-03-17 | End: 2022-03-17 | Stop reason: SDUPTHER

## 2022-03-17 RX ORDER — MEPERIDINE HYDROCHLORIDE 25 MG/ML
25 INJECTION INTRAMUSCULAR; INTRAVENOUS; SUBCUTANEOUS EVERY 5 MIN PRN
Status: DISCONTINUED | OUTPATIENT
Start: 2022-03-17 | End: 2022-03-17 | Stop reason: HOSPADM

## 2022-03-17 RX ORDER — PROPOFOL 10 MG/ML
INJECTION, EMULSION INTRAVENOUS PRN
Status: DISCONTINUED | OUTPATIENT
Start: 2022-03-17 | End: 2022-03-17 | Stop reason: SDUPTHER

## 2022-03-17 RX ORDER — MIDAZOLAM HYDROCHLORIDE 1 MG/ML
1 INJECTION INTRAMUSCULAR; INTRAVENOUS EVERY 5 MIN PRN
Status: COMPLETED | OUTPATIENT
Start: 2022-03-17 | End: 2022-03-17

## 2022-03-17 RX ORDER — ROPIVACAINE HYDROCHLORIDE 5 MG/ML
INJECTION, SOLUTION EPIDURAL; INFILTRATION; PERINEURAL
Status: DISPENSED
Start: 2022-03-17 | End: 2022-03-17

## 2022-03-17 RX ORDER — ASPIRIN 325 MG
325 TABLET, DELAYED RELEASE (ENTERIC COATED) ORAL 2 TIMES DAILY WITH MEALS
Qty: 60 TABLET | Refills: 0 | Status: SHIPPED | OUTPATIENT
Start: 2022-03-17 | End: 2022-04-16

## 2022-03-17 RX ORDER — CEFAZOLIN SODIUM 2 G/50ML
SOLUTION INTRAVENOUS
Status: COMPLETED
Start: 2022-03-17 | End: 2022-03-17

## 2022-03-17 RX ORDER — SODIUM CHLORIDE 0.9 % (FLUSH) 0.9 %
5-40 SYRINGE (ML) INJECTION PRN
Status: DISCONTINUED | OUTPATIENT
Start: 2022-03-17 | End: 2022-03-17 | Stop reason: HOSPADM

## 2022-03-17 RX ORDER — HYDRALAZINE HYDROCHLORIDE 20 MG/ML
5 INJECTION INTRAMUSCULAR; INTRAVENOUS EVERY 5 MIN PRN
Status: DISCONTINUED | OUTPATIENT
Start: 2022-03-17 | End: 2022-03-18 | Stop reason: HOSPADM

## 2022-03-17 RX ORDER — HYDROMORPHONE HYDROCHLORIDE 2 MG/1
1 TABLET ORAL EVERY 4 HOURS PRN
Status: DISCONTINUED | OUTPATIENT
Start: 2022-03-17 | End: 2022-03-18 | Stop reason: HOSPADM

## 2022-03-17 RX ORDER — SODIUM CHLORIDE 9 MG/ML
INJECTION, SOLUTION INTRAVENOUS CONTINUOUS
Status: DISCONTINUED | OUTPATIENT
Start: 2022-03-17 | End: 2022-03-17

## 2022-03-17 RX ORDER — OXYCODONE HYDROCHLORIDE AND ACETAMINOPHEN 5; 325 MG/1; MG/1
2 TABLET ORAL ONCE
Status: COMPLETED | OUTPATIENT
Start: 2022-03-17 | End: 2022-03-17

## 2022-03-17 RX ORDER — SODIUM CHLORIDE 0.9 % (FLUSH) 0.9 %
5-40 SYRINGE (ML) INJECTION EVERY 12 HOURS SCHEDULED
Status: DISCONTINUED | OUTPATIENT
Start: 2022-03-17 | End: 2022-03-18 | Stop reason: HOSPADM

## 2022-03-17 RX ORDER — SODIUM CHLORIDE 9 MG/ML
25 INJECTION, SOLUTION INTRAVENOUS PRN
Status: DISCONTINUED | OUTPATIENT
Start: 2022-03-17 | End: 2022-03-17 | Stop reason: HOSPADM

## 2022-03-17 RX ORDER — LIDOCAINE HYDROCHLORIDE AND EPINEPHRINE 10; 10 MG/ML; UG/ML
3 INJECTION, SOLUTION INFILTRATION; PERINEURAL ONCE
Status: DISCONTINUED | OUTPATIENT
Start: 2022-03-17 | End: 2022-03-18 | Stop reason: HOSPADM

## 2022-03-17 RX ORDER — SCOLOPAMINE TRANSDERMAL SYSTEM 1 MG/1
1 PATCH, EXTENDED RELEASE TRANSDERMAL ONCE
Status: DISCONTINUED | OUTPATIENT
Start: 2022-03-17 | End: 2022-03-18 | Stop reason: HOSPADM

## 2022-03-17 RX ORDER — FENTANYL CITRATE 50 UG/ML
100 INJECTION, SOLUTION INTRAMUSCULAR; INTRAVENOUS ONCE
Status: COMPLETED | OUTPATIENT
Start: 2022-03-17 | End: 2022-03-17

## 2022-03-17 RX ORDER — DIPHENHYDRAMINE HYDROCHLORIDE 50 MG/ML
INJECTION INTRAMUSCULAR; INTRAVENOUS PRN
Status: DISCONTINUED | OUTPATIENT
Start: 2022-03-17 | End: 2022-03-17 | Stop reason: SDUPTHER

## 2022-03-17 RX ORDER — MIDAZOLAM HYDROCHLORIDE 1 MG/ML
INJECTION INTRAMUSCULAR; INTRAVENOUS
Status: COMPLETED
Start: 2022-03-17 | End: 2022-03-17

## 2022-03-17 RX ORDER — CEFAZOLIN SODIUM 2 G/50ML
2000 SOLUTION INTRAVENOUS EVERY 8 HOURS
Status: COMPLETED | OUTPATIENT
Start: 2022-03-17 | End: 2022-03-17

## 2022-03-17 RX ORDER — ONDANSETRON 2 MG/ML
4 INJECTION INTRAMUSCULAR; INTRAVENOUS
Status: DISCONTINUED | OUTPATIENT
Start: 2022-03-17 | End: 2022-03-17 | Stop reason: HOSPADM

## 2022-03-17 RX ORDER — SODIUM CHLORIDE, SODIUM LACTATE, POTASSIUM CHLORIDE, CALCIUM CHLORIDE 600; 310; 30; 20 MG/100ML; MG/100ML; MG/100ML; MG/100ML
INJECTION, SOLUTION INTRAVENOUS CONTINUOUS
Status: DISCONTINUED | OUTPATIENT
Start: 2022-03-17 | End: 2022-03-17

## 2022-03-17 RX ORDER — VANCOMYCIN HYDROCHLORIDE 1 G/20ML
INJECTION, POWDER, LYOPHILIZED, FOR SOLUTION INTRAVENOUS PRN
Status: DISCONTINUED | OUTPATIENT
Start: 2022-03-17 | End: 2022-03-17 | Stop reason: HOSPADM

## 2022-03-17 RX ORDER — MIDAZOLAM HYDROCHLORIDE 1 MG/ML
INJECTION INTRAMUSCULAR; INTRAVENOUS PRN
Status: DISCONTINUED | OUTPATIENT
Start: 2022-03-17 | End: 2022-03-17 | Stop reason: SDUPTHER

## 2022-03-17 RX ORDER — SODIUM CHLORIDE 9 MG/ML
INJECTION, SOLUTION INTRAVENOUS CONTINUOUS
Status: DISCONTINUED | OUTPATIENT
Start: 2022-03-17 | End: 2022-03-18 | Stop reason: HOSPADM

## 2022-03-17 RX ORDER — FENTANYL CITRATE 50 UG/ML
INJECTION, SOLUTION INTRAMUSCULAR; INTRAVENOUS
Status: COMPLETED
Start: 2022-03-17 | End: 2022-03-17

## 2022-03-17 RX ORDER — SODIUM CHLORIDE, SODIUM LACTATE, POTASSIUM CHLORIDE, CALCIUM CHLORIDE 600; 310; 30; 20 MG/100ML; MG/100ML; MG/100ML; MG/100ML
INJECTION, SOLUTION INTRAVENOUS CONTINUOUS PRN
Status: DISCONTINUED | OUTPATIENT
Start: 2022-03-17 | End: 2022-03-17 | Stop reason: SDUPTHER

## 2022-03-17 RX ORDER — BUPIVACAINE HYDROCHLORIDE 7.5 MG/ML
INJECTION, SOLUTION INTRASPINAL PRN
Status: DISCONTINUED | OUTPATIENT
Start: 2022-03-17 | End: 2022-03-17 | Stop reason: SDUPTHER

## 2022-03-17 RX ORDER — LIDOCAINE HYDROCHLORIDE ANHYDROUS AND DEXTROSE MONOHYDRATE .4; 5 G/100ML; G/100ML
3 INJECTION, SOLUTION INTRAVENOUS PRN
Status: DISPENSED | OUTPATIENT
Start: 2022-03-17 | End: 2022-03-17

## 2022-03-17 RX ORDER — ONDANSETRON 2 MG/ML
INJECTION INTRAMUSCULAR; INTRAVENOUS PRN
Status: DISCONTINUED | OUTPATIENT
Start: 2022-03-17 | End: 2022-03-17 | Stop reason: SDUPTHER

## 2022-03-17 RX ORDER — HYDRALAZINE HYDROCHLORIDE 20 MG/ML
INJECTION INTRAMUSCULAR; INTRAVENOUS
Status: COMPLETED
Start: 2022-03-17 | End: 2022-03-17

## 2022-03-17 RX ORDER — ROPIVACAINE HYDROCHLORIDE 5 MG/ML
INJECTION, SOLUTION EPIDURAL; INFILTRATION; PERINEURAL
Status: COMPLETED | OUTPATIENT
Start: 2022-03-17 | End: 2022-03-17

## 2022-03-17 RX ORDER — DEXAMETHASONE SODIUM PHOSPHATE 10 MG/ML
INJECTION, SOLUTION INTRAMUSCULAR; INTRAVENOUS PRN
Status: DISCONTINUED | OUTPATIENT
Start: 2022-03-17 | End: 2022-03-17 | Stop reason: SDUPTHER

## 2022-03-17 RX ORDER — SODIUM CHLORIDE 9 MG/ML
25 INJECTION, SOLUTION INTRAVENOUS PRN
Status: DISCONTINUED | OUTPATIENT
Start: 2022-03-17 | End: 2022-03-18 | Stop reason: HOSPADM

## 2022-03-17 RX ORDER — KETAMINE HYDROCHLORIDE 50 MG/ML
INJECTION, SOLUTION, CONCENTRATE INTRAMUSCULAR; INTRAVENOUS PRN
Status: DISCONTINUED | OUTPATIENT
Start: 2022-03-17 | End: 2022-03-17 | Stop reason: SDUPTHER

## 2022-03-17 RX ORDER — KETOROLAC TROMETHAMINE 30 MG/ML
30 INJECTION, SOLUTION INTRAMUSCULAR; INTRAVENOUS ONCE
Status: COMPLETED | OUTPATIENT
Start: 2022-03-17 | End: 2022-03-17

## 2022-03-17 RX ORDER — SCOLOPAMINE TRANSDERMAL SYSTEM 1 MG/1
1 PATCH, EXTENDED RELEASE TRANSDERMAL
Status: DISCONTINUED | OUTPATIENT
Start: 2022-03-17 | End: 2022-03-17 | Stop reason: SDUPTHER

## 2022-03-17 RX ORDER — ACETAMINOPHEN 325 MG/1
650 TABLET ORAL EVERY 6 HOURS
Status: DISCONTINUED | OUTPATIENT
Start: 2022-03-17 | End: 2022-03-18 | Stop reason: HOSPADM

## 2022-03-17 RX ORDER — KETOROLAC TROMETHAMINE 30 MG/ML
INJECTION, SOLUTION INTRAMUSCULAR; INTRAVENOUS
Status: COMPLETED
Start: 2022-03-17 | End: 2022-03-17

## 2022-03-17 RX ORDER — CEFAZOLIN SODIUM 2 G/50ML
2000 SOLUTION INTRAVENOUS
Status: COMPLETED | OUTPATIENT
Start: 2022-03-17 | End: 2022-03-17

## 2022-03-17 RX ADMIN — DIPHENHYDRAMINE HYDROCHLORIDE 50 MG: 50 INJECTION, SOLUTION INTRAMUSCULAR; INTRAVENOUS at 10:22

## 2022-03-17 RX ADMIN — HYDRALAZINE HYDROCHLORIDE 5 MG: 20 INJECTION INTRAMUSCULAR; INTRAVENOUS at 15:46

## 2022-03-17 RX ADMIN — FENTANYL CITRATE 25 MCG: 50 INJECTION, SOLUTION INTRAMUSCULAR; INTRAVENOUS at 10:29

## 2022-03-17 RX ADMIN — KETAMINE HYDROCHLORIDE 20 MG: 50 INJECTION INTRAMUSCULAR; INTRAVENOUS at 13:03

## 2022-03-17 RX ADMIN — KETOROLAC TROMETHAMINE 30 MG: 30 INJECTION, SOLUTION INTRAMUSCULAR; INTRAVENOUS at 14:29

## 2022-03-17 RX ADMIN — HYDROMORPHONE HYDROCHLORIDE 0.5 MG: 1 INJECTION, SOLUTION INTRAMUSCULAR; INTRAVENOUS; SUBCUTANEOUS at 14:10

## 2022-03-17 RX ADMIN — CEFAZOLIN SODIUM 2000 MG: 2 SOLUTION INTRAVENOUS at 23:15

## 2022-03-17 RX ADMIN — SODIUM CHLORIDE: 9 INJECTION, SOLUTION INTRAVENOUS at 22:10

## 2022-03-17 RX ADMIN — ROPIVACAINE HYDROCHLORIDE 30 ML: 5 INJECTION, SOLUTION EPIDURAL; INFILTRATION; PERINEURAL at 09:21

## 2022-03-17 RX ADMIN — MIDAZOLAM 2 MG: 1 INJECTION INTRAMUSCULAR; INTRAVENOUS at 10:22

## 2022-03-17 RX ADMIN — SODIUM CHLORIDE: 9 INJECTION, SOLUTION INTRAVENOUS at 07:56

## 2022-03-17 RX ADMIN — CEFAZOLIN SODIUM 2000 MG: 2 SOLUTION INTRAVENOUS at 14:11

## 2022-03-17 RX ADMIN — FENTANYL CITRATE 100 MCG: 50 INJECTION, SOLUTION INTRAMUSCULAR; INTRAVENOUS at 09:15

## 2022-03-17 RX ADMIN — CEFAZOLIN SODIUM 2000 MG: 2 SOLUTION INTRAVENOUS at 10:22

## 2022-03-17 RX ADMIN — Medication 0.5 MG: at 15:46

## 2022-03-17 RX ADMIN — SODIUM CHLORIDE: 9 INJECTION, SOLUTION INTRAVENOUS at 10:06

## 2022-03-17 RX ADMIN — FENTANYL CITRATE 25 MCG: 50 INJECTION, SOLUTION INTRAMUSCULAR; INTRAVENOUS at 12:45

## 2022-03-17 RX ADMIN — KETAMINE HYDROCHLORIDE 30 MG: 50 INJECTION INTRAMUSCULAR; INTRAVENOUS at 10:49

## 2022-03-17 RX ADMIN — HYDROMORPHONE HYDROCHLORIDE 0.5 MG: 1 INJECTION, SOLUTION INTRAMUSCULAR; INTRAVENOUS; SUBCUTANEOUS at 15:46

## 2022-03-17 RX ADMIN — FENTANYL CITRATE 25 MCG: 50 INJECTION, SOLUTION INTRAMUSCULAR; INTRAVENOUS at 12:47

## 2022-03-17 RX ADMIN — ONDANSETRON 4 MG: 2 INJECTION INTRAMUSCULAR; INTRAVENOUS at 12:46

## 2022-03-17 RX ADMIN — FENTANYL CITRATE 25 MCG: 50 INJECTION, SOLUTION INTRAMUSCULAR; INTRAVENOUS at 12:14

## 2022-03-17 RX ADMIN — MIDAZOLAM 1 MG: 1 INJECTION INTRAMUSCULAR; INTRAVENOUS at 09:16

## 2022-03-17 RX ADMIN — HYDROMORPHONE HYDROCHLORIDE 0.5 MG: 1 INJECTION, SOLUTION INTRAMUSCULAR; INTRAVENOUS; SUBCUTANEOUS at 14:29

## 2022-03-17 RX ADMIN — HYDROMORPHONE HYDROCHLORIDE 0.25 MG: 1 INJECTION, SOLUTION INTRAMUSCULAR; INTRAVENOUS; SUBCUTANEOUS at 15:04

## 2022-03-17 RX ADMIN — MIDAZOLAM 1 MG: 1 INJECTION INTRAMUSCULAR; INTRAVENOUS at 09:15

## 2022-03-17 RX ADMIN — HYDROMORPHONE HYDROCHLORIDE 0.25 MG: 1 INJECTION, SOLUTION INTRAMUSCULAR; INTRAVENOUS; SUBCUTANEOUS at 14:55

## 2022-03-17 RX ADMIN — DEXAMETHASONE SODIUM PHOSPHATE 10 MG: 10 INJECTION, SOLUTION INTRAMUSCULAR; INTRAVENOUS at 11:18

## 2022-03-17 RX ADMIN — ACETAMINOPHEN 650 MG: 325 TABLET ORAL at 22:04

## 2022-03-17 RX ADMIN — ASPIRIN 325 MG: 325 TABLET, COATED ORAL at 22:04

## 2022-03-17 RX ADMIN — PROPOFOL 70 MG: 10 INJECTION, EMULSION INTRAVENOUS at 10:36

## 2022-03-17 RX ADMIN — SODIUM CHLORIDE, POTASSIUM CHLORIDE, SODIUM LACTATE AND CALCIUM CHLORIDE: 600; 310; 30; 20 INJECTION, SOLUTION INTRAVENOUS at 11:08

## 2022-03-17 RX ADMIN — BUPIVACAINE HYDROCHLORIDE IN DEXTROSE 2 ML: 7.5 INJECTION, SOLUTION SUBARACHNOID at 10:29

## 2022-03-17 RX ADMIN — LIDOCAINE HYDROCHLORIDE 3 MG/MIN: 4 INJECTION, SOLUTION INTRAVENOUS at 10:33

## 2022-03-17 RX ADMIN — OXYCODONE AND ACETAMINOPHEN 2 TABLET: 5; 325 TABLET ORAL at 17:04

## 2022-03-17 RX ADMIN — PROPOFOL INJECTABLE EMULSION 50 MCG/KG/MIN: 10 INJECTION, EMULSION INTRAVENOUS at 10:33

## 2022-03-17 RX ADMIN — HYDROMORPHONE HYDROCHLORIDE 1 MG: 2 TABLET ORAL at 22:04

## 2022-03-17 RX ADMIN — SODIUM CHLORIDE, PRESERVATIVE FREE 5 ML: 5 INJECTION INTRAVENOUS at 22:10

## 2022-03-17 ASSESSMENT — PULMONARY FUNCTION TESTS
PIF_VALUE: 0
PIF_VALUE: 1
PIF_VALUE: 0
PIF_VALUE: 2
PIF_VALUE: 0
PIF_VALUE: 1
PIF_VALUE: 0
PIF_VALUE: 1
PIF_VALUE: 0
PIF_VALUE: 1
PIF_VALUE: 1
PIF_VALUE: 0
PIF_VALUE: 1
PIF_VALUE: 0
PIF_VALUE: 1
PIF_VALUE: 0

## 2022-03-17 ASSESSMENT — PAIN DESCRIPTION - ORIENTATION
ORIENTATION: LEFT
ORIENTATION: ANTERIOR
ORIENTATION: LEFT

## 2022-03-17 ASSESSMENT — PAIN SCALES - GENERAL
PAINLEVEL_OUTOF10: 7
PAINLEVEL_OUTOF10: 0
PAINLEVEL_OUTOF10: 6
PAINLEVEL_OUTOF10: 10
PAINLEVEL_OUTOF10: 0
PAINLEVEL_OUTOF10: 5
PAINLEVEL_OUTOF10: 8
PAINLEVEL_OUTOF10: 10
PAINLEVEL_OUTOF10: 9
PAINLEVEL_OUTOF10: 9
PAINLEVEL_OUTOF10: 8
PAINLEVEL_OUTOF10: 0
PAINLEVEL_OUTOF10: 5

## 2022-03-17 ASSESSMENT — PAIN DESCRIPTION - PAIN TYPE
TYPE: ACUTE PAIN;SURGICAL PAIN
TYPE: SURGICAL PAIN
TYPE: SURGICAL PAIN
TYPE: ACUTE PAIN;SURGICAL PAIN
TYPE: SURGICAL PAIN
TYPE: ACUTE PAIN;SURGICAL PAIN
TYPE: SURGICAL PAIN

## 2022-03-17 ASSESSMENT — PAIN DESCRIPTION - DESCRIPTORS
DESCRIPTORS: ACHING;DISCOMFORT
DESCRIPTORS: ACHING;PRESSURE
DESCRIPTORS: ACHING;DISCOMFORT
DESCRIPTORS: SORE
DESCRIPTORS: ACHING;DISCOMFORT
DESCRIPTORS: SORE
DESCRIPTORS: SORE

## 2022-03-17 ASSESSMENT — PAIN DESCRIPTION - LOCATION
LOCATION: KNEE
LOCATION: FOOT
LOCATION: KNEE

## 2022-03-17 ASSESSMENT — PAIN DESCRIPTION - FREQUENCY
FREQUENCY: CONTINUOUS

## 2022-03-17 ASSESSMENT — PAIN - FUNCTIONAL ASSESSMENT: PAIN_FUNCTIONAL_ASSESSMENT: 0-10

## 2022-03-17 ASSESSMENT — PAIN DESCRIPTION - PROGRESSION
CLINICAL_PROGRESSION: NOT CHANGED
CLINICAL_PROGRESSION: GRADUALLY IMPROVING
CLINICAL_PROGRESSION: NOT CHANGED
CLINICAL_PROGRESSION: NOT CHANGED
CLINICAL_PROGRESSION: GRADUALLY WORSENING

## 2022-03-17 NOTE — PROGRESS NOTES
3/18/22 1815 reviewed discharge instructions with pt and jose her .  Both verbalized understanding,s igned in agreement and given copy. kmo rn

## 2022-03-17 NOTE — OP NOTE
[address]                                OPERATIVE REPORT    PATIENT NAME: Devorah Medina                    :        1970  MED REC NO:   [id number]                            ROOM:  ACCOUNT NO:   [de-identified]                           ADMIT DATE: 2022  PROVIDER:     Balwinder Price    DATE OF PROCEDURE:  2022    PREOPERATIVE DIAGNOSIS:  Advanced arthritis, left knee. POSTOPERATIVE DIAGNOSIS:  Advanced arthritis, left knee. OPERATION:  Cemented left total knee replacement. SURGEON:  Balwinder Price MD    DESCRIPTION OF PROCEDURE:    OPERATIVE PROCEDURE:  The patient was brought to the operative theater,  where a spinal anesthetic was induced by the Department of  Anesthesiology. The left knee, leg, and foot were prepped and draped in  the usual sterile fashion. Tourniquet was proximal thigh and inflated. Anterior midline incision was placed over the front of the left knee. Medial pericapsular incision was performed. We encountered a large  25-30 mL yellowish effusion in the left knee joint today. Advanced  arthritis was seen in all compartments of the knee joint this morning. The kneecap was everted and a patellar osteotomy was performed with a  power saw. A size 32 mm trial of patellar button seemed to fit quite  nicely. The knee was then flexed up to 95 degrees. Appropriate  intramedullary jig was placed in the distal femur canal.  Appropriate  distal femur osteotomy cuts were performed with a power saw. A size 6  femoral component seemed to fit quite nicely. Appropriate  extramedullary jig was then applied to the left leg. Appropriate tibial  plateau osteotomy cuts were performed with the power saw. A size 4  trial tibial plate seemed to fit quite nicely with a size 7 plastic  insert tray.   The knee was then reduced and all three components seemed  to fit together quite well. She had full extension and full flexion as  well as good stability and good alignment of the knee joint. I removed  the trial components. I irrigated the knee joint thoroughly with 3  liters of antibiotic solution. I then dried the joint and then cemented  into place the actual components. The excess cement was trimmed away. She again had full extension and full flexion as well as good stability  and good alignment of the knee joint. I irrigated the wound, dried the  wound, and closed in layered fashion. EBL was 100 mL. A sterile bulky  dressing was applied to the left knee and tourniquet was released. The  patient tolerated today's operation quite well and she was transferred  to recovery room in satisfactory condition. I plan to x-ray the left  knee replacement in the recovery room later this morning.         Ge Hunter    D: 03/17/2022 12:39:51       T: 03/17/2022 12:43:28     [id number]  Job#: [id number]     Doc#: [id number]    CC:  Bozena Bullock DO

## 2022-03-17 NOTE — PROGRESS NOTES
[address] [facility]  [facility] [place]. [place]        Date:3/17/2022                                                  Patient Name: Lossie Cockayne    MRN: [id number]    : 1970    Room: OR POOL/NONE      Evaluating OT: Devon Maldonado OTR/L; [id number]     Referring Provider and Specific Provider Orders/Date:      22 1230  OT eval and treat  Start:  22 1230,   End:  22 1230,   ONE TIME,   Standing Count:  1 Occurrences,   R         [facility], DO      Placement Recommendation: HHOT        Diagnosis:   1. Chronic pain of left knee    2.  Status post total left knee replacement           Surgery: L TKA       Pertinent Medical History:       Past Medical History:   Diagnosis Date    Chronic back pain     Knee pain, bilateral     left knee pain         Past Surgical History:   Procedure Laterality Date    APPENDECTOMY  23years old     SECTION  age 19    KNEE ARTHROSCOPY Left 2021    ARTHROSCOPY LEFT KNEE AND  LEFT MENISECTOMY(91620) performed by [facility], DO at [address] Left       Precautions:  Fall Risk, full weight bearing: L LE d/t TKA     Assessment of current deficits:     [x] Functional mobility  [x]ADLs  [x] Strength               []Cognition    [x] Functional transfers   [x] IADLs         [] Safety Awareness   [x]Endurance    [] Fine Coordination              [x] Balance      [] Vision/perception   []Sensation     []Gross Motor Coordination  [x] ROM  [] Delirium                   [] Motor Control     OT PLAN OF CARE   OT POC based on physician orders, patient diagnosis and results of clinical assessment    Frequency/Duration 1-3 days/wk for 2 weeks PRN     Specific OT Treatment Interventions to include:   * Instruction/training on adapted ADL techniques and AE recommendations to increase functional independence within precautions * Training on energy conservation strategies, correct breathing pattern and techniques to improve independence/tolerance for self-care routine  * Functional transfer/mobility training/DME recommendations for increased independence, safety, and fall prevention  * Patient/Family education to increase follow through with safety techniques and functional independence  * Recommendation of environmental modifications for increased safety with functional transfers/mobility and ADLs  * Therapeutic exercise to improve motor endurance, ROM, and functional strength for ADLs/functional transfers  * Therapeutic activities to facilitate/challenge dynamic balance, stand tolerance for increased safety and independence with ADLs  * Positioning to improve skin integrity, interaction with environment and functional independence    Recommended Adaptive Equipment: none     Home Living: with spouse; single family home, 1 story, 1 step with no device, tub shower. Comment: pt states she can shower at her family members house who has a walk in shower    Equipment owned: cane, can borrow a bedside commode and shower chair if needed     Prior Level of Function: Independent with ADLs , Independent with IADLs; ambulated with no device     Pain Level: 8/10 pain in L knee, facilitated repositioning; Nursing notified.       Cognition: A&O: 4/4; Follows 3 step directions   Memory: good    Sequencing: good    Problem solving: good    Judgement/safety: good     [facility]   AM-PAC Daily Activity Inpatient   How much help for putting on and taking off regular lower body clothing?: A Little  How much help for Bathing?: A Little  How much help for Toileting?: A Little  How much help for putting on and taking off regular upper body clothing?: A Little  How much help for taking care of personal grooming?: A Little  How much help for eating meals?: None  AM-[facility] Inpatient Daily Activity Raw Score: 19  AM-PAC Inpatient ADL T-Scale Score : 40.22  ADL Inpatient CMS 0-100% Score: 42.8  ADL Inpatient CMS G-Code Modifier : CK     Functional Assessment:    Initial Eval Status  Date: 3/17/22 Treatment Status  Date: STGs = LTGs  Time frame: 10-14 days   Feeding Independent   Independent    Grooming Supervision  Independent    UB Dressing Supervision to [facility] gown and don tank top  Independent    LB Dressing Minimal assist to [facility] incontinent garment. Supervision to thread feet through underpants and pants while seated EOB then stood to bring garments up around hips and waist.   Independent    Bathing Minimal Assist  Independent    Toileting Supervision   Independent    Bed Mobility  Supine to sit: Supervision   Sit to supine: N/T    Supine to sit: Independent   Sit to supine: Independent    Functional Transfers Minimal Assist from EOB to wheeled walker x 2 reps. Supervision for transfer to and from wheelchair x 2 reps. CLINIC:  Supervision for transfer to and from bedside commode over toilet. Supervision for transfer to and from car set at low car setting. Instruction/training on seat position. Transfer training with verbal cues for hand placement throughout session to improve safety. Independent    Functional Mobility Minimal Assist progressing to supervision with wheeled walker to improve balance in room, to wheelchair in hallway and in clinic, verbal cues for walker sequence and safety. Modified Lee    Balance Sitting:     Static: good     Dynamic: fair   Standing: fair  with wheeled walker     Activity Tolerance fair   good    Visual/  Perceptual Glasses: yes                 Hand Dominance: right      AROM (PROM) Strength Additional Info:    RUE  WFL 5/5 good  and wfl FMC/dexterity noted during ADL tasks     LUE WFL 5/5 good  and wfl FMC/dexterity noted during ADL tasks       Hearing: WFL   Sensation:  No c/o numbness or tingling  Tone: WFL   Edema: yes, surgical extermity    Comments: Upon arrival the patient was supine.   At end of session, patient was left in care of PT in clinic to begin their evaluation. Spouse present. Overall patient demonstrated decreased independence and safety during completion of ADL/functional transfer/mobility tasks. Pt would benefit from continued skilled OT to increase safety and independence with completion of ADL/IADL tasks for functional independence and quality of life. Treatment: OT treatment provided this date includes:  ·  Instruction/training on safety and adapted techniques for completion of ADLs  ·  Instruction/training on safe functional mobility/transfer techniques  ·  Instruction/training on energy conservation/work simplification for completion of ADLs  ·  Proper Positioning/Alignment  ·  Instruction/training in weight bearing status and walker sequence  ·  Instruction/training in use of adaptive equipment for lower body dressing, demo provided   ·  Instruction/training in lower body dressing techniques   ·  Clinic completed     Rehab Potential: Good for established goals. Patient / Family Goal: return home       Patient and/or family were instructed on functional diagnosis, prognosis/goals and OT plan of care. Demonstrated good understanding. Eval Complexity: Low    Time In: 4:35pm  Time Out: 5:17pm    Total Treatment Time: 23      Min Units   OT Eval Low 97165  X  1    OT Eval Medium 18228      OT Eval High 98477      OT Re-Eval 75652            ADL/Self Care 81250  13  1    Therapeutic Activities 05235  10  1    Therapeutic Ex 67010       Orthotic Management 09663       Manual 60937     Neuro Re-Ed 32641       Non-Billable Time        Evaluation Time additionally includes thorough review of current medical information, gathering information on past medical history/social history and prior level of function, interpretation of standardized testing/informal observation of tasks, assessment of data and development of plan of care and goals.         Evaluating OT: Carla Kincaid OTR/L; [id number]

## 2022-03-17 NOTE — ANESTHESIA PROCEDURE NOTES
Peripheral Block    Patient location during procedure: PACU  Start time: 3/17/2022 9:17 AM  End time: 3/17/2022 9:20 AM  Staffing  Anesthesiologist: Sathya Gomez MD  Preanesthetic Checklist  Completed: patient identified, IV checked, site marked, risks and benefits discussed, surgical consent, monitors and equipment checked, pre-op evaluation, timeout performed, anesthesia consent given, oxygen available and patient being monitored  Peripheral Block  Patient position: supine  Prep: ChloraPrep  Patient monitoring: cardiac monitor, continuous pulse ox, frequent blood pressure checks and IV access  Block type: Femoral  Laterality: left  Injection technique: single-shot  Guidance: ultrasound guided  Adductor canal  Provider prep: mask and sterile gloves  Needle  Needle type: combined needle/nerve stimulator   Needle gauge: 21 G  Needle length: 8 cm  Needle localization: ultrasound guidance  Assessment  Injection assessment: negative aspiration for heme and local visualized surrounding nerve on ultrasound  Paresthesia pain: none  Slow fractionated injection: yes  Hemodynamics: stable  Medications Administered  Ropivacaine (NAROPIN) injection 0.5%, 30 mL  Reason for block: post-op pain management and at surgeon's request

## 2022-03-17 NOTE — PROGRESS NOTES
3/17/22 1651 dr Puneet Hebert  Notified per perfect serve that pt was ordered percocet 10mg here for pain per anesthesia but per discharge instructions pt is now going to be ordered norco 5mg for home going. Also notified pt had dilaudid 2mg in pacu and toradol 30mg. 18 Dr Lilly Chow here and did clarify ok to give percocet here and pt would start norco at home.  Then 1453 dr Puneet Hebert did perfect serve \" whatever we dc her on is ok\" 36 dr Puneet Hebert aware per perfect serve that dr Lilly Chow here and clarified to give her the percoet just for here. kmo rn

## 2022-03-17 NOTE — PROGRESS NOTES
3/18/22 1815 pt up to bathroom and verbalized 'did not get other shefali hose\" pt pulled up pant leg and is bleeding out of left side of adhesive bandage. Pressure applied to site and pt assisted x2 back to bed. Dr White Drivers aware and ordered to apply pressure and he would be here to see pt. Pt aware and verbalized understanding. Pressure applied to site and bleeding did stop. Will continue to assess.  dontae sebastian

## 2022-03-17 NOTE — PROGRESS NOTES
Dr. Socrates Church notified of the patient's pain level and BP. Okay to discharge to [facility]. Order placed for oral pain medication.

## 2022-03-17 NOTE — PROGRESS NOTES
3/18/22dr lombardi notified via perfect serve- pt dressing left knee removed. pt has 3 areas where blood was seeping around the staples. one area above the knee, one at the knee and one below the knee. area cleaned and new aquacel dressing applied. then ABDS and ace wrap applied. bleeding has slowed down but pt has not been up walking or moving the knee.  do you want her to stay overnight for observation or are you coming over soon to see her? awaiting response. kmo rn

## 2022-03-17 NOTE — ANESTHESIA POSTPROCEDURE EVALUATION
Department of Anesthesiology  Postprocedure Note    Patient: Milagros Nath  MRN: [id number]  YOB: 1970  Date of evaluation: 3/17/2022  Time:  1:43 PM     Procedure Summary     Date: 03/17/22 Room / Location: [address] [place] [address] 03 / [facility] [place]    Anesthesia Start: 1010 Anesthesia Stop: 0636    Procedure: LEFT TOTAL KNEE REPLACEMENT ([address]) (Left Knee) Diagnosis: (OSTEOARTHRITIS LEFT KNEE)    Surgeons: Lola Victor DO Responsible Provider: Shruthi Cleaning MD    Anesthesia Type: general, regional, spinal ASA Status: 3          Anesthesia Type: general, regional, spinal    Hammad Phase I: Ahmmad Score: 10    Hammad Phase II:      Last vitals: Reviewed and per EMR flowsheets.        Anesthesia Post Evaluation    Patient location during evaluation: PACU  Patient participation: complete - patient participated  Level of consciousness: awake  Pain score: 2  Airway patency: patent  Nausea & Vomiting: no nausea  Complications: no  Cardiovascular status: blood pressure returned to baseline  Respiratory status: acceptable  Hydration status: euvolemic

## 2022-03-17 NOTE — ANESTHESIA PROCEDURE NOTES
Spinal Block    Start time: 3/17/2022 10:25 AM  End time: 3/17/2022 10:29 AM  Reason for block: primary anesthetic  Staffing  Performed: other anesthesia staff   Anesthesiologist: Mckayla Tidwell MD  Resident/CRNA: Yumiko Amanda APRN - DARIANA  Other anesthesia staff: Cher Valdes RN  Preanesthetic Checklist  Completed: patient identified, IV checked, site marked, risks and benefits discussed, surgical consent, monitors and equipment checked, pre-op evaluation, timeout performed, anesthesia consent given, oxygen available and patient being monitored  Spinal Block  Patient position: sitting  Prep: Betadine and site prepped and draped  Patient monitoring: cardiac monitor, continuous pulse ox, continuous capnometry and frequent blood pressure checks  Approach: midline  Location: L3/L4  Provider prep: mask and sterile gloves  Local infiltration: lidocaine  Agent: bupivacaine (see mar)  Adjuvant: fentanyl (see mar)  Dose: 2  Dose: 2  Needle  Needle type: pencil-tip   Needle gauge: 25 G  Needle length: 3.5 in  Assessment  Sensory level: T10  Swirl obtained: Yes  CSF: clear  Attempts: 1  Hemodynamics: stable

## 2022-03-17 NOTE — PROGRESS NOTES
Physical Therapy  Physical Therapy Initial Evaluation/Plan of Care    Room #:  OR POOL/NONE  Patient Name: Carlos Cantu  YOB: 1970  MRN: [id number]    Date of Service: 3/17/2022     Tentative placement recommendation: Home Health Physical Therapy 5 days/week  Equipment recommendation: Patient has needed equipment       Evaluating Physical Therapist: Evan Long PT, DPT #[id number]      Specific Provider Orders/Date/Referring Provider :     03/17/22 1230   PT eval and treat Start: 03/17/22 1230, End: 03/17/22 1230, ONE TIME, Standing Count: 1 Occurrences, R    Gonzalo Yates,  Acknowledge New    Admitting Diagnosis:   OSTEOARTHRITIS LEFT KNEE      Surgery: L TKA 3/17/22  Visit Diagnoses       Codes    Status post total left knee replacement     Z96.652          Patient Active Problem List   Diagnosis    Knee pain, bilateral    Recurrent cold sores    Maxillary sinusitis    Midline low back pain without sciatica    Tobacco abuse counseling    Chronic pain of left knee    Numbness and tingling of right leg    Impingement syndrome of left shoulder        ASSESSMENT of Current Deficits Patient exhibits decreased strength, balance and endurance impairing functional mobility, transfers, gait , gait distance and tolerance to activity. Pt performed all function slowly but was generally steady with no LOB, dizziness, or SOB. Pt ambulated with slow shuffling gait with VC for knee flexion in heel off stage and knee extension in stance phase of gait. Pt required instruction for proper sequencing for stair negotiation as well as hand placement on the HR. Pt shown seated exercises sitting EOB with VC for technique.        PHYSICAL THERAPY  PLAN OF CARE       Physical therapy plan of care is established based on physician order,  patient diagnosis and clinical assessment    Current Treatment Recommendations:    -Bed Mobility: Lower extremity exercises  and Trunk control activities   -Sitting Balance: Incorporate reaching activities to activate trunk muscles , Hands on support to maintain midline , Facilitate active trunk muscle engagement , Facilitate postural control in all planes  and Engage in core activities to allow for movement within base of support   -Standing Balance: Perform strengthening exercises in standing to promote motor control with or without upper extremity support , Instruct patient on adequate base of support to maintain balance and Challenge balance utilizing reaching  activities beyond center of gravity    -Transfers: Provide instruction on proper hand and foot position for adequate transfer of weight onto lower extremities and use of gait device if needed, Cues for hand placement, technique and safety. Provide stabilization to prevent fall , Facilitate weight shift forward on to lower extremities and provide necessary stabilization of bilateral lower extremities , Support transfer of weight on to lower extremities and Assist with extension of knees trunk and hip to accept weight transfer   -Gait: Gait training, Standing activities to improve: base of support, weight shift, weight bearing , Exercises to improve trunk control, Exercises to improve hip and knee control, Performance of protected weight bearing activities and Activities to increase weight bearing   -Endurance: Utilize Supervised activities to increase level of endurance to allow for safe functional mobility including transfers and gait  and Use graduated activities to promote good breathing techniques and provide support and education to maximize respiratory function  -Stairs: Stair training with instruction on proper technique and hand placement on rail    PT long term treatment goals are located in below grid    Patient and or family understand(s) diagnosis, prognosis, and plan of care. Frequency of treatments: Patient will be seen  twice daily.          Prior Level of Function: Patient ambulated independently and with cane   Rehab Potential: good  for baseline    Past medical history:   Past Medical History:   Diagnosis Date    Chronic back pain     Knee pain, bilateral     left knee pain     Past Surgical History:   Procedure Laterality Date    APPENDECTOMY  23years old     SECTION  age 19    KNEE ARTHROSCOPY Left 2021    ARTHROSCOPY LEFT KNEE AND  LEFT MENISECTOMY(96025) performed by Ted Melton DO at [address]        SUBJECTIVE:    Precautions: Up with assistance, falls and WBAT weight bearing LLE   Social history: Patient lives with spouse in a ranch home  with No steps  to enter without Rail  Walk in [facility] owned: [employer],     [address]   How much difficulty turning over in bed?: None  How much difficulty sitting down on / standing up from a chair with arms?: A Little  How much difficulty moving from lying on back to sitting on side of bed?: A Little  How much help from another person moving to and from a bed to a chair?: A Little  How much help from another person needed to walk in hospital room?: A Little  How much help from another person for climbing 3-5 steps with a railing?: A Little  AM-PAC Inpatient Mobility Raw Score : 19  AM-PAC Inpatient T-Scale Score : 45.44  Mobility Inpatient CMS 0-100% Score: 41.77  Mobility Inpatient CMS G-Code Modifier : CK    Nursing cleared patient for PT evaluation. The admitting diagnosis and active problem list as listed above have been reviewed prior to the initiation of this evaluation. OBJECTIVE;   Initial Evaluation  Date: 3/17/2022 Treatment Date:     Short Term/ Long Term   Goals   Was pt agreeable to Eval/treatment?  Yes  To be met in 3 days   Pain level   10/10  L knee     Bed Mobility    Rolling: Independent    Supine to sit: Supervision     Sit to supine: Supervision     Scooting: Supervision     Rolling: Independent    Supine to sit: Independent    Sit to supine: Independent    Scooting: Independent     Transfers Sit to stand: Supervision    Sit to stand: Independent    Ambulation    150 feet using  wheeled walker with Supervision    for walker control, walker approximation, balance and safety   > 250 feet using  wheeled walker with Supervision     Stair negotiation: ascended and descended   4 steps with 1 HR with Eduardo    4 steps with 1 HR with with SBA   ROM Within functional limits    Increase range of motion 10% of affected joints    Strength BUE:  refer to OT eval  RLE:  4/5  LLE:  3-/5  Increase strength in affected mm groups by 1/3 grade   Balance Sitting EOB:  good -  Dynamic Standing:  fair +  Sitting EOB:  good   Dynamic Standing: good      Patient is Alert & Oriented x person, place, time and situation and follows directions    Sensation:  Patient  denies numbness/tingling   Edema:  no   Endurance: fair  +    Vitals: room air   Blood Pressure at rest  Blood Pressure during session    Heart Rate at rest  Heart Rate during session    SPO2 at rest %  SPO2 during session %     Patient education  Patient educated on role of Physical Therapy, risks of immobility, safety and plan of care, energy conservation,  importance of mobility while in hospital , ankle pumps, quad set and glut set for edema control, blood clot prevention, importance and purpose of adaptive device and adjusted to proper height for the patient. , safety , stair training  and proper use/technique of incentive spirometer     Patient response to education:   Pt verbalized understanding Pt demonstrated skill Pt requires further education in this area   Yes Partial Yes      Treatment:  Patient practiced and was instructed/facilitated in the following treatment: Patient  Sat edge of bed 15 minutes with Supervision  to increase dynamic sitting balance and activity tolerance. Pt performed bed mobility, transfers, ambulation, stairs, seated exercises sitting EOB.       Therapeutic Exercises:  ankle pumps, heel raises, quad sets and long arc quad  x 10 reps x 1 set and knee flex stretch and knee ext stretch sitting EOB 5 reps x 1 set with 15 seconds holds. At end of session, patient in bed with spouse present call light and phone within reach,  all lines and tubes intact, nursing notified. Patient would benefit from continued skilled Physical Therapy to improve functional independence and quality of life. Patient's/ family goals   home    Time in  1715  Time out  1802    Total Treatment Time  27 minutes    Evaluation time includes thorough review of current medical information, gathering information on past medical history/social history and prior level of function, completion of standardized testing/informal observation of tasks, assessment of data, and development of Plan of care and goals.      CPT codes:  Low Complexity PT evaluation (79747)  Therapeutic activities (84511)   15 minutes  1 unit(s)  Therapeutic exercises (57301)   12 minutes  1 unit(s)    Moise Garzon, PT

## 2022-03-17 NOTE — H&P
0.25 mg, 0.25 mg, IntraVENous, Q5 Min PRN  HYDROmorphone (DILAUDID) injection 0.5 mg, 0.5 mg, IntraVENous, Q5 Min PRN  ondansetron (ZOFRAN) injection 4 mg, 4 mg, IntraVENous, Once PRN  Allergies:  Patient has no known allergies. Social History:   TOBACCO:   reports that she has been smoking cigarettes. She has a 15.00 pack-year smoking history. She has never used smokeless tobacco.  ETOH:   reports current alcohol use of about 4.0 standard drinks of alcohol per week. DRUGS:   reports previous drug use. ACTIVITIES OF DAILY LIVING:    OCCUPATION:    Family History:       Problem Relation Age of Onset    No Known Problems Father        REVIEW OF SYSTEMS:  CONSTITUTIONAL:  negative for  fevers, chills  EYES:  negative for blurred vision, visual disturbance  HEENT:  negative for  hearing loss, voice change  RESPIRATORY:  negative for  dyspnea, wheezing  CARDIOVASCULAR:  negative for  chest pain, palpitations  GASTROINTESTINAL:  negative for nausea, vomiting  GENITOURINARY:  negative for frequency, urinary incontinence  HEMATOLOGIC/LYMPHATIC:  negative for bleeding and petechiae  MUSCULOSKELETAL:  positive for left knee pain  NEUROLOGICAL:  negative for headaches, dizziness  BEHAVIOR/PSYCH:  negative for increased agitation and anxiety    PHYSICAL EXAM:    VITALS:  /62   Pulse 72   Temp 98 °F (36.7 °C) (Infrared)   Resp 16   SpO2 96%   CONSTITUTIONAL:  Awake, alert, answers questions appropriately  EYES:  Lids and lashes normal, pupils equal, round and reactive to light, extra ocular muscles intact  HENT:  Normocephalic, without obvious abnormality, atraumatic, neck supple, symmetric  LUNGS:  No increased work of breathing  CARDIOVASCULAR:  Brisk vascular capillary refill to all extremities  ABDOMEN:  Non-tender, Non-distended  NEUROLOGIC:  Awake, alert, oriented to name, place and time. Cranial nerves II-XII are grossly intact.   MUSCULOSKELETAL:  Left lower Extremity:   Skin is intact  Compartments soft and compressible  +PF/DF/EHL  +2/4 DP & PT pulses, Brisk Cap refill, Toes warm and perfused  Distal sensation grossly intact to Peroneals, Sural, Saphenous, and tibial nrs    DATA:    CBC:   Lab Results   Component Value Date    WBC 12.3 03/14/2022    RBC 4.22 03/14/2022    HGB 13.6 03/14/2022    HCT 40.8 03/14/2022    MCV 96.7 03/14/2022    MCH 32.2 03/14/2022    MCHC 33.3 03/14/2022    RDW 12.2 03/14/2022     03/14/2022    MPV 9.6 03/14/2022     PT/INR:    Lab Results   Component Value Date    PROTIME 12.0 03/14/2022    INR 1.0 03/14/2022         IMPRESSION:  · Left knee osteoarthritis    PLAN:  · NPO now  · Treatment consent  · Plan for left total knee arthroplasty  · Discussed with Dr. Jyoti Hill

## 2022-03-18 VITALS
TEMPERATURE: 97.6 F | DIASTOLIC BLOOD PRESSURE: 86 MMHG | OXYGEN SATURATION: 99 % | SYSTOLIC BLOOD PRESSURE: 149 MMHG | RESPIRATION RATE: 20 BRPM | HEART RATE: 67 BPM

## 2022-03-18 PROBLEM — Z96.642 S/P TOTAL LEFT HIP ARTHROPLASTY: Status: ACTIVE | Noted: 2022-03-18

## 2022-03-18 LAB
ALBUMIN SERPL-MCNC: 3.4 G/DL (ref 3.5–5.2)
ALP BLD-CCNC: 69 U/L (ref 35–104)
ALT SERPL-CCNC: 23 U/L (ref 0–32)
ANION GAP SERPL CALCULATED.3IONS-SCNC: 11 MMOL/L (ref 7–16)
AST SERPL-CCNC: 22 U/L (ref 0–31)
BILIRUB SERPL-MCNC: 0.3 MG/DL (ref 0–1.2)
BUN BLDV-MCNC: 7 MG/DL (ref 6–20)
CALCIUM SERPL-MCNC: 8.1 MG/DL (ref 8.6–10.2)
CHLORIDE BLD-SCNC: 103 MMOL/L (ref 98–107)
CO2: 21 MMOL/L (ref 22–29)
CREAT SERPL-MCNC: 0.6 MG/DL (ref 0.5–1)
GFR AFRICAN AMERICAN: >60
GFR NON-AFRICAN AMERICAN: >60 ML/MIN/1.73
GLUCOSE BLD-MCNC: 134 MG/DL (ref 74–99)
HCT VFR BLD CALC: 31.3 % (ref 34–48)
HEMOGLOBIN: 10.4 G/DL (ref 11.5–15.5)
MCH RBC QN AUTO: 32.4 PG (ref 26–35)
MCHC RBC AUTO-ENTMCNC: 33.2 % (ref 32–34.5)
MCV RBC AUTO: 97.5 FL (ref 80–99.9)
PDW BLD-RTO: 12.3 FL (ref 11.5–15)
PLATELET # BLD: 238 E9/L (ref 130–450)
PMV BLD AUTO: 9.4 FL (ref 7–12)
POTASSIUM SERPL-SCNC: 3.9 MMOL/L (ref 3.5–5)
RBC # BLD: 3.21 E12/L (ref 3.5–5.5)
SODIUM BLD-SCNC: 135 MMOL/L (ref 132–146)
TOTAL PROTEIN: 6 G/DL (ref 6.4–8.3)
WBC # BLD: 11.9 E9/L (ref 4.5–11.5)

## 2022-03-18 PROCEDURE — 6370000000 HC RX 637 (ALT 250 FOR IP): Performed by: ORTHOPAEDIC SURGERY

## 2022-03-18 PROCEDURE — 94150 VITAL CAPACITY TEST: CPT

## 2022-03-18 PROCEDURE — 36415 COLL VENOUS BLD VENIPUNCTURE: CPT

## 2022-03-18 PROCEDURE — G0378 HOSPITAL OBSERVATION PER HR: HCPCS

## 2022-03-18 PROCEDURE — 2580000003 HC RX 258: Performed by: ORTHOPAEDIC SURGERY

## 2022-03-18 PROCEDURE — 97110 THERAPEUTIC EXERCISES: CPT

## 2022-03-18 PROCEDURE — 97530 THERAPEUTIC ACTIVITIES: CPT

## 2022-03-18 PROCEDURE — 85027 COMPLETE CBC AUTOMATED: CPT

## 2022-03-18 PROCEDURE — 80053 COMPREHEN METABOLIC PANEL: CPT

## 2022-03-18 RX ADMIN — ACETAMINOPHEN 650 MG: 325 TABLET ORAL at 09:24

## 2022-03-18 RX ADMIN — HYDROMORPHONE HYDROCHLORIDE 1 MG: 2 TABLET ORAL at 02:07

## 2022-03-18 RX ADMIN — ACETAMINOPHEN 650 MG: 325 TABLET ORAL at 02:11

## 2022-03-18 RX ADMIN — HYDROMORPHONE HYDROCHLORIDE 1 MG: 2 TABLET ORAL at 06:05

## 2022-03-18 RX ADMIN — ASPIRIN 325 MG: 325 TABLET, COATED ORAL at 08:08

## 2022-03-18 RX ADMIN — SODIUM CHLORIDE: 9 INJECTION, SOLUTION INTRAVENOUS at 06:57

## 2022-03-18 ASSESSMENT — PAIN DESCRIPTION - FREQUENCY: FREQUENCY: CONTINUOUS

## 2022-03-18 ASSESSMENT — PAIN SCALES - GENERAL
PAINLEVEL_OUTOF10: 8
PAINLEVEL_OUTOF10: 5
PAINLEVEL_OUTOF10: 6
PAINLEVEL_OUTOF10: 5
PAINLEVEL_OUTOF10: 8

## 2022-03-18 ASSESSMENT — PAIN DESCRIPTION - LOCATION: LOCATION: KNEE

## 2022-03-18 ASSESSMENT — PAIN DESCRIPTION - DESCRIPTORS: DESCRIPTORS: JABBING

## 2022-03-18 ASSESSMENT — PAIN DESCRIPTION - PAIN TYPE: TYPE: SURGICAL PAIN

## 2022-03-18 NOTE — PROGRESS NOTES
3/18/22 2105 nurse to nurse called to corazon on the third floor. Corazon also aware that pts  jose took pt prescripiton for asa and norco to have them filled.  Aware that dr Bowers Odor to come over tonight to see pt. kmo rn

## 2022-03-18 NOTE — PROGRESS NOTES
muscles , Hands on support to maintain midline , Facilitate active trunk muscle engagement , Facilitate postural control in all planes  and Engage in core activities to allow for movement within base of support   -Standing Balance: Perform strengthening exercises in standing to promote motor control with or without upper extremity support , Instruct patient on adequate base of support to maintain balance and Challenge balance utilizing reaching  activities beyond center of gravity    -Transfers: Provide instruction on proper hand and foot position for adequate transfer of weight onto lower extremities and use of gait device if needed, Cues for hand placement, technique and safety. Provide stabilization to prevent fall , Facilitate weight shift forward on to lower extremities and provide necessary stabilization of bilateral lower extremities , Support transfer of weight on to lower extremities and Assist with extension of knees trunk and hip to accept weight transfer   -Gait: Gait training, Standing activities to improve: base of support, weight shift, weight bearing , Exercises to improve trunk control, Exercises to improve hip and knee control, Performance of protected weight bearing activities and Activities to increase weight bearing   -Endurance: Utilize Supervised activities to increase level of endurance to allow for safe functional mobility including transfers and gait  and Use graduated activities to promote good breathing techniques and provide support and education to maximize respiratory function  -Stairs: Stair training with instruction on proper technique and hand placement on rail    PT long term treatment goals are located in below grid    Patient and or family understand(s) diagnosis, prognosis, and plan of care. Frequency of treatments: Patient will be seen  twice daily.          Prior Level of Function: Patient ambulated independently and with cane   Rehab Potential: good  for baseline    Past medical history:   Past Medical History:   Diagnosis Date    Chronic back pain     Class 1 obesity due to excess calories with body mass index (BMI) of 33.0 to 33.9 in adult     Knee pain, bilateral     left knee pain     Past Surgical History:   Procedure Laterality Date    APPENDECTOMY  23years old     SECTION  age 19    KNEE ARTHROSCOPY Left 2021    ARTHROSCOPY LEFT KNEE AND  LEFT MENISECTOMY(13072) performed by Viji Alonso,  at [address]        SUBJECTIVE:    Precautions: Up with assistance, falls and WBAT weight bearing LLE   Social history: Patient lives with spouse in a ranch home  with No steps  to enter without Rail  Walk in [facility] owned: [employer],     [address] [place] [address] [address]kw[address]   How much difficulty turning over in bed?: None  How much difficulty sitting down on / standing up from a chair with arms?: A Little  How much difficulty moving from lying on back to sitting on side of bed?: None  How much help from another person moving to and from a bed to a chair?: A Little  How much help from another person needed to walk in hospital room?: A Little  How much help from another person for climbing 3-5 steps with a railing?: A Little  AM-PAC Inpatient Mobility Raw Score : 20  AM-PAC Inpatient T-Scale Score : 47.67  Mobility Inpatient CMS 0-100% Score: 35.83  Mobility Inpatient CMS G-Code Modifier : [address] cleared patient for PT treatment. .   OBJECTIVE;   Initial Evaluation  Date: 3/17/2022 Treatment Date:  3/18/2022       Short Term/ Long Term   Goals   Was pt agreeable to Eval/treatment?  Yes yes To be met in 3 days   Pain level   10/10  L knee 8/10  Left knee    Bed Mobility    Rolling: Independent    Supine to sit: Supervision     Sit to supine: Supervision     Scooting: Supervision    Rolling: Independent   Supine to sit: Supervision    Sit to supine: Supervision    Scooting: Supervision     Rolling: Independent    Supine to sit: Independent    Sit to supine: Independent    Scooting: Independent     Transfers Sit to stand: Supervision   Sit to stand: Supervision  Cues for hand placement and safety       Sit to stand: Independent    Ambulation    150 feet using  wheeled walker with Supervision    for walker control, walker approximation, balance and safety 2 x 60 feet using  wheeled walker with Supervision    cues for sequencing, FWB (full weight bearing) weight bearing left lower extremity, upright posture, walker approximation, increased base of support, increased step length, left knee extension in stance phase of gait and safety    > 250 feet using  wheeled walker with Supervision     Stair negotiation: ascended and descended   4 steps with 1 HR with Eduardo 14 steps using 2 rails with Supervision. Cues for sequencing and safety.    4 steps with 1 HR with with SBA   ROM Within functional limits    Increase range of motion 10% of affected joints    Strength BUE:  refer to OT taqueria  RLE:  4/5  LLE:  3-/5  Increase strength in affected mm groups by 1/3 grade   Balance Sitting EOB:  good -  Dynamic Standing:  fair + Sitting EOB: good   Dynamic Standing: fair + wheeled walker   Sitting EOB:  good   Dynamic Standing: good      Patient is Alert & Oriented x person, place, time and situation and follows directions    Sensation:  Patient  denies numbness/tingling   Edema:  no   Endurance: fair  +    Vitals: room air   Blood Pressure at rest  Blood Pressure during session    Heart Rate at rest  Heart Rate during session    SPO2 at rest %  SPO2 during session %     Patient education  Patient educated on role of Physical Therapy, risks of immobility, safety and plan of care, energy conservation,  importance of mobility while in hospital , ankle pumps, quad set and glut set for edema control, blood clot prevention, importance and purpose of adaptive device and adjusted to proper height for the patient. , safety , stair training  and proper use/technique of incentive spirometer     Patient response to education:   Pt verbalized understanding Pt demonstrated skill Pt requires further education in this area   Yes Partial Yes      Treatment:  Patient practiced and was instructed/facilitated in the following treatment: Patient performed supine exercises. Pt instructed on using a sheet for bed mobility, to assist with exercises flexion and extension, proper hand placement and increased knee flexion with each sit to stand transfer and to elevate her L leg higher than her heart while sitting more than 1 hour or more and in bed to decrease swelling and to use a sheet for dorsiflexion to increase her knee extension when elevated. Pt to the edge of the bed. Sat edge of bed 10 minutes with Supervision  to increase dynamic sitting balance and activity tolerance. Pt stood, ambulated in the hallway, performed stair training, to the restroom, and back to the bed. Therapeutic Exercises:  ankle pumps, quad sets, glut sets, heel slide, hip abduction/adduction and straight leg raise,  x 15 - 20 reps. At end of session, patient in bed with spouse present call light and phone within reach,  all lines and tubes intact, nursing notified. Patient would benefit from continued skilled Physical Therapy to improve functional independence and quality of life. Patient's/ family goals   home    Time in 08:35  Time out 09:15    Total Treatment Time  40 minutes        CPT codes:    Therapeutic activities (64057)   28 minutes  2 unit(s)  Therapeutic exercises (47987)   12 minutes  1 unit(s)   Mau Luong  [facility]  LIC # [id number]

## 2022-03-18 NOTE — CONSULTS
[address]                                  CONSULTATION    PATIENT NAME: Leopoldo Cardoza                    :        1970  MED REC NO:   [id number]                            ROOM:       [id number]  ACCOUNT NO:   [de-identified]                           ADMIT DATE: 2022  PROVIDER:     Erica Anthony DO    CONSULT DATE:  2022    CHIEF COMPLAINT AND HISTORY OF CHIEF COMPLAINT:  This is a pleasant  66-year-old white female who was admitted to [address] [place] [address]. The  patient presented to the hospital here under the service of Dr. Christopher Guajardo. The patient was taken to surgery on , at which time, a  total left knee arthroplasty was performed. The patient does have a  history of longstanding degenerative arthritis of left knee. She was  having progressive difficulty ambulating, at which time, she was taken  to surgery, under spinal anesthesia, underwent a left total knee  replacement by Dr. Christopher Guajardo on . The patient did well  postoperatively, but did develop some postoperative bleeding at that  time. She was kept overnight. Consultation was obtained by myself on  . Exam at this time revealed pleasant 66-year-old white female who  at this time was alert, responsive, otherwise resting comfortably. From  a cardiac standpoint view, she currently had no chest pain. She denies  any resting or exertional dyspnea, orthopnea, paroxysmal nocturnal  dyspnea, intermittent claudication or pretibial edema. Respiratory  wise, the patient does smoke. She does have a slight cough. She denies  any significant sputum production. Gastrointestinal wise, she denies  any nausea, vomiting, diarrhea, or constipation. Genitourinary wise,  she denies any dysuria, hematuria, or pyuria.   Neurologically, she  denies any history of stroke, TIAs, etc.  Review of systems otherwise  with some residual numbness and tingling of left leg and some pain and  discomfort in the left knee appeared to be unremarkable. ALLERGIES:  No known drug allergies. CURRENT MEDICATIONS:  Prior to admission include Prilosec 20 mg daily,  aspirin 325 daily, Valtrex 1 gm daily p.r.n. PAST MEDICAL HISTORY:  Positive for the usual childhood diseases. She  does have a history of chronic low back pain, obesity, and bilateral  knee pain. PAST SURGICAL HISTORY:  Include appendectomy,  section, left  total knee arthroscopic exam on 2021, left total knee replacement  on 2022, and left knee surgery in 2016. FAMILY HISTORY:  Parents both are alive. SOCIAL HISTORY:  The patient smokes half pack of cigarettes per day for  numerous years. Social use of alcohol. Denies any recreational drugs. Currently . Mother of two children. REVIEW OF THE CHART:  Hemoglobin and hematocrit are 10.4 and 31.3  respectively. White count 11,000. Platelet count are adequate. BUN  and creatinine were 7 and 0.6 respectively. Electrolytes are stable. X-ray of left knee shows satisfactory surgery. PHYSICAL EXAMINATION:  VITAL SIGNS:  Show height to be 5 feet 3 inches, weight 188 pounds, BMI  was 33.30, blood pressure 149/88, pulse 80, respirations 18. GENERAL APPEARANCE:  At this time reveals a 47-year-old white female who  is alert, responsive, oriented to person, place, and time. HEENT:  Normal grossly to visual inspection. NECK:  Revealed adequate carotid upstrokes without associated bruits. Trachea midline. Thyroid not palpable. LUNGS:  Clear. HEART:  Fairly regular without ectopy. ABDOMEN:  Soft. No guarding, rebound, or rigidity. EXTREMITIES:  Currently without any cyanosis, clubbing, or edema. Left  leg did have a surgical dressing in place. Ace wrap is also noted. Some slight numbness of the toes are present. NEUROLOGIC:  She is moving all extremities. BREASTS:  Not performed.   RECTAL:  Not performed. GENITAL:  Not performed. SKIN:  Revealed multiple tattoos. IMPRESSION:  At this time include:  1. Status post total left knee replacement for advanced degenerative  arthritis. 2.  Obesity with elevated BMI of 33.3. 3.  Postoperative hemorrhage without need for transfusion. 4.  Delayed effect of anesthesia that was spinal.    PLAN AND RECOMMENDATIONS:  At this time, currently the patient does  appear to be stable. The patient is doing well. The numbness and  tingling seemed to be resolving, as she is working with Physical  Therapy. Blood work has been ordered, and the hemoglobin and hematocrit  appeared to be stable. She will be reevaluated by Orthopedic with  possible discharge today.         Christen Frey DO    D: 03/18/2022 13:48:24       T: 03/18/2022 17:26:29     [email or web address]  Job#: [id number]     Doc#: [id number]    CC:

## 2022-03-18 NOTE — PROGRESS NOTES
Department of Orthopedic Surgery  Resident Progress Note      SUBJECTIVE  Pt seen and examined. Pain around the operative knee today. Would like to be discharged to day. Otherwise No new complaints. OBJECTIVE    Physical    VITALS:  BP (!) 149/86   Pulse 67   Temp 97.6 °F (36.4 °C) (Oral)   Resp 20   SpO2 99%     left LE:   · Ace bandage and dressing were taken down to assess aquocel dressing. Dressing displayed some proximal bloody drainage however well contained with no seepage. Dressing C/D/I  · Distal sensory intact  · +2/4 PT and DP  · +PF/DF/EHL  · Compartments soft and compressible    Data    CBC:   Lab Results   Component Value Date    WBC 12.3 03/14/2022    RBC 4.22 03/14/2022    HGB 13.6 03/14/2022    HCT 40.8 03/14/2022    MCV 96.7 03/14/2022    MCH 32.2 03/14/2022    MCHC 33.3 03/14/2022    RDW 12.2 03/14/2022     03/14/2022    MPV 9.6 03/14/2022     PT/INR:    Lab Results   Component Value Date    PROTIME 12.0 03/14/2022    INR 1.0 03/14/2022         ASSESSMENT   · POD 1 s/p L TKA    PLAN    · Weight bearing as tolerated LLE  · Reinforce drainage with additional 4x4s and ABDs as needed  · Continue to elevated affected extremity with ice application. · 24 hour abx coverage  · Deep venous thrombosis prophylaxis - , early mobilization  · PT/OT: Ok keep ace bandage on for therapy. · Pain Control: IV and PO  · Monitor H&H  · D/C pending physical therapy performance.    ;

## 2022-03-18 NOTE — DISCHARGE SUMMARY
Physician Discharge Summary     Patient ID:  Veronika Stephens  [id number]  10 y.o.  1970    Admit date: 3/17/2022    Discharge date and time: No discharge date for patient encounter. Admitting Physician: Anne Enriquez DO     Discharge Physician: Anne Enriquez DO    Admission Diagnoses: S/P total left hip arthroplasty [Z96.642]    Discharge Diagnoses: s/p left total Hip arthroplasty    Admission Condition: stable    Discharged Condition: stable    Hospital Course: The patient was admitted from the pre-operative holding area and underwent an uneventful course of left Hip arthroplasty on 3/17/2022 by Anne Enriquez DO. The patient was subsequently taken to the PACU and to the floor in stable condition. The patient continued antibiotics 24 hours postoperatively, as they received a dose of antibiotics preoperatively for infection prophylaxis. The patient was to begin physical therapy, WBAT, the day of surgery. The patient was also started on DVT prophylaxis. Blood counts were followed daily. On post-op day 1, the dressing was changed, revealing the wound to be benign in appearance without obvious signs of infection including erythema or purulence. The fiore was discontinued. Pain medications were transitioned to oral medication.  was consulted for D/C planning as the patient made appropriate gains with PT in regaining independent function. On POD 1, the patient was discharged to home in stable condition. Treatments: s/p left total Hip arthroplasty    Disposition: The patient was provided instructions to follow up with Anne Enriquez DO in 2 weeks and to call the office for an appointment. staples (s) were to be removed in 2 weeks. Pt was to continue DVT prophylaxis as directed. Patient was also instructed they may shower on POD 4, and to continue daily dry sterile dressing changes until wound was dry.       Discharge Medications:     Medication List      START taking these medications    aspirin 325 MG EC tablet  Take 1 tablet by mouth 2 times daily (with meals)     HYDROcodone-acetaminophen 5-325 MG per tablet  Commonly known as: Norco  Take 1 tablet by mouth every 4 hours as needed for Pain for up to 7 days. Intended supply: 7 days.  Take lowest dose possible to manage pain        CONTINUE taking these medications    omeprazole 20 MG delayed release capsule  Commonly known as: PRILOSEC     valACYclovir 1 g tablet  Commonly known as: VALTREX  take 1 tablet by mouth once daily        STOP taking these medications    oxyCODONE-acetaminophen 5-325 MG per tablet  Commonly known as: PERCOCET           Where to Get Your Medications      You can get these medications from any pharmacy    Bring a paper prescription for each of these medications  · aspirin 325 MG EC tablet  · HYDROcodone-acetaminophen 5-325 MG per tablet           Signed:  Raj Diez DO  3/18/2022  9:52 AM

## 2022-03-18 NOTE — PROGRESS NOTES
3/18/22 2005 per dr lombardi,\" she does not need to stay for obs. If she wants me to see her I can but it will be a while. Have some fractures down here I need to attend to. If not shes is ok to leave as long as her drainage has stopped. Have her fall Dr. Maddox Stamp office if it resumes or come back to the ED if she is concerned \"kmo rn    3/18/22 2020 dr haley sol aware that staffwent to get pt up to try and walk her to make sure the bleeding didnt start up again. she walked the length of the hallway with her walker. we got her back to bed and rechecked dressing her aquacel dressing was half covered in fresh blood again.    Per dr lombardi\" we can do obs and I can see her later tonight\" dontae rn

## 2022-06-09 DIAGNOSIS — B00.1 RECURRENT COLD SORES: ICD-10-CM

## 2022-06-09 RX ORDER — VALACYCLOVIR HYDROCHLORIDE 1 G/1
TABLET, FILM COATED ORAL
Qty: 4 TABLET | Refills: 2 | Status: SHIPPED
Start: 2022-06-09 | End: 2022-09-21

## 2022-07-22 ENCOUNTER — TELEPHONE (OUTPATIENT)
Dept: FAMILY MEDICINE CLINIC | Age: 52
End: 2022-07-22

## 2022-07-22 NOTE — TELEPHONE ENCOUNTER
I looked in her history and explained I did not see it and asked who rx'd it prior she said only us.  She is aware she may have to come in and be seen

## 2022-09-21 DIAGNOSIS — B00.1 RECURRENT COLD SORES: ICD-10-CM

## 2022-09-21 RX ORDER — VALACYCLOVIR HYDROCHLORIDE 1 G/1
TABLET, FILM COATED ORAL
Qty: 4 TABLET | Refills: 2 | Status: SHIPPED | OUTPATIENT
Start: 2022-09-21

## 2022-12-14 ENCOUNTER — COMMUNITY OUTREACH (OUTPATIENT)
Dept: FAMILY MEDICINE CLINIC | Age: 52
End: 2022-12-14

## 2023-01-04 DIAGNOSIS — Z12.31 ENCOUNTER FOR SCREENING MAMMOGRAM FOR MALIGNANT NEOPLASM OF BREAST: Primary | ICD-10-CM

## 2023-02-02 DIAGNOSIS — B00.1 RECURRENT COLD SORES: ICD-10-CM

## 2023-02-02 RX ORDER — VALACYCLOVIR HYDROCHLORIDE 1 G/1
TABLET, FILM COATED ORAL
Qty: 4 TABLET | Refills: 0 | Status: SHIPPED | OUTPATIENT
Start: 2023-02-02

## 2023-02-02 NOTE — TELEPHONE ENCOUNTER
Requested Prescriptions     Pending Prescriptions Disp Refills    valACYclovir (VALTREX) 1 g tablet [Pharmacy Med Name: VALACYCLOVIR HCL 1 GRAM TABLET] 4 tablet 0     Sig: take 1 tablet by mouth once daily       Next appt is Visit date not found  Last appt was 3/15/2022

## 2023-03-17 ENCOUNTER — TELEPHONE (OUTPATIENT)
Dept: FAMILY MEDICINE CLINIC | Age: 53
End: 2023-03-17

## 2023-03-17 NOTE — TELEPHONE ENCOUNTER
Called pt and left VM reminding pt to schedule mammogram. Also let pt know she is due for a colonoscopy and checking to see if it is ok to place that order. Requested a call back from pt.

## 2023-03-22 DIAGNOSIS — B00.1 RECURRENT COLD SORES: ICD-10-CM

## 2023-03-22 RX ORDER — VALACYCLOVIR HYDROCHLORIDE 1 G/1
TABLET, FILM COATED ORAL
Qty: 4 TABLET | Refills: 0 | Status: SHIPPED | OUTPATIENT
Start: 2023-03-22

## 2023-03-31 ENCOUNTER — TELEPHONE (OUTPATIENT)
Dept: FAMILY MEDICINE CLINIC | Age: 53
End: 2023-03-31

## 2023-09-06 DIAGNOSIS — B00.1 RECURRENT COLD SORES: ICD-10-CM

## 2023-09-06 RX ORDER — VALACYCLOVIR HYDROCHLORIDE 1 G/1
TABLET, FILM COATED ORAL
Qty: 4 TABLET | Refills: 0 | OUTPATIENT
Start: 2023-09-06

## 2023-09-14 ENCOUNTER — OFFICE VISIT (OUTPATIENT)
Dept: FAMILY MEDICINE CLINIC | Age: 53
End: 2023-09-14
Payer: COMMERCIAL

## 2023-09-14 VITALS
RESPIRATION RATE: 14 BRPM | DIASTOLIC BLOOD PRESSURE: 86 MMHG | HEIGHT: 63 IN | WEIGHT: 180.2 LBS | BODY MASS INDEX: 31.93 KG/M2 | SYSTOLIC BLOOD PRESSURE: 138 MMHG | OXYGEN SATURATION: 98 % | TEMPERATURE: 97 F | HEART RATE: 78 BPM

## 2023-09-14 DIAGNOSIS — B00.1 RECURRENT COLD SORES: ICD-10-CM

## 2023-09-14 PROCEDURE — 99213 OFFICE O/P EST LOW 20 MIN: CPT | Performed by: FAMILY MEDICINE

## 2023-09-14 RX ORDER — OXYCODONE AND ACETAMINOPHEN 10; 325 MG/1; MG/1
1 TABLET ORAL 3 TIMES DAILY
COMMUNITY
Start: 2023-09-12

## 2023-09-14 RX ORDER — VALACYCLOVIR HYDROCHLORIDE 1 G/1
1000 TABLET, FILM COATED ORAL DAILY
Qty: 6 TABLET | Refills: 5 | Status: SHIPPED | OUTPATIENT
Start: 2023-09-14

## 2023-09-14 SDOH — ECONOMIC STABILITY: FOOD INSECURITY: WITHIN THE PAST 12 MONTHS, THE FOOD YOU BOUGHT JUST DIDN'T LAST AND YOU DIDN'T HAVE MONEY TO GET MORE.: NEVER TRUE

## 2023-09-14 SDOH — ECONOMIC STABILITY: HOUSING INSECURITY
IN THE LAST 12 MONTHS, WAS THERE A TIME WHEN YOU DID NOT HAVE A STEADY PLACE TO SLEEP OR SLEPT IN A SHELTER (INCLUDING NOW)?: NO

## 2023-09-14 SDOH — ECONOMIC STABILITY: INCOME INSECURITY: HOW HARD IS IT FOR YOU TO PAY FOR THE VERY BASICS LIKE FOOD, HOUSING, MEDICAL CARE, AND HEATING?: NOT HARD AT ALL

## 2023-09-14 SDOH — ECONOMIC STABILITY: FOOD INSECURITY: WITHIN THE PAST 12 MONTHS, YOU WORRIED THAT YOUR FOOD WOULD RUN OUT BEFORE YOU GOT MONEY TO BUY MORE.: NEVER TRUE

## 2023-09-14 ASSESSMENT — ENCOUNTER SYMPTOMS
WHEEZING: 0
DIARRHEA: 0
NAUSEA: 0
BLOOD IN STOOL: 0
VOMITING: 0
CONSTIPATION: 0
SHORTNESS OF BREATH: 0
ABDOMINAL PAIN: 0
COUGH: 0

## 2023-09-14 ASSESSMENT — PATIENT HEALTH QUESTIONNAIRE - PHQ9
3. TROUBLE FALLING OR STAYING ASLEEP: 2
SUM OF ALL RESPONSES TO PHQ9 QUESTIONS 1 & 2: 5
SUM OF ALL RESPONSES TO PHQ QUESTIONS 1-9: 8
1. LITTLE INTEREST OR PLEASURE IN DOING THINGS: 2
SUM OF ALL RESPONSES TO PHQ QUESTIONS 1-9: 8
8. MOVING OR SPEAKING SO SLOWLY THAT OTHER PEOPLE COULD HAVE NOTICED. OR THE OPPOSITE, BEING SO FIGETY OR RESTLESS THAT YOU HAVE BEEN MOVING AROUND A LOT MORE THAN USUAL: 0
7. TROUBLE CONCENTRATING ON THINGS, SUCH AS READING THE NEWSPAPER OR WATCHING TELEVISION: 0
5. POOR APPETITE OR OVEREATING: 0
9. THOUGHTS THAT YOU WOULD BE BETTER OFF DEAD, OR OF HURTING YOURSELF: 0
SUM OF ALL RESPONSES TO PHQ QUESTIONS 1-9: 8
4. FEELING TIRED OR HAVING LITTLE ENERGY: 1
SUM OF ALL RESPONSES TO PHQ QUESTIONS 1-9: 8
2. FEELING DOWN, DEPRESSED OR HOPELESS: 3
6. FEELING BAD ABOUT YOURSELF - OR THAT YOU ARE A FAILURE OR HAVE LET YOURSELF OR YOUR FAMILY DOWN: 0
10. IF YOU CHECKED OFF ANY PROBLEMS, HOW DIFFICULT HAVE THESE PROBLEMS MADE IT FOR YOU TO DO YOUR WORK, TAKE CARE OF THINGS AT HOME, OR GET ALONG WITH OTHER PEOPLE: 1

## 2024-04-11 ENCOUNTER — TELEPHONE (OUTPATIENT)
Dept: FAMILY MEDICINE CLINIC | Age: 54
End: 2024-04-11

## 2024-04-11 NOTE — TELEPHONE ENCOUNTER
This MA attempted to reach pt. No answer. This MA left message for pt requesting return call to office regarding her mammogram.     Electronically signed by ROLANDA PANIAGUA MA on 4/11/24 at 10:37 AM EDT

## 2024-06-24 ENCOUNTER — TELEPHONE (OUTPATIENT)
Dept: FAMILY MEDICINE CLINIC | Age: 54
End: 2024-06-24

## 2024-06-24 NOTE — TELEPHONE ENCOUNTER
This MA attempted to reach pt. No answer. This MA left message for pt requesting return call to office regarding scheduling her mammogram.    Electronically signed by ROLANDA PANIAGUA MA on 6/24/24 at 10:40 AM EDT

## 2024-06-28 NOTE — TELEPHONE ENCOUNTER
3rd attempt to reach out to pt. Message left for pt to call the office to schedule her mammogram.    Letter sent    Electronically signed by ROLANDA PANIAGUA MA on 6/28/24 at 3:46 PM EDT

## 2024-07-22 ENCOUNTER — OFFICE VISIT (OUTPATIENT)
Dept: FAMILY MEDICINE CLINIC | Age: 54
End: 2024-07-22
Payer: COMMERCIAL

## 2024-07-22 VITALS
DIASTOLIC BLOOD PRESSURE: 80 MMHG | HEART RATE: 78 BPM | RESPIRATION RATE: 16 BRPM | HEIGHT: 63 IN | TEMPERATURE: 98.4 F | SYSTOLIC BLOOD PRESSURE: 158 MMHG | BODY MASS INDEX: 32.21 KG/M2 | WEIGHT: 181.8 LBS | OXYGEN SATURATION: 97 %

## 2024-07-22 DIAGNOSIS — Z12.11 COLON CANCER SCREENING: Primary | ICD-10-CM

## 2024-07-22 DIAGNOSIS — D21.9 FIBROMA: ICD-10-CM

## 2024-07-22 DIAGNOSIS — R73.9 HYPERGLYCEMIA: ICD-10-CM

## 2024-07-22 DIAGNOSIS — E78.2 MIXED HYPERLIPIDEMIA: ICD-10-CM

## 2024-07-22 DIAGNOSIS — R73.09 ELEVATED GLUCOSE: ICD-10-CM

## 2024-07-22 LAB
ALBUMIN: 4.2 G/DL (ref 3.5–5.2)
ALP BLD-CCNC: 113 U/L (ref 35–104)
ALT SERPL-CCNC: 41 U/L (ref 0–32)
ANION GAP SERPL CALCULATED.3IONS-SCNC: 13 MMOL/L (ref 7–16)
AST SERPL-CCNC: 50 U/L (ref 0–31)
BASOPHILS ABSOLUTE: 0.05 K/UL (ref 0–0.2)
BASOPHILS RELATIVE PERCENT: 1 % (ref 0–2)
BILIRUB SERPL-MCNC: 0.3 MG/DL (ref 0–1.2)
BUN BLDV-MCNC: 7 MG/DL (ref 6–20)
CALCIUM SERPL-MCNC: 9.1 MG/DL (ref 8.6–10.2)
CHLORIDE BLD-SCNC: 100 MMOL/L (ref 98–107)
CO2: 21 MMOL/L (ref 22–29)
CREAT SERPL-MCNC: 0.6 MG/DL (ref 0.5–1)
EOSINOPHILS ABSOLUTE: 0.16 K/UL (ref 0.05–0.5)
EOSINOPHILS RELATIVE PERCENT: 2 % (ref 0–6)
GFR, ESTIMATED: >90 ML/MIN/1.73M2
GLUCOSE BLD-MCNC: 109 MG/DL (ref 74–99)
HCT VFR BLD CALC: 45.9 % (ref 34–48)
HEMOGLOBIN: 15 G/DL (ref 11.5–15.5)
IMMATURE GRANULOCYTES %: 1 % (ref 0–5)
IMMATURE GRANULOCYTES ABSOLUTE: 0.06 K/UL (ref 0–0.58)
LYMPHOCYTES ABSOLUTE: 2.03 K/UL (ref 1.5–4)
LYMPHOCYTES RELATIVE PERCENT: 26 % (ref 20–42)
MCH RBC QN AUTO: 32.3 PG (ref 26–35)
MCHC RBC AUTO-ENTMCNC: 32.7 G/DL (ref 32–34.5)
MCV RBC AUTO: 98.9 FL (ref 80–99.9)
MONOCYTES ABSOLUTE: 0.58 K/UL (ref 0.1–0.95)
MONOCYTES RELATIVE PERCENT: 8 % (ref 2–12)
NEUTROPHILS ABSOLUTE: 4.81 K/UL (ref 1.8–7.3)
NEUTROPHILS RELATIVE PERCENT: 63 % (ref 43–80)
PDW BLD-RTO: 12.3 % (ref 11.5–15)
PLATELET # BLD: 356 K/UL (ref 130–450)
PMV BLD AUTO: 9.7 FL (ref 7–12)
POTASSIUM SERPL-SCNC: 5.2 MMOL/L (ref 3.5–5)
RBC # BLD: 4.64 M/UL (ref 3.5–5.5)
SODIUM BLD-SCNC: 134 MMOL/L (ref 132–146)
TOTAL PROTEIN: 8 G/DL (ref 6.4–8.3)
WBC # BLD: 7.7 K/UL (ref 4.5–11.5)

## 2024-07-22 PROCEDURE — 99396 PREV VISIT EST AGE 40-64: CPT | Performed by: FAMILY MEDICINE

## 2024-07-22 RX ORDER — CYCLOBENZAPRINE HCL 10 MG
10 TABLET ORAL 3 TIMES DAILY
COMMUNITY
Start: 2024-07-19

## 2024-07-22 ASSESSMENT — ENCOUNTER SYMPTOMS
VOMITING: 0
NAUSEA: 0
ABDOMINAL PAIN: 0
BLOOD IN STOOL: 0
DIARRHEA: 0
COUGH: 0
SHORTNESS OF BREATH: 0
CONSTIPATION: 0
WHEEZING: 0

## 2024-07-22 NOTE — PROGRESS NOTES
Patsy Kennedy (:  1970) is a 53 y.o. female,Established patient, here for evaluation of the following chief complaint(s):  Annual Exam (Lump on RT side under armpit, is tender causing discomfort /Numbness on RT side of face comes and goes /Bilateral foot pain with multiple lumps on bottom of both feet )      Assessment & Plan   ASSESSMENT/PLAN:  1. Colon cancer screening  -     External Referral To Gastroenterology  2. Mixed hyperlipidemia  -     Lipid Panel; Future  3. Hyperglycemia  -     Lipid Panel; Future  -     Comprehensive Metabolic Panel; Future  -     CBC with Auto Differential; Future  4. Fibroma  -     [facility] Girard DPM, Podiatry, [place]      No follow-ups on file.         Subjective   SUBJECTIVE/OBJECTIVE:  Annual Exam (Lump on RT side under armpit, is tender causing discomfort /Numbness on RT side of face comes and goes /Bilateral foot pain with multiple lumps on bottom of both feet )          Review of Systems   Constitutional:  Negative for chills, diaphoresis and fever.   HENT:  Negative for ear discharge, ear pain, hearing loss, nosebleeds and tinnitus.    Respiratory:  Negative for cough, shortness of breath and wheezing.    Cardiovascular:  Negative for chest pain.   Gastrointestinal:  Negative for abdominal pain, blood in stool, constipation, diarrhea, nausea and vomiting.   Genitourinary:  Negative for dysuria, flank pain and hematuria.   Musculoskeletal:  Negative for myalgias.   Skin:  Negative for rash.   Neurological:  Negative for headaches.   Hematological:  Does not bruise/bleed easily.   Psychiatric/Behavioral:  Negative for hallucinations and suicidal ideas.           Objective   BP (!) 164/72   Pulse 78   Temp 98.4 °F (36.9 °C)   Resp 16   Ht 1.6 m (5' 2.99\")   Wt 82.5 kg (181 lb 12.8 oz)   SpO2 97%   BMI 32.21 kg/m²   Lab Results   Component Value Date    LABA1C 5.3 2022     Physical Exam  Constitutional:       General: She is not in acute

## 2024-07-23 DIAGNOSIS — R73.09 ELEVATED GLUCOSE: ICD-10-CM

## 2024-07-23 DIAGNOSIS — R73.09 ELEVATED GLUCOSE: Primary | ICD-10-CM

## 2024-07-23 LAB
CHOLESTEROL, TOTAL: 220 MG/DL
HBA1C MFR BLD: 5.3 % (ref 4–5.6)
HDLC SERPL-MCNC: 69 MG/DL
LDL CHOLESTEROL: 136 MG/DL
TRIGL SERPL-MCNC: 74 MG/DL
VLDLC SERPL CALC-MCNC: 15 MG/DL

## 2024-08-06 ENCOUNTER — OFFICE VISIT (OUTPATIENT)
Dept: PODIATRY | Age: 54
End: 2024-08-06
Payer: COMMERCIAL

## 2024-08-06 VITALS — HEIGHT: 62 IN | BODY MASS INDEX: 33.31 KG/M2 | WEIGHT: 181 LBS

## 2024-08-06 DIAGNOSIS — R26.2 DIFFICULTY WALKING: ICD-10-CM

## 2024-08-06 DIAGNOSIS — M72.2 PLANTAR FASCIAL FIBROMATOSIS OF BOTH FEET: Primary | ICD-10-CM

## 2024-08-06 DIAGNOSIS — M79.671 PAIN OF RIGHT FOOT: ICD-10-CM

## 2024-08-06 DIAGNOSIS — M79.672 PAIN IN LEFT FOOT: ICD-10-CM

## 2024-08-06 PROCEDURE — 99203 OFFICE O/P NEW LOW 30 MIN: CPT | Performed by: PODIATRIST

## 2024-08-06 NOTE — PROGRESS NOTES
New patient here for painful lumps on the bottom of both her feet. Edwar Shah, DO last visit 7/22/2024   Electronically signed by Yamini Lewis LPN on 8/6/2024 at 11:41 AM    
  Procedure Laterality Date    APPENDECTOMY  16 years old     SECTION  age 21    KNEE ARTHROSCOPY Left 2021    ARTHROSCOPY LEFT KNEE AND  LEFT MENISECTOMY(09774) performed by Epi Munoz DO at [facility] OR    KNEE CARTILAGE SURGERY Left 2016    TOTAL KNEE ARTHROPLASTY Left 3/17/2022    LEFT TOTAL KNEE REPLACEMENT (DEPUY) performed by Epi Munoz DO at [facility] OR     Social History     Tobacco Use    Smoking status: Every Day     Current packs/day: 0.50     Average packs/day: 0.5 packs/day for 30.0 years (15.0 ttl pk-yrs)     Types: Cigarettes    Smokeless tobacco: Never   Vaping Use    Vaping Use: Never used   Substance Use Topics    Alcohol use: Yes     Alcohol/week: 4.0 standard drinks of alcohol     Types: 4 Cans of beer per week     Comment: occasionally, on weekends    Drug use: Not Currently           Diagnostic studies:    No results found.      Procedures:    None    Exam:  VASCULAR: Pedal pulses palpable bilateral foot.  Capillary refill time brisk to digits 1 through 5 bilateral foot.  NEUROLOGICAL: Epicritic sensations intact and symmetrical  DERMATOLOGICAL: Soft tissue fibromas noted central arch regions bilaterally along the course of the medial band plantar fascia.  No overlying hyperpigmentation, calor, erythema, skin abrasion, or any signs of infection noted bilateral foot.  MUSCULOSKELETAL: Adequate range of motion ankle and subtalar joint regions noted bilaterally.  Tenderness noted into the plantar arch region with range of motion MTPJ areas bilaterally.  Mild antalgic gait noted upon evaluation.    Plan Per Assessment  Patsy was seen today for foot pain.    Diagnoses and all orders for this visit:    Plantar fascial fibromatosis of both feet  -     Amb External Referral For Orthotics    Pain of right foot  -     Amb External Referral For Orthotics    Pain in left foot  -     Amb External Referral For Orthotics    Difficulty walking  -     Amb External Referral For

## 2024-09-24 ENCOUNTER — ANTI-COAG VISIT (OUTPATIENT)
Dept: FAMILY MEDICINE CLINIC | Age: 54
End: 2024-09-24

## 2024-09-24 ENCOUNTER — OFFICE VISIT (OUTPATIENT)
Dept: FAMILY MEDICINE CLINIC | Age: 54
End: 2024-09-24
Payer: COMMERCIAL

## 2024-09-24 ENCOUNTER — HOSPITAL ENCOUNTER (OUTPATIENT)
Dept: CT IMAGING | Age: 54
Discharge: HOME | End: 2024-09-24
Payer: COMMERCIAL

## 2024-09-24 VITALS
OXYGEN SATURATION: 98 % | HEIGHT: 62 IN | TEMPERATURE: 98.8 F | BODY MASS INDEX: 33.57 KG/M2 | RESPIRATION RATE: 16 BRPM | DIASTOLIC BLOOD PRESSURE: 90 MMHG | WEIGHT: 182.4 LBS | HEART RATE: 95 BPM | SYSTOLIC BLOOD PRESSURE: 164 MMHG

## 2024-09-24 DIAGNOSIS — R10.2 PELVIC PAIN: ICD-10-CM

## 2024-09-24 DIAGNOSIS — R30.0 BURNING WITH URINATION: Primary | ICD-10-CM

## 2024-09-24 DIAGNOSIS — R10.32 LLQ PAIN: ICD-10-CM

## 2024-09-24 DIAGNOSIS — R68.83 CHILLS: ICD-10-CM

## 2024-09-24 LAB
BILIRUBIN, POC: ABNORMAL
BLOOD URINE, POC: NEGATIVE
CLARITY, POC: CLEAR
COLOR, POC: YELLOW
GLUCOSE URINE, POC: NEGATIVE MG/DL
KETONES, POC: NEGATIVE MG/DL
LEUKOCYTE EST, POC: ABNORMAL
NITRITE, POC: NEGATIVE
PH, POC: 7
PROTEIN, POC: NEGATIVE MG/DL
SPECIFIC GRAVITY, POC: 1.01
UROBILINOGEN, POC: 0.2 MG/DL

## 2024-09-24 PROCEDURE — 81002 URINALYSIS NONAUTO W/O SCOPE: CPT | Performed by: NURSE PRACTITIONER

## 2024-09-24 PROCEDURE — 99214 OFFICE O/P EST MOD 30 MIN: CPT | Performed by: NURSE PRACTITIONER

## 2024-09-24 PROCEDURE — 74177 CT ABD & PELVIS W/CONTRAST: CPT

## 2024-09-24 PROCEDURE — 6360000004 HC RX CONTRAST MEDICATION: Performed by: RADIOLOGY

## 2024-09-24 RX ORDER — IOPAMIDOL 755 MG/ML
18 INJECTION, SOLUTION INTRAVASCULAR
Status: COMPLETED | OUTPATIENT
Start: 2024-09-24 | End: 2024-09-24

## 2024-09-24 RX ORDER — IOPAMIDOL 755 MG/ML
75 INJECTION, SOLUTION INTRAVASCULAR
Status: COMPLETED | OUTPATIENT
Start: 2024-09-24 | End: 2024-09-24

## 2024-09-24 RX ADMIN — IOPAMIDOL 75 ML: 755 INJECTION, SOLUTION INTRAVENOUS at 13:05

## 2024-09-24 RX ADMIN — IOPAMIDOL 18 ML: 755 INJECTION, SOLUTION INTRAVENOUS at 13:05

## 2024-09-24 SDOH — ECONOMIC STABILITY: FOOD INSECURITY: WITHIN THE PAST 12 MONTHS, THE FOOD YOU BOUGHT JUST DIDN'T LAST AND YOU DIDN'T HAVE MONEY TO GET MORE.: NEVER TRUE

## 2024-09-24 SDOH — ECONOMIC STABILITY: FOOD INSECURITY: WITHIN THE PAST 12 MONTHS, YOU WORRIED THAT YOUR FOOD WOULD RUN OUT BEFORE YOU GOT MONEY TO BUY MORE.: NEVER TRUE

## 2024-09-24 SDOH — ECONOMIC STABILITY: INCOME INSECURITY: HOW HARD IS IT FOR YOU TO PAY FOR THE VERY BASICS LIKE FOOD, HOUSING, MEDICAL CARE, AND HEATING?: NOT HARD AT ALL

## 2024-09-24 ASSESSMENT — ENCOUNTER SYMPTOMS
BACK PAIN: 1
WHEEZING: 0
COUGH: 0
CONSTIPATION: 0
NAUSEA: 0
VOMITING: 0
ABDOMINAL PAIN: 1
DIARRHEA: 0
SHORTNESS OF BREATH: 0

## 2024-09-24 ASSESSMENT — PATIENT HEALTH QUESTIONNAIRE - PHQ9
1. LITTLE INTEREST OR PLEASURE IN DOING THINGS: NOT AT ALL
2. FEELING DOWN, DEPRESSED OR HOPELESS: NOT AT ALL
SUM OF ALL RESPONSES TO PHQ QUESTIONS 1-9: 0
SUM OF ALL RESPONSES TO PHQ QUESTIONS 1-9: 0
SUM OF ALL RESPONSES TO PHQ9 QUESTIONS 1 & 2: 0
SUM OF ALL RESPONSES TO PHQ QUESTIONS 1-9: 0
SUM OF ALL RESPONSES TO PHQ QUESTIONS 1-9: 0

## 2024-10-18 ENCOUNTER — HOSPITAL ENCOUNTER (OUTPATIENT)
Dept: MAMMOGRAPHY | Age: 54
Discharge: HOME | End: 2024-10-20
Attending: FAMILY MEDICINE
Payer: COMMERCIAL

## 2024-10-18 VITALS — WEIGHT: 170 LBS | HEIGHT: 63 IN | BODY MASS INDEX: 30.12 KG/M2

## 2024-10-18 DIAGNOSIS — Z12.31 SCREENING MAMMOGRAM FOR BREAST CANCER: ICD-10-CM

## 2024-10-18 PROCEDURE — 77063 BREAST TOMOSYNTHESIS BI: CPT

## 2025-04-08 ENCOUNTER — TELEPHONE (OUTPATIENT)
Dept: FAMILY MEDICINE CLINIC | Age: 55
End: 2025-04-08

## 2025-05-28 ENCOUNTER — HOSPITAL ENCOUNTER (INPATIENT)
Age: 55
LOS: 6 days | Discharge: HOME | DRG: 193 | End: 2025-06-03
Attending: EMERGENCY MEDICINE | Admitting: INTERNAL MEDICINE
Payer: COMMERCIAL

## 2025-05-28 ENCOUNTER — APPOINTMENT (OUTPATIENT)
Dept: GENERAL RADIOLOGY | Age: 55
DRG: 193 | End: 2025-05-28
Payer: COMMERCIAL

## 2025-05-28 DIAGNOSIS — R09.02 HYPOXIA: ICD-10-CM

## 2025-05-28 DIAGNOSIS — E87.1 HYPONATREMIA: ICD-10-CM

## 2025-05-28 DIAGNOSIS — J18.9 PNEUMONIA OF BOTH LUNGS DUE TO INFECTIOUS ORGANISM, UNSPECIFIED PART OF LUNG: ICD-10-CM

## 2025-05-28 DIAGNOSIS — J12.3 HUMAN METAPNEUMOVIRUS (HMPV) PNEUMONIA: Primary | ICD-10-CM

## 2025-05-28 LAB
ALBUMIN SERPL-MCNC: 3.5 G/DL (ref 3.5–5.2)
ALP SERPL-CCNC: 87 U/L (ref 35–104)
ALT SERPL-CCNC: 27 U/L (ref 0–32)
ANION GAP SERPL CALCULATED.3IONS-SCNC: 15 MMOL/L (ref 7–16)
AST SERPL-CCNC: 46 U/L (ref 0–31)
B PARAP IS1001 DNA NPH QL NAA+NON-PROBE: NOT DETECTED
B PERT DNA SPEC QL NAA+PROBE: NOT DETECTED
BASOPHILS # BLD: 0.02 K/UL (ref 0–0.2)
BASOPHILS NFR BLD: 0 % (ref 0–2)
BILIRUB SERPL-MCNC: 0.3 MG/DL (ref 0–1.2)
BNP SERPL-MCNC: 174 PG/ML (ref 0–450)
BUN SERPL-MCNC: 4 MG/DL (ref 6–20)
C PNEUM DNA NPH QL NAA+NON-PROBE: NOT DETECTED
CALCIUM SERPL-MCNC: 8.6 MG/DL (ref 8.6–10.2)
CHLORIDE SERPL-SCNC: 90 MMOL/L (ref 98–107)
CO2 SERPL-SCNC: 19 MMOL/L (ref 22–29)
CREAT SERPL-MCNC: 0.5 MG/DL (ref 0.5–1)
D-DIMER QUANTITATIVE: <200 NG/ML DDU (ref 0–230)
EOSINOPHIL # BLD: 0 K/UL (ref 0.05–0.5)
EOSINOPHILS RELATIVE PERCENT: 0 % (ref 0–6)
ERYTHROCYTE [DISTWIDTH] IN BLOOD BY AUTOMATED COUNT: 11.6 % (ref 11.5–15)
FLUAV RNA NPH QL NAA+NON-PROBE: NOT DETECTED
FLUAV RNA RESP QL NAA+PROBE: NOT DETECTED
FLUBV RNA NPH QL NAA+NON-PROBE: NOT DETECTED
FLUBV RNA RESP QL NAA+PROBE: NOT DETECTED
GFR, ESTIMATED: >90 ML/MIN/1.73M2
GLUCOSE SERPL-MCNC: 216 MG/DL (ref 74–99)
HADV DNA NPH QL NAA+NON-PROBE: NOT DETECTED
HCOV 229E RNA NPH QL NAA+NON-PROBE: NOT DETECTED
HCOV HKU1 RNA NPH QL NAA+NON-PROBE: NOT DETECTED
HCOV NL63 RNA NPH QL NAA+NON-PROBE: NOT DETECTED
HCOV OC43 RNA NPH QL NAA+NON-PROBE: NOT DETECTED
HCT VFR BLD AUTO: 41.6 % (ref 34–48)
HGB BLD-MCNC: 14.9 G/DL (ref 11.5–15.5)
HMPV RNA NPH QL NAA+NON-PROBE: DETECTED
HPIV1 RNA NPH QL NAA+NON-PROBE: NOT DETECTED
HPIV2 RNA NPH QL NAA+NON-PROBE: NOT DETECTED
HPIV3 RNA NPH QL NAA+NON-PROBE: NOT DETECTED
HPIV4 RNA NPH QL NAA+NON-PROBE: NOT DETECTED
IMM GRANULOCYTES # BLD AUTO: 0.05 K/UL (ref 0–0.58)
IMM GRANULOCYTES NFR BLD: 1 % (ref 0–5)
LYMPHOCYTES NFR BLD: 0.93 K/UL (ref 1.5–4)
LYMPHOCYTES RELATIVE PERCENT: 10 % (ref 20–42)
M PNEUMO DNA NPH QL NAA+NON-PROBE: NOT DETECTED
MCH RBC QN AUTO: 34.3 PG (ref 26–35)
MCHC RBC AUTO-ENTMCNC: 35.8 G/DL (ref 32–34.5)
MCV RBC AUTO: 95.6 FL (ref 80–99.9)
MONOCYTES NFR BLD: 0.36 K/UL (ref 0.1–0.95)
MONOCYTES NFR BLD: 4 % (ref 2–12)
NEUTROPHILS NFR BLD: 85 % (ref 43–80)
NEUTS SEG NFR BLD: 7.89 K/UL (ref 1.8–7.3)
PLATELET # BLD AUTO: 199 K/UL (ref 130–450)
PMV BLD AUTO: 11 FL (ref 7–12)
POTASSIUM SERPL-SCNC: 3.5 MMOL/L (ref 3.5–5)
PROT SERPL-MCNC: 7.2 G/DL (ref 6.4–8.3)
RBC # BLD AUTO: 4.35 M/UL (ref 3.5–5.5)
RSV RNA NPH QL NAA+NON-PROBE: NOT DETECTED
RV+EV RNA NPH QL NAA+NON-PROBE: NOT DETECTED
SARS-COV-2 RNA NPH QL NAA+NON-PROBE: NOT DETECTED
SARS-COV-2 RNA RESP QL NAA+PROBE: NOT DETECTED
SODIUM SERPL-SCNC: 124 MMOL/L (ref 132–146)
SOURCE: NORMAL
SPECIMEN DESCRIPTION: ABNORMAL
SPECIMEN DESCRIPTION: NORMAL
TROPONIN I SERPL HS-MCNC: <6 NG/L (ref 0–14)
WBC OTHER # BLD: 9.3 K/UL (ref 4.5–11.5)

## 2025-05-28 PROCEDURE — 6360000002 HC RX W HCPCS

## 2025-05-28 PROCEDURE — 96374 THER/PROPH/DIAG INJ IV PUSH: CPT

## 2025-05-28 PROCEDURE — 0202U NFCT DS 22 TRGT SARS-COV-2: CPT

## 2025-05-28 PROCEDURE — 83880 ASSAY OF NATRIURETIC PEPTIDE: CPT

## 2025-05-28 PROCEDURE — 2000000000 HC ICU R&B

## 2025-05-28 PROCEDURE — 94640 AIRWAY INHALATION TREATMENT: CPT

## 2025-05-28 PROCEDURE — 87636 SARSCOV2 & INF A&B AMP PRB: CPT

## 2025-05-28 PROCEDURE — 6370000000 HC RX 637 (ALT 250 FOR IP)

## 2025-05-28 PROCEDURE — 2500000003 HC RX 250 WO HCPCS

## 2025-05-28 PROCEDURE — 6370000000 HC RX 637 (ALT 250 FOR IP): Performed by: EMERGENCY MEDICINE

## 2025-05-28 PROCEDURE — 80053 COMPREHEN METABOLIC PANEL: CPT

## 2025-05-28 PROCEDURE — 85379 FIBRIN DEGRADATION QUANT: CPT

## 2025-05-28 PROCEDURE — 85025 COMPLETE CBC W/AUTO DIFF WBC: CPT

## 2025-05-28 PROCEDURE — 84484 ASSAY OF TROPONIN QUANT: CPT

## 2025-05-28 PROCEDURE — 99285 EMERGENCY DEPT VISIT HI MDM: CPT

## 2025-05-28 PROCEDURE — 71046 X-RAY EXAM CHEST 2 VIEWS: CPT

## 2025-05-28 RX ORDER — PANTOPRAZOLE SODIUM 40 MG/1
40 TABLET, DELAYED RELEASE ORAL
Status: DISCONTINUED | OUTPATIENT
Start: 2025-05-29 | End: 2025-06-03 | Stop reason: HOSPADM

## 2025-05-28 RX ORDER — OXYCODONE AND ACETAMINOPHEN 10; 325 MG/1; MG/1
1 TABLET ORAL EVERY 8 HOURS PRN
Refills: 0 | Status: DISCONTINUED | OUTPATIENT
Start: 2025-05-28 | End: 2025-06-03 | Stop reason: HOSPADM

## 2025-05-28 RX ORDER — BUDESONIDE 0.5 MG/2ML
0.5 INHALANT ORAL
Status: DISCONTINUED | OUTPATIENT
Start: 2025-05-28 | End: 2025-06-03 | Stop reason: HOSPADM

## 2025-05-28 RX ORDER — ALBUTEROL SULFATE 0.83 MG/ML
2.5 SOLUTION RESPIRATORY (INHALATION) EVERY 6 HOURS PRN
Status: DISCONTINUED | OUTPATIENT
Start: 2025-05-28 | End: 2025-05-29

## 2025-05-28 RX ORDER — POTASSIUM CHLORIDE 1500 MG/1
40 TABLET, EXTENDED RELEASE ORAL PRN
Status: DISCONTINUED | OUTPATIENT
Start: 2025-05-28 | End: 2025-06-03 | Stop reason: HOSPADM

## 2025-05-28 RX ORDER — IPRATROPIUM BROMIDE AND ALBUTEROL SULFATE 2.5; .5 MG/3ML; MG/3ML
3 SOLUTION RESPIRATORY (INHALATION) ONCE
Status: COMPLETED | OUTPATIENT
Start: 2025-05-28 | End: 2025-05-28

## 2025-05-28 RX ORDER — METOPROLOL SUCCINATE 100 MG/1
100 TABLET, EXTENDED RELEASE ORAL DAILY
Status: DISCONTINUED | OUTPATIENT
Start: 2025-05-29 | End: 2025-06-03 | Stop reason: HOSPADM

## 2025-05-28 RX ORDER — 0.9 % SODIUM CHLORIDE 0.9 %
1000 INTRAVENOUS SOLUTION INTRAVENOUS ONCE
Status: DISCONTINUED | OUTPATIENT
Start: 2025-05-28 | End: 2025-05-30

## 2025-05-28 RX ORDER — OXYCODONE AND ACETAMINOPHEN 10; 325 MG/1; MG/1
1 TABLET ORAL 3 TIMES DAILY
COMMUNITY
Start: 2025-05-12

## 2025-05-28 RX ORDER — DOXYCYCLINE 100 MG/1
100 CAPSULE ORAL 2 TIMES DAILY
Status: ON HOLD | COMMUNITY
Start: 2025-05-27 | End: 2025-06-03 | Stop reason: HOSPADM

## 2025-05-28 RX ORDER — ARFORMOTEROL TARTRATE 15 UG/2ML
15 SOLUTION RESPIRATORY (INHALATION)
Status: DISCONTINUED | OUTPATIENT
Start: 2025-05-28 | End: 2025-06-03 | Stop reason: HOSPADM

## 2025-05-28 RX ORDER — PROCHLORPERAZINE EDISYLATE 5 MG/ML
10 INJECTION INTRAMUSCULAR; INTRAVENOUS EVERY 6 HOURS PRN
Status: DISCONTINUED | OUTPATIENT
Start: 2025-05-28 | End: 2025-06-03 | Stop reason: HOSPADM

## 2025-05-28 RX ORDER — ACETAMINOPHEN 325 MG/1
650 TABLET ORAL EVERY 6 HOURS PRN
Status: DISCONTINUED | OUTPATIENT
Start: 2025-05-28 | End: 2025-06-03 | Stop reason: HOSPADM

## 2025-05-28 RX ORDER — MAGNESIUM SULFATE IN WATER 40 MG/ML
2000 INJECTION, SOLUTION INTRAVENOUS PRN
Status: DISCONTINUED | OUTPATIENT
Start: 2025-05-28 | End: 2025-06-03 | Stop reason: HOSPADM

## 2025-05-28 RX ORDER — AZITHROMYCIN 250 MG/1
TABLET, FILM COATED ORAL
Qty: 6 TABLET | Refills: 0 | Status: SHIPPED | OUTPATIENT
Start: 2025-05-28 | End: 2025-06-03 | Stop reason: HOSPADM

## 2025-05-28 RX ORDER — PREDNISONE 20 MG/1
20 TABLET ORAL 2 TIMES DAILY
Qty: 10 TABLET | Refills: 0 | Status: SHIPPED | OUTPATIENT
Start: 2025-05-28 | End: 2025-06-03 | Stop reason: HOSPADM

## 2025-05-28 RX ORDER — AZITHROMYCIN 250 MG/1
500 TABLET, FILM COATED ORAL ONCE
Status: COMPLETED | OUTPATIENT
Start: 2025-05-28 | End: 2025-05-28

## 2025-05-28 RX ORDER — PREDNISONE 20 MG/1
20 TABLET ORAL DAILY
COMMUNITY
Start: 2025-05-26 | End: 2025-06-03 | Stop reason: HOSPADM

## 2025-05-28 RX ORDER — ENOXAPARIN SODIUM 100 MG/ML
40 INJECTION SUBCUTANEOUS DAILY
Status: DISCONTINUED | OUTPATIENT
Start: 2025-05-28 | End: 2025-06-03 | Stop reason: HOSPADM

## 2025-05-28 RX ORDER — ALBUTEROL SULFATE 0.83 MG/ML
2.5 SOLUTION RESPIRATORY (INHALATION) EVERY 6 HOURS PRN
Qty: 120 EACH | Refills: 0 | Status: SHIPPED | OUTPATIENT
Start: 2025-05-28 | End: 2025-06-03 | Stop reason: HOSPADM

## 2025-05-28 RX ORDER — MECOBALAMIN 5000 MCG
5 TABLET,DISINTEGRATING ORAL NIGHTLY PRN
Status: DISCONTINUED | OUTPATIENT
Start: 2025-05-28 | End: 2025-06-03 | Stop reason: HOSPADM

## 2025-05-28 RX ORDER — METOPROLOL SUCCINATE 100 MG/1
100 TABLET, EXTENDED RELEASE ORAL DAILY
COMMUNITY
Start: 2025-05-19

## 2025-05-28 RX ORDER — POTASSIUM CHLORIDE 7.45 MG/ML
10 INJECTION INTRAVENOUS PRN
Status: DISCONTINUED | OUTPATIENT
Start: 2025-05-28 | End: 2025-06-03 | Stop reason: HOSPADM

## 2025-05-28 RX ORDER — GUAIFENESIN 200 MG/10ML
200 LIQUID ORAL EVERY 4 HOURS PRN
Status: DISCONTINUED | OUTPATIENT
Start: 2025-05-28 | End: 2025-06-03 | Stop reason: HOSPADM

## 2025-05-28 RX ORDER — IPRATROPIUM BROMIDE AND ALBUTEROL SULFATE 2.5; .5 MG/3ML; MG/3ML
1 SOLUTION RESPIRATORY (INHALATION) EVERY 4 HOURS PRN
Status: DISCONTINUED | OUTPATIENT
Start: 2025-05-28 | End: 2025-06-03 | Stop reason: HOSPADM

## 2025-05-28 RX ADMIN — IPRATROPIUM BROMIDE AND ALBUTEROL SULFATE 3 DOSE: .5; 3 SOLUTION RESPIRATORY (INHALATION) at 14:37

## 2025-05-28 RX ADMIN — METHYLPREDNISOLONE SODIUM SUCCINATE 125 MG: 125 INJECTION INTRAMUSCULAR; INTRAVENOUS at 14:59

## 2025-05-28 RX ADMIN — OXYCODONE AND ACETAMINOPHEN 1 TABLET: 10; 325 TABLET ORAL at 23:17

## 2025-05-28 RX ADMIN — METHYLPREDNISOLONE SODIUM SUCCINATE 60 MG: 125 INJECTION INTRAMUSCULAR; INTRAVENOUS at 22:40

## 2025-05-28 RX ADMIN — ALBUTEROL SULFATE 2.5 MG: 2.5 SOLUTION RESPIRATORY (INHALATION) at 19:05

## 2025-05-28 RX ADMIN — AZITHROMYCIN DIHYDRATE 500 MG: 250 TABLET ORAL at 20:28

## 2025-05-28 RX ADMIN — ENOXAPARIN SODIUM 40 MG: 100 INJECTION SUBCUTANEOUS at 22:24

## 2025-05-28 RX ADMIN — Medication 1000 ML: at 20:28

## 2025-05-28 RX ADMIN — CEFTRIAXONE SODIUM 1000 MG: 1 INJECTION, POWDER, FOR SOLUTION INTRAMUSCULAR; INTRAVENOUS at 22:39

## 2025-05-28 ASSESSMENT — PAIN SCALES - GENERAL: PAINLEVEL_OUTOF10: 9

## 2025-05-28 ASSESSMENT — PAIN DESCRIPTION - DESCRIPTORS: DESCRIPTORS: ACHING;DISCOMFORT;STABBING

## 2025-05-28 ASSESSMENT — PAIN DESCRIPTION - ORIENTATION: ORIENTATION: LEFT

## 2025-05-28 ASSESSMENT — PAIN DESCRIPTION - LOCATION: LOCATION: BACK;KNEE

## 2025-05-28 NOTE — DISCHARGE INSTRUCTIONS
Call your doctor or return to the Emergency Department immediately if you:      Have increasing shortness of breath not relieved by your inhalers or breathing treatments      Notice your lips or fingers turning blue or gray      Have severe chest pain or tightness      Feel unusually tired, confused, or drowsy      Are unable to speak in full sentences due to shortness of breath      Develop a high fever (>=100.4°F / 38°C) not controllable with tylenol, or worsening cough with thick, green/yellow, or bloody sputum      Notice swelling in your legs or rapid weight gain      Use your rescue inhaler more often without relief    General Instructions:      Take all prescribed medications as directed, including inhalers, steroids, and/or antibiotics      Use your rescue inhaler (e.g., albuterol) every 4-6 hours as needed for shortness of breath      Avoid smoke, strong odors, cold air, and other triggers      Use a spacer with your inhalers if available      Rest as needed, but try to stay active within your limits      Stay well hydrated unless told otherwise      Monitor your oxygen use carefully (if prescribed)    Follow-Up:        Please follow up with your primary care doctor or pulmonologist as soon as possible for an appointment      If you were prescribed steroids or antibiotics, finish the full course unless directed otherwise

## 2025-05-28 NOTE — ED PROVIDER NOTES
[facility] EMERGENCY DEPARTMENT  EMERGENCY DEPARTMENT ENCOUNTER        Pt Name: Patsy Kennedy  MRN: [id number]  Birthdate 1970  Date of evaluation: 5/28/2025  Provider: Raúl Cummins DO  PCP: Edwar Shah DO  Note Started: 3:05 PM EDT 5/28/25    CHIEF COMPLAINT       Chief Complaint   Patient presents with    Shortness of Breath     SOB started a few days ago, patient states she wasn't feeling well and can't catch her breath. Diaphoretic in triage, requiring 2L O2, normally doesn't wear any at home       HISTORY OF PRESENT ILLNESS: 1 or more Elements   History received from: Patient    Patsy Kennedy is a 54 y.o. female who presents to the emergency department with chief complaint of shortness of breath.  Patient states shortness of breath been gradually worsening over the past few days.  States that started with some left shoulder pain as well as heaviness in her chest and then since then she has been increasingly short of breath.  States she has had a cough without productive sputum.  States that she does smoke daily but does not normally wear oxygen.  States that she was feeling more fatigued today and was getting very sweaty and was not able to catch her breath.  Has not any fevers or chills but when she came to the hospital was found be hypoxic and placed on 2 L nasal cannula.  Denies any lightheadedness or dizziness and denies any current chest pain, nausea, vomiting, abdominal pain, hematuria or dysuria, constipation or diarrhea.    Nursing Notes were all reviewed and agreed with or any disagreements were addressed in the HPI.    REVIEW OF SYSTEMS :    Positives and Pertinent negatives as per HPI.    PAST MEDICAL HISTORY/Chronic Conditions Affecting Care    has a past medical history of Chronic back pain, Class 1 obesity due to excess calories with body mass index (BMI) of 33.0 to 33.9 in adult, and Knee pain, bilateral.     SURGICAL HISTORY     Past Surgical History: 
25-Apr-2019 23:48

## 2025-05-29 PROBLEM — J18.9 COMMUNITY ACQUIRED PNEUMONIA, BILATERAL: Status: ACTIVE | Noted: 2025-05-29

## 2025-05-29 LAB
25(OH)D3 SERPL-MCNC: 21.2 NG/ML (ref 30–100)
ALBUMIN SERPL-MCNC: 3.6 G/DL (ref 3.5–5.2)
ALLEN TEST: POSITIVE
ALP SERPL-CCNC: 79 U/L (ref 35–104)
ALT SERPL-CCNC: 25 U/L (ref 0–32)
ANION GAP SERPL CALCULATED.3IONS-SCNC: 13 MMOL/L (ref 7–16)
AST SERPL-CCNC: 50 U/L (ref 0–31)
BACTERIA URNS QL MICRO: ABNORMAL
BASOPHILS # BLD: 0.04 K/UL (ref 0–0.2)
BASOPHILS NFR BLD: 0 % (ref 0–2)
BILIRUB SERPL-MCNC: 0.2 MG/DL (ref 0–1.2)
BILIRUB UR QL STRIP: NEGATIVE
BUN SERPL-MCNC: 5 MG/DL (ref 6–20)
CALCIUM SERPL-MCNC: 9 MG/DL (ref 8.6–10.2)
CHLORIDE SERPL-SCNC: 96 MMOL/L (ref 98–107)
CHOLEST SERPL-MCNC: 128 MG/DL
CLARITY UR: CLEAR
CO2 SERPL-SCNC: 23 MMOL/L (ref 22–29)
COLOR UR: YELLOW
CREAT SERPL-MCNC: 0.5 MG/DL (ref 0.5–1)
EOSINOPHIL # BLD: 0.01 K/UL (ref 0.05–0.5)
EOSINOPHILS RELATIVE PERCENT: 0 % (ref 0–6)
EPI CELLS #/AREA URNS HPF: ABNORMAL /HPF
ERYTHROCYTE [DISTWIDTH] IN BLOOD BY AUTOMATED COUNT: 11.4 % (ref 11.5–15)
FIO2: 60
FOLATE SERPL-MCNC: 9.1 NG/ML (ref 4.6–34.8)
GFR, ESTIMATED: >90 ML/MIN/1.73M2
GLUCOSE SERPL-MCNC: 174 MG/DL (ref 74–99)
GLUCOSE UR STRIP-MCNC: NEGATIVE MG/DL
HCT VFR BLD AUTO: 38.2 % (ref 34–48)
HDLC SERPL-MCNC: 48 MG/DL
HGB BLD-MCNC: 13.9 G/DL (ref 11.5–15.5)
HGB UR QL STRIP.AUTO: NEGATIVE
IMM GRANULOCYTES # BLD AUTO: 0.1 K/UL (ref 0–0.58)
IMM GRANULOCYTES NFR BLD: 1 % (ref 0–5)
IRON SATN MFR SERPL: 4 % (ref 15–50)
IRON SERPL-MCNC: 10 UG/DL (ref 37–145)
KETONES UR STRIP-MCNC: NEGATIVE MG/DL
LACTATE BLDV-SCNC: 1.5 MMOL/L (ref 0.5–2.2)
LDLC SERPL CALC-MCNC: 64 MG/DL
LEUKOCYTE ESTERASE UR QL STRIP: NEGATIVE
LYMPHOCYTES NFR BLD: 1.77 K/UL (ref 1.5–4)
LYMPHOCYTES RELATIVE PERCENT: 8 % (ref 20–42)
MAGNESIUM SERPL-MCNC: 1.6 MG/DL (ref 1.6–2.6)
MCH RBC QN AUTO: 34.9 PG (ref 26–35)
MCHC RBC AUTO-ENTMCNC: 36.4 G/DL (ref 32–34.5)
MCV RBC AUTO: 96 FL (ref 80–99.9)
MONOCYTES NFR BLD: 0.85 K/UL (ref 0.1–0.95)
MONOCYTES NFR BLD: 4 % (ref 2–12)
NEUTROPHILS NFR BLD: 87 % (ref 43–80)
NEUTS SEG NFR BLD: 18.94 K/UL (ref 1.8–7.3)
NITRITE UR QL STRIP: NEGATIVE
O2 DELIVERY DEVICE: ABNORMAL
PH UR STRIP: 6 [PH] (ref 5–8)
PHOSPHATE SERPL-MCNC: 2.7 MG/DL (ref 2.5–4.5)
PLATELET # BLD AUTO: 203 K/UL (ref 130–450)
PMV BLD AUTO: 9.7 FL (ref 7–12)
POC HCO3: 23.2 MMOL/L (ref 22–26)
POC O2 SATURATION: 93.8 % (ref 92–98.5)
POC PCO2: 29.8 MM HG (ref 35–45)
POC PH: 7.5 (ref 7.35–7.45)
POC PO2: 62.6 MM HG (ref 80–100)
POSITIVE BASE EXCESS, ART: 0.9 MMOL/L (ref 0–3)
POTASSIUM SERPL-SCNC: 3.9 MMOL/L (ref 3.5–5)
PROT SERPL-MCNC: 7 G/DL (ref 6.4–8.3)
PROT UR STRIP-MCNC: NEGATIVE MG/DL
RBC # BLD AUTO: 3.98 M/UL (ref 3.5–5.5)
RBC #/AREA URNS HPF: ABNORMAL /HPF
SAMPLE SITE: ABNORMAL
SODIUM SERPL-SCNC: 132 MMOL/L (ref 132–146)
SP GR UR STRIP: <1.005 (ref 1–1.03)
T4 FREE SERPL-MCNC: 1 NG/DL (ref 0.9–1.7)
TIBC SERPL-MCNC: 263 UG/DL (ref 250–450)
TRIGL SERPL-MCNC: 79 MG/DL
TROPONIN I SERPL HS-MCNC: <6 NG/L (ref 0–14)
TSH SERPL DL<=0.05 MIU/L-ACNC: 0.34 UIU/ML (ref 0.27–4.2)
UROBILINOGEN UR STRIP-ACNC: 0.2 EU/DL (ref 0–1)
VIT B12 SERPL-MCNC: 973 PG/ML (ref 232–1245)
VLDLC SERPL CALC-MCNC: 16 MG/DL
WBC #/AREA URNS HPF: ABNORMAL /HPF
WBC OTHER # BLD: 21.7 K/UL (ref 4.5–11.5)

## 2025-05-29 PROCEDURE — 87449 NOS EACH ORGANISM AG IA: CPT

## 2025-05-29 PROCEDURE — 80053 COMPREHEN METABOLIC PANEL: CPT

## 2025-05-29 PROCEDURE — 2580000003 HC RX 258

## 2025-05-29 PROCEDURE — 87081 CULTURE SCREEN ONLY: CPT

## 2025-05-29 PROCEDURE — 94669 MECHANICAL CHEST WALL OSCILL: CPT

## 2025-05-29 PROCEDURE — 87040 BLOOD CULTURE FOR BACTERIA: CPT

## 2025-05-29 PROCEDURE — 2500000003 HC RX 250 WO HCPCS

## 2025-05-29 PROCEDURE — 80061 LIPID PANEL: CPT

## 2025-05-29 PROCEDURE — 93005 ELECTROCARDIOGRAM TRACING: CPT

## 2025-05-29 PROCEDURE — 6370000000 HC RX 637 (ALT 250 FOR IP)

## 2025-05-29 PROCEDURE — 82746 ASSAY OF FOLIC ACID SERUM: CPT

## 2025-05-29 PROCEDURE — 84145 PROCALCITONIN (PCT): CPT

## 2025-05-29 PROCEDURE — 83036 HEMOGLOBIN GLYCOSYLATED A1C: CPT

## 2025-05-29 PROCEDURE — 85025 COMPLETE CBC W/AUTO DIFF WBC: CPT

## 2025-05-29 PROCEDURE — 84484 ASSAY OF TROPONIN QUANT: CPT

## 2025-05-29 PROCEDURE — 84443 ASSAY THYROID STIM HORMONE: CPT

## 2025-05-29 PROCEDURE — 81001 URINALYSIS AUTO W/SCOPE: CPT

## 2025-05-29 PROCEDURE — 6360000002 HC RX W HCPCS

## 2025-05-29 PROCEDURE — 82306 VITAMIN D 25 HYDROXY: CPT

## 2025-05-29 PROCEDURE — 99291 CRITICAL CARE FIRST HOUR: CPT

## 2025-05-29 PROCEDURE — 2000000000 HC ICU R&B

## 2025-05-29 PROCEDURE — 83735 ASSAY OF MAGNESIUM: CPT

## 2025-05-29 PROCEDURE — 2700000000 HC OXYGEN THERAPY PER DAY

## 2025-05-29 PROCEDURE — 94640 AIRWAY INHALATION TREATMENT: CPT

## 2025-05-29 PROCEDURE — 84100 ASSAY OF PHOSPHORUS: CPT

## 2025-05-29 PROCEDURE — 84439 ASSAY OF FREE THYROXINE: CPT

## 2025-05-29 PROCEDURE — 83550 IRON BINDING TEST: CPT

## 2025-05-29 PROCEDURE — 87086 URINE CULTURE/COLONY COUNT: CPT

## 2025-05-29 PROCEDURE — 83605 ASSAY OF LACTIC ACID: CPT

## 2025-05-29 PROCEDURE — 82607 VITAMIN B-12: CPT

## 2025-05-29 PROCEDURE — 83540 ASSAY OF IRON: CPT

## 2025-05-29 PROCEDURE — 87899 AGENT NOS ASSAY W/OPTIC: CPT

## 2025-05-29 PROCEDURE — 82803 BLOOD GASES ANY COMBINATION: CPT

## 2025-05-29 RX ORDER — DEXTROSE MONOHYDRATE 100 MG/ML
INJECTION, SOLUTION INTRAVENOUS CONTINUOUS PRN
Status: DISCONTINUED | OUTPATIENT
Start: 2025-05-29 | End: 2025-06-03 | Stop reason: HOSPADM

## 2025-05-29 RX ORDER — GLUCAGON 1 MG/ML
1 KIT INJECTION PRN
Status: DISCONTINUED | OUTPATIENT
Start: 2025-05-29 | End: 2025-06-03 | Stop reason: HOSPADM

## 2025-05-29 RX ADMIN — ARFORMOTEROL TARTRATE 15 MCG: 15 SOLUTION RESPIRATORY (INHALATION) at 04:31

## 2025-05-29 RX ADMIN — ALBUTEROL SULFATE 2.5 MG: 2.5 SOLUTION RESPIRATORY (INHALATION) at 06:34

## 2025-05-29 RX ADMIN — METHYLPREDNISOLONE SODIUM SUCCINATE 60 MG: 125 INJECTION INTRAMUSCULAR; INTRAVENOUS at 20:52

## 2025-05-29 RX ADMIN — ACETAMINOPHEN 650 MG: 325 TABLET ORAL at 06:07

## 2025-05-29 RX ADMIN — PANTOPRAZOLE SODIUM 40 MG: 40 TABLET, DELAYED RELEASE ORAL at 10:05

## 2025-05-29 RX ADMIN — ENOXAPARIN SODIUM 40 MG: 100 INJECTION SUBCUTANEOUS at 10:07

## 2025-05-29 RX ADMIN — IPRATROPIUM BROMIDE AND ALBUTEROL SULFATE 1 DOSE: .5; 2.5 SOLUTION RESPIRATORY (INHALATION) at 04:31

## 2025-05-29 RX ADMIN — IPRATROPIUM BROMIDE AND ALBUTEROL SULFATE 1 DOSE: .5; 2.5 SOLUTION RESPIRATORY (INHALATION) at 09:26

## 2025-05-29 RX ADMIN — IPRATROPIUM BROMIDE AND ALBUTEROL SULFATE 1 DOSE: .5; 2.5 SOLUTION RESPIRATORY (INHALATION) at 17:56

## 2025-05-29 RX ADMIN — BUDESONIDE 500 MCG: 0.5 SUSPENSION RESPIRATORY (INHALATION) at 04:31

## 2025-05-29 RX ADMIN — CEFTRIAXONE SODIUM 1000 MG: 1 INJECTION, POWDER, FOR SOLUTION INTRAMUSCULAR; INTRAVENOUS at 22:39

## 2025-05-29 RX ADMIN — AZITHROMYCIN DIHYDRATE 500 MG: 500 INJECTION, POWDER, LYOPHILIZED, FOR SOLUTION INTRAVENOUS at 21:02

## 2025-05-29 RX ADMIN — METOPROLOL SUCCINATE 100 MG: 25 TABLET, FILM COATED, EXTENDED RELEASE ORAL at 10:04

## 2025-05-29 RX ADMIN — ACETAMINOPHEN 650 MG: 325 TABLET ORAL at 18:09

## 2025-05-29 RX ADMIN — OXYCODONE AND ACETAMINOPHEN 1 TABLET: 10; 325 TABLET ORAL at 14:13

## 2025-05-29 RX ADMIN — IPRATROPIUM BROMIDE AND ALBUTEROL SULFATE 1 DOSE: .5; 2.5 SOLUTION RESPIRATORY (INHALATION) at 14:36

## 2025-05-29 RX ADMIN — BUDESONIDE 500 MCG: 0.5 SUSPENSION RESPIRATORY (INHALATION) at 17:56

## 2025-05-29 RX ADMIN — ARFORMOTEROL TARTRATE 15 MCG: 15 SOLUTION RESPIRATORY (INHALATION) at 17:56

## 2025-05-29 RX ADMIN — METHYLPREDNISOLONE SODIUM SUCCINATE 60 MG: 125 INJECTION INTRAMUSCULAR; INTRAVENOUS at 10:05

## 2025-05-29 ASSESSMENT — PAIN SCALES - GENERAL
PAINLEVEL_OUTOF10: 0
PAINLEVEL_OUTOF10: 8
PAINLEVEL_OUTOF10: 8
PAINLEVEL_OUTOF10: 0

## 2025-05-29 NOTE — ED NOTES
Patient had an SpO2 level of 84%. Patient was then placed on 4L NC with an SpO2 of 87%. Patient was upped to 6L at 89% SpO2. Dr. Urbano stated to put patient on a high flow nasal cannula at 10L, high flow nasal cannula obtained and placed on patient with an SpO2 of 91%. Respiratory called for a nebulizer treatment.

## 2025-05-29 NOTE — PLAN OF CARE
Problem: Discharge Planning  Goal: Discharge to home or other facility with appropriate resources  5/29/2025 1625 by Melody Gaines, RN  Outcome: Progressing     Problem: Knowledge deficit on diet management  Goal: Adequate nutrition  Description: Patient/caregiver will demonstrate adequate nutritional intake in pneumonia recovery within 14 day(s).  Outcome: Progressing     Problem: Knowledge deficit on pneumonia  Goal: Knowledge - disease process  Description: Patient/Caregiver will verbalize adequate knowledge of pneumonia as evidenced by statements of signs/symptoms, transmissions and causes, signs of exacerbation and actions to take within 2 day(s).  Outcome: Progressing     Problem: HH PHYSICAL DISCOMFORT-PNEUMONIA  Goal: *Control of Pain  Description: Patient will report that symptoms of physical discomfort have been reduced or controlled through verbal or nonverbal means and that measures to promote comfort are effective within 1 hour.  Outcome: Progressing     Problem: Respiratory - Adult  Goal: Achieves optimal ventilation and oxygenation  Outcome: Progressing

## 2025-05-29 NOTE — H&P
pain    Neurology:    Denies any headache or focal neurological deficits. No weakness or paresthesia.    Derm:    Denies any rashes, ulcers, or excoriations.  Denies bruising.      Extremities:   Denies any lower extremity swelling or edema.      PHYSICAL EXAM:  VITALS:  Vitals:    05/28/25 1808   BP:    Pulse: 97   Resp: 20   Temp:    SpO2: 90%         CONSTITUTIONAL:    Awake, alert, cooperative, no apparent distress, and appears stated age    EYES:    PERRL, EOMI, sclera clear, conjunctiva normal    ENT:    Normocephalic, atraumatic, sinuses nontender on palpation. External ears without lesions. Oral pharynx with moist mucus membranes.  Tonsils without erythema or exudates.    NECK:    Supple, symmetrical, trachea midline, no adenopathy, thyroid symmetric, not enlarged and no tenderness, skin normal, no bruits, no JVD    HEMATOLOGIC/LYMPHATICS:    No cervical lymphadenopathy and no supraclavicular lymphadenopathy    LUNGS:    Symmetric. No increased work of breathing, good air exchange, rhonchi to auscultation bilaterally, no wheezes or rales, on room air    CARDIOVASCULAR:    Normal apical impulse, tachycardic rate and rhythm, normal S1 and S2, no S3 or S4, and no murmur noted    ABDOMEN:    Normal bowel sounds, soft, non-distended, non-tender, no masses palpated, no hepatosplenomegaly, no rebound or guarding elicited on palpation     MUSCULOSKELETAL:    There is no redness, warmth, or swelling of the joints.  Full range of motion noted.  Motor strength is 5 out of 5 all extremities bilaterally.  Tone is normal.    NEUROLOGIC:    Awake, alert, oriented to name, place and time.  Cranial nerves II-XII are grossly intact.  Motor is 5 out of 5 bilaterally.      SKIN:    No bruising or bleeding.  No redness, warmth, or swelling    EXTREMITIES:    Peripheral pulses present.  No edema, cyanosis, or swelling.    LABS::  Lab Results   Component Value Date    WBC 9.3 05/28/2025    HGB 14.9 05/28/2025    HCT 41.6

## 2025-05-29 NOTE — ED NOTES
Dr. Moreland notified of patient pulse ox, new order to consult Dr. Landeros, patient level of care upgraded to ICU.

## 2025-05-30 ENCOUNTER — APPOINTMENT (OUTPATIENT)
Dept: CT IMAGING | Age: 55
DRG: 193 | End: 2025-05-30
Payer: COMMERCIAL

## 2025-05-30 ENCOUNTER — APPOINTMENT (OUTPATIENT)
Age: 55
DRG: 193 | End: 2025-05-30
Payer: COMMERCIAL

## 2025-05-30 LAB
ALBUMIN SERPL-MCNC: 3.2 G/DL (ref 3.5–5.2)
ALP SERPL-CCNC: 79 U/L (ref 35–104)
ALT SERPL-CCNC: 24 U/L (ref 0–32)
ANION GAP SERPL CALCULATED.3IONS-SCNC: 11 MMOL/L (ref 7–16)
AST SERPL-CCNC: 63 U/L (ref 0–31)
BASOPHILS # BLD: 0 K/UL (ref 0–0.2)
BASOPHILS NFR BLD: 0 % (ref 0–2)
BILIRUB SERPL-MCNC: 0.2 MG/DL (ref 0–1.2)
BUN SERPL-MCNC: 7 MG/DL (ref 6–20)
CALCIUM SERPL-MCNC: 8.9 MG/DL (ref 8.6–10.2)
CHLORIDE SERPL-SCNC: 96 MMOL/L (ref 98–107)
CO2 SERPL-SCNC: 23 MMOL/L (ref 22–29)
CREAT SERPL-MCNC: 0.5 MG/DL (ref 0.5–1)
CRP SERPL HS-MCNC: 36.9 MG/L (ref 0–5)
ECHO AO ASC DIAM: 2.8 CM
ECHO AO ASCENDING AORTA INDEX: 1.56 CM/M2
ECHO AR MAX VEL PISA: 4.3 M/S
ECHO AV AREA PEAK VELOCITY: 2.9 CM2
ECHO AV AREA VTI: 3.2 CM2
ECHO AV AREA/BSA PEAK VELOCITY: 1.6 CM2/M2
ECHO AV AREA/BSA VTI: 1.8 CM2/M2
ECHO AV CUSP MM: 1.9 CM
ECHO AV MEAN GRADIENT: 3 MMHG
ECHO AV MEAN VELOCITY: 0.9 M/S
ECHO AV PEAK GRADIENT: 7 MMHG
ECHO AV PEAK VELOCITY: 1.4 M/S
ECHO AV REGURGITANT PHT: 391.9 MS
ECHO AV VELOCITY RATIO: 0.86
ECHO AV VTI: 30.1 CM
ECHO BSA: 1.85 M2
ECHO EST RA PRESSURE: 3 MMHG
ECHO LA DIAMETER INDEX: 1.83 CM/M2
ECHO LA DIAMETER: 3.3 CM
ECHO LA VOL A-L A2C: 50 ML (ref 22–52)
ECHO LA VOL A-L A4C: 36 ML (ref 22–52)
ECHO LA VOL BP: 41 ML (ref 22–52)
ECHO LA VOL MOD A2C: 47 ML (ref 22–52)
ECHO LA VOL MOD A4C: 35 ML (ref 22–52)
ECHO LA VOL/BSA BIPLANE: 23 ML/M2 (ref 16–34)
ECHO LA VOLUME AREA LENGTH: 45 ML
ECHO LA VOLUME INDEX A-L A2C: 28 ML/M2 (ref 16–34)
ECHO LA VOLUME INDEX A-L A4C: 20 ML/M2 (ref 16–34)
ECHO LA VOLUME INDEX AREA LENGTH: 25 ML/M2 (ref 16–34)
ECHO LA VOLUME INDEX MOD A2C: 26 ML/M2 (ref 16–34)
ECHO LA VOLUME INDEX MOD A4C: 19 ML/M2 (ref 16–34)
ECHO LV EDV A2C: 111 ML
ECHO LV EDV A4C: 94 ML
ECHO LV EDV BP: 103 ML (ref 56–104)
ECHO LV EDV INDEX A4C: 52 ML/M2
ECHO LV EDV INDEX BP: 57 ML/M2
ECHO LV EDV NDEX A2C: 62 ML/M2
ECHO LV EF PHYSICIAN: 63 %
ECHO LV EJECTION FRACTION A2C: 65 %
ECHO LV EJECTION FRACTION A4C: 61 %
ECHO LV EJECTION FRACTION BIPLANE: 63 % (ref 55–100)
ECHO LV ESV A2C: 39 ML
ECHO LV ESV A4C: 37 ML
ECHO LV ESV BP: 38 ML (ref 19–49)
ECHO LV ESV INDEX A2C: 22 ML/M2
ECHO LV ESV INDEX A4C: 21 ML/M2
ECHO LV ESV INDEX BP: 21 ML/M2
ECHO LV FRACTIONAL SHORTENING: 25 % (ref 28–44)
ECHO LV INTERNAL DIMENSION DIASTOLE INDEX: 2.22 CM/M2
ECHO LV INTERNAL DIMENSION DIASTOLIC: 4 CM (ref 3.9–5.3)
ECHO LV INTERNAL DIMENSION SYSTOLIC INDEX: 1.67 CM/M2
ECHO LV INTERNAL DIMENSION SYSTOLIC: 3 CM
ECHO LV ISOVOLUMETRIC RELAXATION TIME (IVRT): 144.6 MS
ECHO LV IVSD: 1.1 CM (ref 0.6–0.9)
ECHO LV MASS 2D: 136.2 G (ref 67–162)
ECHO LV MASS INDEX 2D: 75.7 G/M2 (ref 43–95)
ECHO LV POSTERIOR WALL DIASTOLIC: 1 CM (ref 0.6–0.9)
ECHO LV RELATIVE WALL THICKNESS RATIO: 0.5
ECHO LVOT AREA: 3.5 CM2
ECHO LVOT AV VTI INDEX: 0.95
ECHO LVOT DIAM: 2.1 CM
ECHO LVOT MEAN GRADIENT: 2 MMHG
ECHO LVOT PEAK GRADIENT: 5 MMHG
ECHO LVOT PEAK VELOCITY: 1.2 M/S
ECHO LVOT STROKE VOLUME INDEX: 55 ML/M2
ECHO LVOT SV: 99 ML
ECHO LVOT VTI: 28.6 CM
ECHO MV A VELOCITY: 1.12 M/S
ECHO MV AREA PHT: 3.6 CM2
ECHO MV AREA VTI: 2.8 CM2
ECHO MV E DECELERATION TIME (DT): 199.9 MS
ECHO MV E VELOCITY: 1.06 M/S
ECHO MV E/A RATIO: 0.95
ECHO MV LVOT VTI INDEX: 1.23
ECHO MV MAX VELOCITY: 1.3 M/S
ECHO MV MEAN GRADIENT: 3 MMHG
ECHO MV MEAN VELOCITY: 0.7 M/S
ECHO MV PEAK GRADIENT: 6 MMHG
ECHO MV PRESSURE HALF TIME (PHT): 60.8 MS
ECHO MV VTI: 35.1 CM
ECHO PV MAX VELOCITY: 1 M/S
ECHO PV MEAN GRADIENT: 2 MMHG
ECHO PV MEAN VELOCITY: 0.7 M/S
ECHO PV PEAK GRADIENT: 4 MMHG
ECHO PV VTI: 21.9 CM
ECHO PVEIN A DURATION: 129.4 MS
ECHO PVEIN A VELOCITY: 0.3 M/S
ECHO PVEIN PEAK D VELOCITY: 0.7 M/S
ECHO PVEIN PEAK S VELOCITY: 1 M/S
ECHO PVEIN S/D RATIO: 1.4
ECHO RIGHT VENTRICULAR SYSTOLIC PRESSURE (RVSP): 19 MMHG
ECHO RV INTERNAL DIMENSION: 3.3 CM
ECHO RV LONGITUDINAL DIMENSION: 7.9 CM
ECHO RV MID DIMENSION: 2.5 CM
ECHO TV REGURGITANT MAX VELOCITY: 2 M/S
ECHO TV REGURGITANT PEAK GRADIENT: 16 MMHG
EOSINOPHIL # BLD: 0 K/UL (ref 0.05–0.5)
EOSINOPHILS RELATIVE PERCENT: 0 % (ref 0–6)
ERYTHROCYTE [DISTWIDTH] IN BLOOD BY AUTOMATED COUNT: 11.4 % (ref 11.5–15)
ERYTHROCYTE [SEDIMENTATION RATE] IN BLOOD BY WESTERGREN METHOD: 30 MM/HR (ref 0–20)
GFR, ESTIMATED: >90 ML/MIN/1.73M2
GLUCOSE SERPL-MCNC: 136 MG/DL (ref 74–99)
HBA1C MFR BLD: 5.3 % (ref 4–5.6)
HCT VFR BLD AUTO: 37.1 % (ref 34–48)
HGB BLD-MCNC: 13.2 G/DL (ref 11.5–15.5)
L PNEUMO1 AG UR QL IA.RAPID: NEGATIVE
LYMPHOCYTES NFR BLD: 1.05 K/UL (ref 1.5–4)
LYMPHOCYTES RELATIVE PERCENT: 4 % (ref 20–42)
MAGNESIUM SERPL-MCNC: 2 MG/DL (ref 1.6–2.6)
MCH RBC QN AUTO: 34.4 PG (ref 26–35)
MCHC RBC AUTO-ENTMCNC: 35.6 G/DL (ref 32–34.5)
MCV RBC AUTO: 96.6 FL (ref 80–99.9)
MONOCYTES NFR BLD: 0 % (ref 2–12)
MONOCYTES NFR BLD: 0 K/UL (ref 0.1–0.95)
NEUTROPHILS NFR BLD: 96 % (ref 43–80)
NEUTS SEG NFR BLD: 23.05 K/UL (ref 1.8–7.3)
PHOSPHATE SERPL-MCNC: 3.3 MG/DL (ref 2.5–4.5)
PLATELET # BLD AUTO: 211 K/UL (ref 130–450)
PMV BLD AUTO: 10.3 FL (ref 7–12)
POTASSIUM SERPL-SCNC: 4.3 MMOL/L (ref 3.5–5)
PROCALCITONIN SERPL-MCNC: 0.1 NG/ML (ref 0–0.08)
PROT SERPL-MCNC: 6.8 G/DL (ref 6.4–8.3)
RBC # BLD AUTO: 3.84 M/UL (ref 3.5–5.5)
RBC # BLD: ABNORMAL 10*6/UL
S PNEUM AG SPEC QL: NEGATIVE
SODIUM SERPL-SCNC: 130 MMOL/L (ref 132–146)
SPECIMEN SOURCE: NORMAL
WBC OTHER # BLD: 24.1 K/UL (ref 4.5–11.5)

## 2025-05-30 PROCEDURE — 93306 TTE W/DOPPLER COMPLETE: CPT | Performed by: INTERNAL MEDICINE

## 2025-05-30 PROCEDURE — 94669 MECHANICAL CHEST WALL OSCILL: CPT

## 2025-05-30 PROCEDURE — 86331 IMMUNODIFFUSION OUCHTERLONY: CPT

## 2025-05-30 PROCEDURE — 82785 ASSAY OF IGE: CPT

## 2025-05-30 PROCEDURE — 99291 CRITICAL CARE FIRST HOUR: CPT | Performed by: INTERNAL MEDICINE

## 2025-05-30 PROCEDURE — 84100 ASSAY OF PHOSPHORUS: CPT

## 2025-05-30 PROCEDURE — 2580000003 HC RX 258: Performed by: INTERNAL MEDICINE

## 2025-05-30 PROCEDURE — 86606 ASPERGILLUS ANTIBODY: CPT

## 2025-05-30 PROCEDURE — 2500000003 HC RX 250 WO HCPCS

## 2025-05-30 PROCEDURE — 6360000004 HC RX CONTRAST MEDICATION: Performed by: RADIOLOGY

## 2025-05-30 PROCEDURE — 6360000002 HC RX W HCPCS

## 2025-05-30 PROCEDURE — 2000000000 HC ICU R&B

## 2025-05-30 PROCEDURE — 85652 RBC SED RATE AUTOMATED: CPT

## 2025-05-30 PROCEDURE — 2580000003 HC RX 258

## 2025-05-30 PROCEDURE — 80053 COMPREHEN METABOLIC PANEL: CPT

## 2025-05-30 PROCEDURE — 83735 ASSAY OF MAGNESIUM: CPT

## 2025-05-30 PROCEDURE — 71275 CT ANGIOGRAPHY CHEST: CPT

## 2025-05-30 PROCEDURE — 86140 C-REACTIVE PROTEIN: CPT

## 2025-05-30 PROCEDURE — 2700000000 HC OXYGEN THERAPY PER DAY

## 2025-05-30 PROCEDURE — 6370000000 HC RX 637 (ALT 250 FOR IP)

## 2025-05-30 PROCEDURE — 93306 TTE W/DOPPLER COMPLETE: CPT

## 2025-05-30 PROCEDURE — 85025 COMPLETE CBC W/AUTO DIFF WBC: CPT

## 2025-05-30 PROCEDURE — 94640 AIRWAY INHALATION TREATMENT: CPT

## 2025-05-30 RX ORDER — SODIUM CHLORIDE FOR INHALATION 3 %
4 VIAL, NEBULIZER (ML) INHALATION 2 TIMES DAILY
Status: DISCONTINUED | OUTPATIENT
Start: 2025-05-30 | End: 2025-06-03 | Stop reason: HOSPADM

## 2025-05-30 RX ORDER — HYDRALAZINE HYDROCHLORIDE 20 MG/ML
10 INJECTION INTRAMUSCULAR; INTRAVENOUS EVERY 4 HOURS PRN
Status: DISCONTINUED | OUTPATIENT
Start: 2025-05-30 | End: 2025-06-03 | Stop reason: HOSPADM

## 2025-05-30 RX ORDER — NICOTINE 21 MG/24HR
1 PATCH, TRANSDERMAL 24 HOURS TRANSDERMAL DAILY
Status: DISCONTINUED | OUTPATIENT
Start: 2025-05-30 | End: 2025-06-03 | Stop reason: HOSPADM

## 2025-05-30 RX ORDER — IOPAMIDOL 755 MG/ML
75 INJECTION, SOLUTION INTRAVASCULAR
Status: COMPLETED | OUTPATIENT
Start: 2025-05-30 | End: 2025-05-30

## 2025-05-30 RX ADMIN — ARFORMOTEROL TARTRATE 15 MCG: 15 SOLUTION RESPIRATORY (INHALATION) at 17:30

## 2025-05-30 RX ADMIN — BUDESONIDE 500 MCG: 0.5 SUSPENSION RESPIRATORY (INHALATION) at 17:30

## 2025-05-30 RX ADMIN — ENOXAPARIN SODIUM 40 MG: 100 INJECTION SUBCUTANEOUS at 08:24

## 2025-05-30 RX ADMIN — HYDRALAZINE HYDROCHLORIDE 10 MG: 20 INJECTION INTRAMUSCULAR; INTRAVENOUS at 04:38

## 2025-05-30 RX ADMIN — AZITHROMYCIN DIHYDRATE 500 MG: 500 INJECTION, POWDER, LYOPHILIZED, FOR SOLUTION INTRAVENOUS at 20:33

## 2025-05-30 RX ADMIN — METHYLPREDNISOLONE SODIUM SUCCINATE 60 MG: 125 INJECTION INTRAMUSCULAR; INTRAVENOUS at 20:32

## 2025-05-30 RX ADMIN — PANTOPRAZOLE SODIUM 40 MG: 40 TABLET, DELAYED RELEASE ORAL at 06:52

## 2025-05-30 RX ADMIN — BUDESONIDE 500 MCG: 0.5 SUSPENSION RESPIRATORY (INHALATION) at 04:51

## 2025-05-30 RX ADMIN — CEFTRIAXONE SODIUM 1000 MG: 1 INJECTION, POWDER, FOR SOLUTION INTRAMUSCULAR; INTRAVENOUS at 22:55

## 2025-05-30 RX ADMIN — OXYCODONE AND ACETAMINOPHEN 1 TABLET: 10; 325 TABLET ORAL at 06:56

## 2025-05-30 RX ADMIN — HYDRALAZINE HYDROCHLORIDE 10 MG: 20 INJECTION INTRAMUSCULAR; INTRAVENOUS at 20:51

## 2025-05-30 RX ADMIN — IOPAMIDOL 75 ML: 755 INJECTION, SOLUTION INTRAVENOUS at 08:07

## 2025-05-30 RX ADMIN — Medication 4 ML: at 17:30

## 2025-05-30 RX ADMIN — METOPROLOL SUCCINATE 100 MG: 25 TABLET, FILM COATED, EXTENDED RELEASE ORAL at 08:23

## 2025-05-30 RX ADMIN — ACETAMINOPHEN 650 MG: 325 TABLET ORAL at 08:23

## 2025-05-30 RX ADMIN — ARFORMOTEROL TARTRATE 15 MCG: 15 SOLUTION RESPIRATORY (INHALATION) at 04:51

## 2025-05-30 RX ADMIN — METHYLPREDNISOLONE SODIUM SUCCINATE 60 MG: 125 INJECTION INTRAMUSCULAR; INTRAVENOUS at 08:23

## 2025-05-30 ASSESSMENT — PAIN SCALES - GENERAL
PAINLEVEL_OUTOF10: 0
PAINLEVEL_OUTOF10: 0
PAINLEVEL_OUTOF10: 7
PAINLEVEL_OUTOF10: 0

## 2025-05-30 ASSESSMENT — PAIN DESCRIPTION - LOCATION: LOCATION: NECK;BACK

## 2025-05-30 NOTE — PLAN OF CARE
Problem: Discharge Planning  Goal: Discharge to home or other facility with appropriate resources  5/30/2025 0120 by Shamar Yee RN  Outcome: Progressing  Flowsheets (Taken 5/29/2025 2000)  Discharge to home or other facility with appropriate resources: Identify barriers to discharge with patient and caregiver  5/29/2025 1625 by Melody Gaines RN  Outcome: Progressing  5/29/2025 1620 by Melody Gaines RN  Outcome: Progressing  5/29/2025 1619 by Melody Gaines RN  Outcome: Progressing     Problem: Knowledge deficit on diet management  Goal: Adequate nutrition  Description: Patient/caregiver will demonstrate adequate nutritional intake in pneumonia recovery within 14 day(s).  5/30/2025 0120 by Shamar Yee RN  Outcome: Progressing  5/29/2025 1625 by Melody Gaines RN  Outcome: Progressing     Problem: Knowledge deficit on pneumonia  Goal: Knowledge - disease process  Description: Patient/Caregiver will verbalize adequate knowledge of pneumonia as evidenced by statements of signs/symptoms, transmissions and causes, signs of exacerbation and actions to take within 2 day(s).  5/30/2025 0120 by Shamar Yee RN  Outcome: Progressing  5/29/2025 1625 by Melody Gaines RN  Outcome: Progressing     Problem: HH PHYSICAL DISCOMFORT-PNEUMONIA  Goal: *Control of Pain  Description: Patient will report that symptoms of physical discomfort have been reduced or controlled through verbal or nonverbal means and that measures to promote comfort are effective within 1 hour.  5/30/2025 0120 by Shamar Yee RN  Outcome: Progressing  5/29/2025 1625 by Melody Gaines RN  Outcome: Progressing     Problem: Respiratory - Adult  Goal: Achieves optimal ventilation and oxygenation  5/30/2025 0120 by Shamar Yee RN  Outcome: Progressing  5/29/2025 1625 by Melody Gaines RN  Outcome: Progressing

## 2025-05-30 NOTE — PLAN OF CARE
Problem: Discharge Planning  Goal: Discharge to home or other facility with appropriate resources  5/30/2025 0938 by Melody Gaines, RN  Outcome: Progressing     Problem: Knowledge deficit on diet management  Goal: Adequate nutrition  Description: Patient/caregiver will demonstrate adequate nutritional intake in pneumonia recovery within 14 day(s).  5/30/2025 0938 by Melody Gaines, RN  Outcome: Progressing     Problem: Knowledge deficit on pneumonia  Goal: Knowledge - disease process  Description: Patient/Caregiver will verbalize adequate knowledge of pneumonia as evidenced by statements of signs/symptoms, transmissions and causes, signs of exacerbation and actions to take within 2 day(s).  5/30/2025 0938 by Melody Gaines, RN  Outcome: Progressing     Problem: HH PHYSICAL DISCOMFORT-PNEUMONIA  Goal: *Control of Pain  Description: Patient will report that symptoms of physical discomfort have been reduced or controlled through verbal or nonverbal means and that measures to promote comfort are effective within 1 hour.  5/30/2025 0938 by Melody Gaines, RN  Outcome: Progressing     Problem: Respiratory - Adult  Goal: Achieves optimal ventilation and oxygenation  5/30/2025 0938 by Melody Gaines, RN  Outcome: Progressing

## 2025-05-30 NOTE — ACP (ADVANCE CARE PLANNING)
Advance Care Planning   Healthcare Decision Maker:    Primary Decision Maker: Santo Kennedy - Spouse - [phone]    .  Today we documented Decision Maker(s) consistent with Legal Next of Kin hierarchy.

## 2025-05-30 NOTE — CONSULTS
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 female with the following medical Problems: Chronic back pain, HTN.  Patient presented to the ED for shortness of breath, wasn't feeling well, diaphoretic in triage, and hypoxic in triage placed on 2L/NC normally doesn't wear any O2. States that started with some left shoulder pain as well as heaviness in her chest and then since then she has been increasingly short of breath. States she has had a cough without productive sputum. States she is feeling more fatigued today.  She does admitted to smoking daily.    ED workup:  Na 124, K 3.5, Cl 90, Co2 19, BUN 4, Creat 0.5, Anion Gap 15, PH 7.499, PCO2 29.8, PO2 62.6, HCO3 23.2, AST 46>50, glucose 216, WBC 21.7, HGB 13.9, HCT 38.2, + Human Thedford pneumovirus, Neutrophils 87, Chest X-Ray-Bilateral lower lobe opacities worrisome for pneumonia.     In the ED patient received: Duo Neb breathing treatments, Solumedrol, Azithromycin        Past Medical History:  Past Medical History:   Diagnosis Date    Chronic back pain     Class 1 obesity due to excess calories with body mass index (BMI) of 33.0 to 33.9 in adult     Knee pain, bilateral     left knee pain      Past Surgical History:   Procedure Laterality Date

## 2025-05-31 LAB
ALBUMIN SERPL-MCNC: 3 G/DL (ref 3.5–5.2)
ALP SERPL-CCNC: 75 U/L (ref 35–104)
ALT SERPL-CCNC: 25 U/L (ref 0–32)
ANION GAP SERPL CALCULATED.3IONS-SCNC: 8 MMOL/L (ref 7–16)
AST SERPL-CCNC: 47 U/L (ref 0–31)
BASOPHILS # BLD: 0.04 K/UL (ref 0–0.2)
BASOPHILS NFR BLD: 0 % (ref 0–2)
BILIRUB SERPL-MCNC: 0.2 MG/DL (ref 0–1.2)
BUN SERPL-MCNC: 11 MG/DL (ref 6–20)
CALCIUM SERPL-MCNC: 8.9 MG/DL (ref 8.6–10.2)
CHLORIDE SERPL-SCNC: 96 MMOL/L (ref 98–107)
CO2 SERPL-SCNC: 24 MMOL/L (ref 22–29)
CREAT SERPL-MCNC: 0.6 MG/DL (ref 0.5–1)
EOSINOPHIL # BLD: 0.02 K/UL (ref 0.05–0.5)
EOSINOPHILS RELATIVE PERCENT: 0 % (ref 0–6)
ERYTHROCYTE [DISTWIDTH] IN BLOOD BY AUTOMATED COUNT: 11.9 % (ref 11.5–15)
GFR, ESTIMATED: >90 ML/MIN/1.73M2
GLUCOSE SERPL-MCNC: 132 MG/DL (ref 74–99)
HCT VFR BLD AUTO: 38.1 % (ref 34–48)
HGB BLD-MCNC: 13.4 G/DL (ref 11.5–15.5)
IMM GRANULOCYTES # BLD AUTO: 0.27 K/UL (ref 0–0.58)
IMM GRANULOCYTES NFR BLD: 1 % (ref 0–5)
LYMPHOCYTES NFR BLD: 1.79 K/UL (ref 1.5–4)
LYMPHOCYTES RELATIVE PERCENT: 9 % (ref 20–42)
MAGNESIUM SERPL-MCNC: 2.2 MG/DL (ref 1.6–2.6)
MCH RBC QN AUTO: 34.2 PG (ref 26–35)
MCHC RBC AUTO-ENTMCNC: 35.2 G/DL (ref 32–34.5)
MCV RBC AUTO: 97.2 FL (ref 80–99.9)
MICROORGANISM SPEC CULT: ABNORMAL
MICROORGANISM SPEC CULT: NORMAL
MONOCYTES NFR BLD: 0.53 K/UL (ref 0.1–0.95)
MONOCYTES NFR BLD: 3 % (ref 2–12)
NEUTROPHILS NFR BLD: 86 % (ref 43–80)
NEUTS SEG NFR BLD: 16.7 K/UL (ref 1.8–7.3)
PHOSPHATE SERPL-MCNC: 3.5 MG/DL (ref 2.5–4.5)
PLATELET # BLD AUTO: 255 K/UL (ref 130–450)
PMV BLD AUTO: 10.1 FL (ref 7–12)
POTASSIUM SERPL-SCNC: 4.3 MMOL/L (ref 3.5–5)
PROT SERPL-MCNC: 6.6 G/DL (ref 6.4–8.3)
RBC # BLD AUTO: 3.92 M/UL (ref 3.5–5.5)
SERVICE CMNT-IMP: ABNORMAL
SODIUM SERPL-SCNC: 128 MMOL/L (ref 132–146)
SPECIMEN DESCRIPTION: ABNORMAL
SPECIMEN DESCRIPTION: NORMAL
WBC OTHER # BLD: 19.4 K/UL (ref 4.5–11.5)

## 2025-05-31 PROCEDURE — 84100 ASSAY OF PHOSPHORUS: CPT

## 2025-05-31 PROCEDURE — 6360000002 HC RX W HCPCS

## 2025-05-31 PROCEDURE — 2580000003 HC RX 258: Performed by: INTERNAL MEDICINE

## 2025-05-31 PROCEDURE — 94640 AIRWAY INHALATION TREATMENT: CPT

## 2025-05-31 PROCEDURE — 80053 COMPREHEN METABOLIC PANEL: CPT

## 2025-05-31 PROCEDURE — 6370000000 HC RX 637 (ALT 250 FOR IP)

## 2025-05-31 PROCEDURE — 2500000003 HC RX 250 WO HCPCS

## 2025-05-31 PROCEDURE — 2700000000 HC OXYGEN THERAPY PER DAY

## 2025-05-31 PROCEDURE — 85025 COMPLETE CBC W/AUTO DIFF WBC: CPT

## 2025-05-31 PROCEDURE — 2580000003 HC RX 258

## 2025-05-31 PROCEDURE — 83735 ASSAY OF MAGNESIUM: CPT

## 2025-05-31 PROCEDURE — 2060000000 HC ICU INTERMEDIATE R&B

## 2025-05-31 RX ADMIN — IPRATROPIUM BROMIDE AND ALBUTEROL SULFATE 1 DOSE: .5; 2.5 SOLUTION RESPIRATORY (INHALATION) at 04:51

## 2025-05-31 RX ADMIN — BUDESONIDE 500 MCG: 0.5 SUSPENSION RESPIRATORY (INHALATION) at 18:37

## 2025-05-31 RX ADMIN — ARFORMOTEROL TARTRATE 15 MCG: 15 SOLUTION RESPIRATORY (INHALATION) at 04:51

## 2025-05-31 RX ADMIN — BUDESONIDE 500 MCG: 0.5 SUSPENSION RESPIRATORY (INHALATION) at 04:51

## 2025-05-31 RX ADMIN — ENOXAPARIN SODIUM 40 MG: 100 INJECTION SUBCUTANEOUS at 08:13

## 2025-05-31 RX ADMIN — ARFORMOTEROL TARTRATE 15 MCG: 15 SOLUTION RESPIRATORY (INHALATION) at 18:37

## 2025-05-31 RX ADMIN — METOPROLOL SUCCINATE 100 MG: 25 TABLET, FILM COATED, EXTENDED RELEASE ORAL at 08:13

## 2025-05-31 RX ADMIN — AZITHROMYCIN DIHYDRATE 500 MG: 500 INJECTION, POWDER, LYOPHILIZED, FOR SOLUTION INTRAVENOUS at 20:28

## 2025-05-31 RX ADMIN — ACETAMINOPHEN 650 MG: 325 TABLET ORAL at 09:59

## 2025-05-31 RX ADMIN — Medication 4 ML: at 18:37

## 2025-05-31 RX ADMIN — ACETAMINOPHEN 650 MG: 325 TABLET ORAL at 05:00

## 2025-05-31 RX ADMIN — PANTOPRAZOLE SODIUM 40 MG: 40 TABLET, DELAYED RELEASE ORAL at 06:53

## 2025-05-31 RX ADMIN — METHYLPREDNISOLONE SODIUM SUCCINATE 60 MG: 125 INJECTION INTRAMUSCULAR; INTRAVENOUS at 08:13

## 2025-05-31 RX ADMIN — Medication 4 ML: at 04:51

## 2025-05-31 RX ADMIN — OXYCODONE AND ACETAMINOPHEN 1 TABLET: 10; 325 TABLET ORAL at 15:25

## 2025-05-31 ASSESSMENT — PAIN SCALES - GENERAL
PAINLEVEL_OUTOF10: 0
PAINLEVEL_OUTOF10: 9
PAINLEVEL_OUTOF10: 0

## 2025-05-31 ASSESSMENT — PAIN DESCRIPTION - LOCATION
LOCATION: CHEST
LOCATION: HEAD

## 2025-05-31 ASSESSMENT — PAIN DESCRIPTION - DESCRIPTORS: DESCRIPTORS: ACHING

## 2025-05-31 NOTE — PLAN OF CARE
Problem: Discharge Planning  Goal: Discharge to home or other facility with appropriate resources  Outcome: Progressing  Flowsheets (Taken 5/30/2025 2000)  Discharge to home or other facility with appropriate resources: Identify barriers to discharge with patient and caregiver     Problem: Knowledge deficit on diet management  Goal: Adequate nutrition  Description: Patient/caregiver will demonstrate adequate nutritional intake in pneumonia recovery within 14 day(s).  Outcome: Progressing     Problem: Knowledge deficit on pneumonia  Goal: Knowledge - disease process  Description: Patient/Caregiver will verbalize adequate knowledge of pneumonia as evidenced by statements of signs/symptoms, transmissions and causes, signs of exacerbation and actions to take within 2 day(s).  Outcome: Progressing     Problem: HH PHYSICAL DISCOMFORT-PNEUMONIA  Goal: *Control of Pain  Description: Patient will report that symptoms of physical discomfort have been reduced or controlled through verbal or nonverbal means and that measures to promote comfort are effective within 1 hour.  Outcome: Progressing     Problem: Respiratory - Adult  Goal: Achieves optimal ventilation and oxygenation  Outcome: Progressing     Problem: Nutrition Deficit:  Goal: Optimize nutritional status  5/31/2025 0033 by Shamar Yee RN  Outcome: Progressing  5/30/2025 1159 by Nohemi Messer RD, LD  Outcome: Progressing  Flowsheets (Taken 5/30/2025 1159)  Nutrient intake appropriate for improving, restoring, or maintaining nutritional needs:   Assess nutritional status and recommend course of action   Monitor oral intake, labs, and treatment plans   Recommend appropriate diets, oral nutritional supplements, and vitamin/mineral supplements   Order, calculate, and assess calorie counts as needed

## 2025-05-31 NOTE — PLAN OF CARE
Problem: Discharge Planning  Goal: Discharge to home or other facility with appropriate resources  5/31/2025 0931 by Melody Gaines, RN  Outcome: Progressing     Problem: Knowledge deficit on diet management  Goal: Adequate nutrition  Description: Patient/caregiver will demonstrate adequate nutritional intake in pneumonia recovery within 14 day(s).  5/31/2025 0931 by Melody Gaines, RN  Outcome: Progressing     Problem: Knowledge deficit on pneumonia  Goal: Knowledge - disease process  Description: Patient/Caregiver will verbalize adequate knowledge of pneumonia as evidenced by statements of signs/symptoms, transmissions and causes, signs of exacerbation and actions to take within 2 day(s).  5/31/2025 0931 by Melody Gaines, RN  Outcome: Progressing     Problem: HH PHYSICAL DISCOMFORT-PNEUMONIA  Goal: *Control of Pain  Description: Patient will report that symptoms of physical discomfort have been reduced or controlled through verbal or nonverbal means and that measures to promote comfort are effective within 1 hour.  5/31/2025 0931 by Melody Gaines, RN  Outcome: Progressing     Problem: Respiratory - Adult  Goal: Achieves optimal ventilation and oxygenation  5/31/2025 0931 by Melody Gaines, RN  Outcome: Progressing     Problem: Nutrition Deficit:  Goal: Optimize nutritional status  5/31/2025 0931 by Melody Gaines, RN  Outcome: Progressing

## 2025-06-01 LAB
AMPHET UR QL SCN: NEGATIVE
BARBITURATES UR QL SCN: NEGATIVE
BENZODIAZ UR QL: NEGATIVE
BUPRENORPHINE UR QL: NEGATIVE
CANNABINOIDS UR QL SCN: NEGATIVE
COCAINE UR QL SCN: NEGATIVE
EKG ATRIAL RATE: 100 BPM
EKG ATRIAL RATE: 102 BPM
EKG P AXIS: 52 DEGREES
EKG P AXIS: 79 DEGREES
EKG P-R INTERVAL: 144 MS
EKG P-R INTERVAL: 148 MS
EKG Q-T INTERVAL: 350 MS
EKG Q-T INTERVAL: 352 MS
EKG QRS DURATION: 80 MS
EKG QRS DURATION: 84 MS
EKG QTC CALCULATION (BAZETT): 454 MS
EKG QTC CALCULATION (BAZETT): 456 MS
EKG R AXIS: 80 DEGREES
EKG R AXIS: 84 DEGREES
EKG T AXIS: 40 DEGREES
EKG T AXIS: 79 DEGREES
EKG VENTRICULAR RATE: 100 BPM
EKG VENTRICULAR RATE: 102 BPM
FENTANYL UR QL: NEGATIVE
IGE SERPL-ACNC: 95 IU/ML (ref 0–100)
METHADONE UR QL: NEGATIVE
OPIATES UR QL SCN: NEGATIVE
OXYCODONE UR QL SCN: POSITIVE
PCP UR QL SCN: NEGATIVE
TEST INFORMATION: ABNORMAL

## 2025-06-01 PROCEDURE — 94761 N-INVAS EAR/PLS OXIMETRY MLT: CPT

## 2025-06-01 PROCEDURE — 99407 BEHAV CHNG SMOKING > 10 MIN: CPT | Performed by: INTERNAL MEDICINE

## 2025-06-01 PROCEDURE — 6370000000 HC RX 637 (ALT 250 FOR IP)

## 2025-06-01 PROCEDURE — 2500000003 HC RX 250 WO HCPCS: Performed by: INTERNAL MEDICINE

## 2025-06-01 PROCEDURE — 2580000003 HC RX 258: Performed by: INTERNAL MEDICINE

## 2025-06-01 PROCEDURE — 6360000002 HC RX W HCPCS: Performed by: INTERNAL MEDICINE

## 2025-06-01 PROCEDURE — 6370000000 HC RX 637 (ALT 250 FOR IP): Performed by: INTERNAL MEDICINE

## 2025-06-01 PROCEDURE — 93010 ELECTROCARDIOGRAM REPORT: CPT | Performed by: INTERNAL MEDICINE

## 2025-06-01 PROCEDURE — 94640 AIRWAY INHALATION TREATMENT: CPT

## 2025-06-01 PROCEDURE — 2700000000 HC OXYGEN THERAPY PER DAY

## 2025-06-01 PROCEDURE — 99233 SBSQ HOSP IP/OBS HIGH 50: CPT | Performed by: INTERNAL MEDICINE

## 2025-06-01 PROCEDURE — 86738 MYCOPLASMA ANTIBODY: CPT

## 2025-06-01 PROCEDURE — 80307 DRUG TEST PRSMV CHEM ANLYZR: CPT

## 2025-06-01 PROCEDURE — 36415 COLL VENOUS BLD VENIPUNCTURE: CPT

## 2025-06-01 PROCEDURE — 2060000000 HC ICU INTERMEDIATE R&B

## 2025-06-01 PROCEDURE — 6360000002 HC RX W HCPCS

## 2025-06-01 PROCEDURE — 2500000003 HC RX 250 WO HCPCS

## 2025-06-01 RX ORDER — LEVOFLOXACIN 5 MG/ML
750 INJECTION, SOLUTION INTRAVENOUS EVERY 24 HOURS
Status: DISCONTINUED | OUTPATIENT
Start: 2025-06-01 | End: 2025-06-03 | Stop reason: HOSPADM

## 2025-06-01 RX ORDER — BUSPIRONE HYDROCHLORIDE 5 MG/1
5 TABLET ORAL 3 TIMES DAILY PRN
Status: DISCONTINUED | OUTPATIENT
Start: 2025-06-01 | End: 2025-06-03 | Stop reason: HOSPADM

## 2025-06-01 RX ORDER — ACETYLCYSTEINE 100 MG/ML
4 SOLUTION ORAL; RESPIRATORY (INHALATION) EVERY 4 HOURS
Status: DISCONTINUED | OUTPATIENT
Start: 2025-06-01 | End: 2025-06-02

## 2025-06-01 RX ORDER — IPRATROPIUM BROMIDE AND ALBUTEROL SULFATE 2.5; .5 MG/3ML; MG/3ML
1 SOLUTION RESPIRATORY (INHALATION)
Status: DISCONTINUED | OUTPATIENT
Start: 2025-06-01 | End: 2025-06-03 | Stop reason: HOSPADM

## 2025-06-01 RX ADMIN — IPRATROPIUM BROMIDE AND ALBUTEROL SULFATE 1 DOSE: .5; 2.5 SOLUTION RESPIRATORY (INHALATION) at 06:13

## 2025-06-01 RX ADMIN — IPRATROPIUM BROMIDE AND ALBUTEROL SULFATE 1 DOSE: .5; 2.5 SOLUTION RESPIRATORY (INHALATION) at 21:07

## 2025-06-01 RX ADMIN — ACETYLCYSTEINE 400 MG: 100 INHALANT RESPIRATORY (INHALATION) at 21:07

## 2025-06-01 RX ADMIN — BUDESONIDE 500 MCG: 0.5 SUSPENSION RESPIRATORY (INHALATION) at 17:38

## 2025-06-01 RX ADMIN — ENOXAPARIN SODIUM 40 MG: 100 INJECTION SUBCUTANEOUS at 10:04

## 2025-06-01 RX ADMIN — OXYCODONE AND ACETAMINOPHEN 1 TABLET: 10; 325 TABLET ORAL at 15:33

## 2025-06-01 RX ADMIN — ARFORMOTEROL TARTRATE 15 MCG: 15 SOLUTION RESPIRATORY (INHALATION) at 06:13

## 2025-06-01 RX ADMIN — METHYLPREDNISOLONE SODIUM SUCCINATE 40 MG: 40 INJECTION, POWDER, LYOPHILIZED, FOR SOLUTION INTRAMUSCULAR; INTRAVENOUS at 21:16

## 2025-06-01 RX ADMIN — OXYCODONE AND ACETAMINOPHEN 1 TABLET: 10; 325 TABLET ORAL at 05:47

## 2025-06-01 RX ADMIN — LEVOFLOXACIN 750 MG: 5 INJECTION, SOLUTION INTRAVENOUS at 17:05

## 2025-06-01 RX ADMIN — Medication 4 ML: at 06:13

## 2025-06-01 RX ADMIN — PANTOPRAZOLE SODIUM 40 MG: 40 TABLET, DELAYED RELEASE ORAL at 05:48

## 2025-06-01 RX ADMIN — METOPROLOL SUCCINATE 100 MG: 25 TABLET, FILM COATED, EXTENDED RELEASE ORAL at 09:59

## 2025-06-01 RX ADMIN — ARFORMOTEROL TARTRATE 15 MCG: 15 SOLUTION RESPIRATORY (INHALATION) at 17:38

## 2025-06-01 RX ADMIN — IPRATROPIUM BROMIDE AND ALBUTEROL SULFATE 1 DOSE: .5; 2.5 SOLUTION RESPIRATORY (INHALATION) at 17:38

## 2025-06-01 RX ADMIN — BUDESONIDE 500 MCG: 0.5 SUSPENSION RESPIRATORY (INHALATION) at 06:13

## 2025-06-01 RX ADMIN — ACETAMINOPHEN 650 MG: 325 TABLET ORAL at 10:29

## 2025-06-01 RX ADMIN — METHYLPREDNISOLONE SODIUM SUCCINATE 40 MG: 40 INJECTION, POWDER, LYOPHILIZED, FOR SOLUTION INTRAMUSCULAR; INTRAVENOUS at 10:00

## 2025-06-01 RX ADMIN — ACETYLCYSTEINE 400 MG: 100 INHALANT RESPIRATORY (INHALATION) at 17:38

## 2025-06-01 RX ADMIN — GUAIFENESIN 200 MG: 200 SOLUTION ORAL at 10:03

## 2025-06-01 ASSESSMENT — PAIN - FUNCTIONAL ASSESSMENT
PAIN_FUNCTIONAL_ASSESSMENT: ACTIVITIES ARE NOT PREVENTED
PAIN_FUNCTIONAL_ASSESSMENT: PREVENTS OR INTERFERES SOME ACTIVE ACTIVITIES AND ADLS
PAIN_FUNCTIONAL_ASSESSMENT: PREVENTS OR INTERFERES SOME ACTIVE ACTIVITIES AND ADLS

## 2025-06-01 ASSESSMENT — PAIN SCALES - GENERAL
PAINLEVEL_OUTOF10: 9
PAINLEVEL_OUTOF10: 7
PAINLEVEL_OUTOF10: 1
PAINLEVEL_OUTOF10: 0
PAINLEVEL_OUTOF10: 1
PAINLEVEL_OUTOF10: 10
PAINLEVEL_OUTOF10: 0

## 2025-06-01 ASSESSMENT — PAIN DESCRIPTION - ORIENTATION: ORIENTATION: RIGHT;LEFT

## 2025-06-01 ASSESSMENT — PAIN DESCRIPTION - LOCATION
LOCATION: HEAD
LOCATION: BACK;NECK

## 2025-06-01 ASSESSMENT — PAIN DESCRIPTION - DESCRIPTORS
DESCRIPTORS: ACHING;DISCOMFORT
DESCRIPTORS: ACHING;DISCOMFORT

## 2025-06-01 ASSESSMENT — PAIN SCALES - WONG BAKER
WONGBAKER_NUMERICALRESPONSE: NO HURT
WONGBAKER_NUMERICALRESPONSE: NO HURT

## 2025-06-01 NOTE — PLAN OF CARE
Problem: Discharge Planning  Goal: Discharge to home or other facility with appropriate resources  6/1/2025 1226 by Cat Aleman RN  Outcome: Progressing  Flowsheets (Taken 5/30/2025 2000 by Shamar Yee, RN)  Discharge to home or other facility with appropriate resources: Identify barriers to discharge with patient and caregiver     Problem: Knowledge deficit on diet management  Goal: Adequate nutrition  Description: Patient/caregiver will demonstrate adequate nutritional intake in pneumonia recovery within 14 day(s).  6/1/2025 1226 by Cat Aleman RN  Outcome: Progressing     Problem: Knowledge deficit on pneumonia  Goal: Knowledge - disease process  Description: Patient/Caregiver will verbalize adequate knowledge of pneumonia as evidenced by statements of signs/symptoms, transmissions and causes, signs of exacerbation and actions to take within 2 day(s).  6/1/2025 1226 by Cat Aleman RN  Outcome: Progressing     Problem: HH PHYSICAL DISCOMFORT-PNEUMONIA  Goal: *Control of Pain  Description: Patient will report that symptoms of physical discomfort have been reduced or controlled through verbal or nonverbal means and that measures to promote comfort are effective within 1 hour.  6/1/2025 1226 by Cat Aleman RN  Outcome: Progressing     Problem: Nutrition Deficit:  Goal: Optimize nutritional status  6/1/2025 1226 by Cat Aleman RN  Outcome: Progressing  Flowsheets (Taken 5/30/2025 1159 by Nohemi Messer, RD, LD)  Nutrient intake appropriate for improving, restoring, or maintaining nutritional needs:   Assess nutritional status and recommend course of action   Monitor oral intake, labs, and treatment plans   Recommend appropriate diets, oral nutritional supplements, and vitamin/mineral supplements   Order, calculate, and assess calorie counts as needed     Problem: Skin/Tissue Integrity - Adult  Goal: Skin integrity remains intact  6/1/2025 1226 by Cat Aleman

## 2025-06-02 LAB
ALBUMIN SERPL-MCNC: 3.1 G/DL (ref 3.5–5.2)
ALP SERPL-CCNC: 68 U/L (ref 35–104)
ALT SERPL-CCNC: 27 U/L (ref 0–32)
ANION GAP SERPL CALCULATED.3IONS-SCNC: 14 MMOL/L (ref 7–16)
AST SERPL-CCNC: 32 U/L (ref 0–31)
BASOPHILS # BLD: 0.04 K/UL (ref 0–0.2)
BASOPHILS NFR BLD: 0 % (ref 0–2)
BILIRUB SERPL-MCNC: 0.3 MG/DL (ref 0–1.2)
BUN SERPL-MCNC: 8 MG/DL (ref 6–20)
CALCIUM SERPL-MCNC: 9.1 MG/DL (ref 8.6–10.2)
CHLORIDE SERPL-SCNC: 98 MMOL/L (ref 98–107)
CO2 SERPL-SCNC: 22 MMOL/L (ref 22–29)
CREAT SERPL-MCNC: 0.5 MG/DL (ref 0.5–1)
EOSINOPHIL # BLD: 0 K/UL (ref 0.05–0.5)
EOSINOPHILS RELATIVE PERCENT: 0 % (ref 0–6)
ERYTHROCYTE [DISTWIDTH] IN BLOOD BY AUTOMATED COUNT: 11.6 % (ref 11.5–15)
GFR, ESTIMATED: >90 ML/MIN/1.73M2
GLUCOSE SERPL-MCNC: 122 MG/DL (ref 74–99)
HCT VFR BLD AUTO: 40.4 % (ref 34–48)
HGB BLD-MCNC: 14 G/DL (ref 11.5–15.5)
IMM GRANULOCYTES # BLD AUTO: 0.38 K/UL (ref 0–0.58)
IMM GRANULOCYTES NFR BLD: 4 % (ref 0–5)
LYMPHOCYTES NFR BLD: 1.23 K/UL (ref 1.5–4)
LYMPHOCYTES RELATIVE PERCENT: 13 % (ref 20–42)
MAGNESIUM SERPL-MCNC: 2.3 MG/DL (ref 1.6–2.6)
MCH RBC QN AUTO: 34.1 PG (ref 26–35)
MCHC RBC AUTO-ENTMCNC: 34.7 G/DL (ref 32–34.5)
MCV RBC AUTO: 98.5 FL (ref 80–99.9)
MONOCYTES NFR BLD: 0.27 K/UL (ref 0.1–0.95)
MONOCYTES NFR BLD: 3 % (ref 2–12)
MYCOPLASMA AB,IGM: PRESENT
NEUTROPHILS NFR BLD: 80 % (ref 43–80)
NEUTS SEG NFR BLD: 7.45 K/UL (ref 1.8–7.3)
PHOSPHATE SERPL-MCNC: 3.6 MG/DL (ref 2.5–4.5)
PLATELET # BLD AUTO: 338 K/UL (ref 130–450)
PMV BLD AUTO: 10.1 FL (ref 7–12)
POTASSIUM SERPL-SCNC: 4.6 MMOL/L (ref 3.5–5)
PROT SERPL-MCNC: 6.6 G/DL (ref 6.4–8.3)
RBC # BLD AUTO: 4.1 M/UL (ref 3.5–5.5)
SODIUM SERPL-SCNC: 134 MMOL/L (ref 132–146)
WBC OTHER # BLD: 9.4 K/UL (ref 4.5–11.5)

## 2025-06-02 PROCEDURE — 6370000000 HC RX 637 (ALT 250 FOR IP)

## 2025-06-02 PROCEDURE — 2060000000 HC ICU INTERMEDIATE R&B

## 2025-06-02 PROCEDURE — 6370000000 HC RX 637 (ALT 250 FOR IP): Performed by: INTERNAL MEDICINE

## 2025-06-02 PROCEDURE — 94640 AIRWAY INHALATION TREATMENT: CPT

## 2025-06-02 PROCEDURE — 2500000003 HC RX 250 WO HCPCS: Performed by: INTERNAL MEDICINE

## 2025-06-02 PROCEDURE — 84100 ASSAY OF PHOSPHORUS: CPT

## 2025-06-02 PROCEDURE — 6360000002 HC RX W HCPCS

## 2025-06-02 PROCEDURE — 97161 PT EVAL LOW COMPLEX 20 MIN: CPT | Performed by: PHYSICAL THERAPIST

## 2025-06-02 PROCEDURE — 80053 COMPREHEN METABOLIC PANEL: CPT

## 2025-06-02 PROCEDURE — 36415 COLL VENOUS BLD VENIPUNCTURE: CPT

## 2025-06-02 PROCEDURE — 2580000003 HC RX 258: Performed by: INTERNAL MEDICINE

## 2025-06-02 PROCEDURE — 6360000002 HC RX W HCPCS: Performed by: INTERNAL MEDICINE

## 2025-06-02 PROCEDURE — 94669 MECHANICAL CHEST WALL OSCILL: CPT

## 2025-06-02 PROCEDURE — 99233 SBSQ HOSP IP/OBS HIGH 50: CPT | Performed by: INTERNAL MEDICINE

## 2025-06-02 PROCEDURE — 83735 ASSAY OF MAGNESIUM: CPT

## 2025-06-02 PROCEDURE — 2700000000 HC OXYGEN THERAPY PER DAY

## 2025-06-02 PROCEDURE — 94761 N-INVAS EAR/PLS OXIMETRY MLT: CPT

## 2025-06-02 PROCEDURE — 85025 COMPLETE CBC W/AUTO DIFF WBC: CPT

## 2025-06-02 RX ORDER — ACETYLCYSTEINE 100 MG/ML
4 SOLUTION ORAL; RESPIRATORY (INHALATION)
Status: DISCONTINUED | OUTPATIENT
Start: 2025-06-02 | End: 2025-06-03 | Stop reason: HOSPADM

## 2025-06-02 RX ADMIN — IPRATROPIUM BROMIDE AND ALBUTEROL SULFATE 1 DOSE: .5; 2.5 SOLUTION RESPIRATORY (INHALATION) at 07:06

## 2025-06-02 RX ADMIN — ENOXAPARIN SODIUM 40 MG: 100 INJECTION SUBCUTANEOUS at 08:30

## 2025-06-02 RX ADMIN — ARFORMOTEROL TARTRATE 15 MCG: 15 SOLUTION RESPIRATORY (INHALATION) at 07:19

## 2025-06-02 RX ADMIN — BUDESONIDE 500 MCG: 0.5 SUSPENSION RESPIRATORY (INHALATION) at 17:03

## 2025-06-02 RX ADMIN — Medication 4 ML: at 07:07

## 2025-06-02 RX ADMIN — METOPROLOL SUCCINATE 100 MG: 25 TABLET, FILM COATED, EXTENDED RELEASE ORAL at 08:30

## 2025-06-02 RX ADMIN — IPRATROPIUM BROMIDE AND ALBUTEROL SULFATE 1 DOSE: .5; 2.5 SOLUTION RESPIRATORY (INHALATION) at 10:17

## 2025-06-02 RX ADMIN — ACETYLCYSTEINE 400 MG: 100 INHALANT RESPIRATORY (INHALATION) at 14:07

## 2025-06-02 RX ADMIN — METHYLPREDNISOLONE SODIUM SUCCINATE 40 MG: 40 INJECTION, POWDER, LYOPHILIZED, FOR SOLUTION INTRAMUSCULAR; INTRAVENOUS at 19:58

## 2025-06-02 RX ADMIN — ACETYLCYSTEINE 400 MG: 100 INHALANT RESPIRATORY (INHALATION) at 07:07

## 2025-06-02 RX ADMIN — IPRATROPIUM BROMIDE AND ALBUTEROL SULFATE 1 DOSE: .5; 2.5 SOLUTION RESPIRATORY (INHALATION) at 14:07

## 2025-06-02 RX ADMIN — LEVOFLOXACIN 750 MG: 5 INJECTION, SOLUTION INTRAVENOUS at 14:23

## 2025-06-02 RX ADMIN — METHYLPREDNISOLONE SODIUM SUCCINATE 40 MG: 40 INJECTION, POWDER, LYOPHILIZED, FOR SOLUTION INTRAMUSCULAR; INTRAVENOUS at 08:30

## 2025-06-02 RX ADMIN — ARFORMOTEROL TARTRATE 15 MCG: 15 SOLUTION RESPIRATORY (INHALATION) at 17:03

## 2025-06-02 RX ADMIN — BUDESONIDE 500 MCG: 0.5 SUSPENSION RESPIRATORY (INHALATION) at 07:07

## 2025-06-02 RX ADMIN — OXYCODONE AND ACETAMINOPHEN 1 TABLET: 10; 325 TABLET ORAL at 04:34

## 2025-06-02 RX ADMIN — Medication 4 ML: at 17:00

## 2025-06-02 RX ADMIN — IPRATROPIUM BROMIDE AND ALBUTEROL SULFATE 1 DOSE: .5; 2.5 SOLUTION RESPIRATORY (INHALATION) at 17:00

## 2025-06-02 RX ADMIN — PANTOPRAZOLE SODIUM 40 MG: 40 TABLET, DELAYED RELEASE ORAL at 06:03

## 2025-06-02 RX ADMIN — ACETYLCYSTEINE 400 MG: 100 INHALANT RESPIRATORY (INHALATION) at 17:00

## 2025-06-02 RX ADMIN — ACETAMINOPHEN 650 MG: 325 TABLET ORAL at 19:57

## 2025-06-02 RX ADMIN — ACETYLCYSTEINE 400 MG: 100 INHALANT RESPIRATORY (INHALATION) at 10:17

## 2025-06-02 RX ADMIN — BUSPIRONE HYDROCHLORIDE 5 MG: 5 TABLET ORAL at 19:57

## 2025-06-02 RX ADMIN — ACETAMINOPHEN 650 MG: 325 TABLET ORAL at 11:13

## 2025-06-02 ASSESSMENT — PAIN - FUNCTIONAL ASSESSMENT
PAIN_FUNCTIONAL_ASSESSMENT: ACTIVITIES ARE NOT PREVENTED
PAIN_FUNCTIONAL_ASSESSMENT: ACTIVITIES ARE NOT PREVENTED

## 2025-06-02 ASSESSMENT — PAIN DESCRIPTION - DESCRIPTORS
DESCRIPTORS: ACHING;DISCOMFORT
DESCRIPTORS: DISCOMFORT
DESCRIPTORS: ACHING

## 2025-06-02 ASSESSMENT — PAIN DESCRIPTION - LOCATION
LOCATION: HEAD

## 2025-06-02 ASSESSMENT — PAIN SCALES - GENERAL
PAINLEVEL_OUTOF10: 3
PAINLEVEL_OUTOF10: 0
PAINLEVEL_OUTOF10: 2
PAINLEVEL_OUTOF10: 3
PAINLEVEL_OUTOF10: 8
PAINLEVEL_OUTOF10: 0

## 2025-06-02 ASSESSMENT — PAIN SCALES - WONG BAKER: WONGBAKER_NUMERICALRESPONSE: NO HURT

## 2025-06-03 VITALS
WEIGHT: 166.89 LBS | RESPIRATION RATE: 19 BRPM | HEART RATE: 74 BPM | BODY MASS INDEX: 30.71 KG/M2 | TEMPERATURE: 97.3 F | DIASTOLIC BLOOD PRESSURE: 63 MMHG | OXYGEN SATURATION: 92 % | HEIGHT: 62 IN | SYSTOLIC BLOOD PRESSURE: 113 MMHG

## 2025-06-03 LAB
ALBUMIN SERPL-MCNC: 3.2 G/DL (ref 3.5–5.2)
ALP SERPL-CCNC: 66 U/L (ref 35–104)
ALT SERPL-CCNC: 27 U/L (ref 0–32)
ANION GAP SERPL CALCULATED.3IONS-SCNC: 14 MMOL/L (ref 7–16)
AST SERPL-CCNC: 25 U/L (ref 0–31)
BASOPHILS # BLD: 0 K/UL (ref 0–0.2)
BASOPHILS NFR BLD: 0 % (ref 0–2)
BILIRUB SERPL-MCNC: 0.3 MG/DL (ref 0–1.2)
BUN SERPL-MCNC: 9 MG/DL (ref 6–20)
CALCIUM SERPL-MCNC: 9.1 MG/DL (ref 8.6–10.2)
CHLORIDE SERPL-SCNC: 97 MMOL/L (ref 98–107)
CO2 SERPL-SCNC: 22 MMOL/L (ref 22–29)
CREAT SERPL-MCNC: 0.6 MG/DL (ref 0.5–1)
EOSINOPHIL # BLD: 0 K/UL (ref 0.05–0.5)
EOSINOPHILS RELATIVE PERCENT: 0 % (ref 0–6)
ERYTHROCYTE [DISTWIDTH] IN BLOOD BY AUTOMATED COUNT: 11.5 % (ref 11.5–15)
GFR, ESTIMATED: >90 ML/MIN/1.73M2
GLUCOSE SERPL-MCNC: 103 MG/DL (ref 74–99)
HCT VFR BLD AUTO: 38.7 % (ref 34–48)
HGB BLD-MCNC: 13.5 G/DL (ref 11.5–15.5)
LYMPHOCYTES NFR BLD: 1.85 K/UL (ref 1.5–4)
LYMPHOCYTES RELATIVE PERCENT: 15 % (ref 20–42)
MAGNESIUM SERPL-MCNC: 2.1 MG/DL (ref 1.6–2.6)
MCH RBC QN AUTO: 34 PG (ref 26–35)
MCHC RBC AUTO-ENTMCNC: 34.9 G/DL (ref 32–34.5)
MCV RBC AUTO: 97.5 FL (ref 80–99.9)
METAMYELOCYTES ABSOLUTE COUNT: 0.1 K/UL (ref 0–0.12)
METAMYELOCYTES: 1 % (ref 0–1)
MONOCYTES NFR BLD: 0.62 K/UL (ref 0.1–0.95)
MONOCYTES NFR BLD: 5 % (ref 2–12)
NEUTROPHILS NFR BLD: 79 % (ref 43–80)
NEUTS SEG NFR BLD: 9.64 K/UL (ref 1.8–7.3)
PHOSPHATE SERPL-MCNC: 3 MG/DL (ref 2.5–4.5)
PLATELET # BLD AUTO: 392 K/UL (ref 130–450)
PMV BLD AUTO: 9.8 FL (ref 7–12)
POTASSIUM SERPL-SCNC: 4.2 MMOL/L (ref 3.5–5)
PROT SERPL-MCNC: 6.5 G/DL (ref 6.4–8.3)
RBC # BLD AUTO: 3.97 M/UL (ref 3.5–5.5)
RBC # BLD: NORMAL 10*6/UL
SODIUM SERPL-SCNC: 133 MMOL/L (ref 132–146)
WBC OTHER # BLD: 12.2 K/UL (ref 4.5–11.5)

## 2025-06-03 PROCEDURE — 36415 COLL VENOUS BLD VENIPUNCTURE: CPT

## 2025-06-03 PROCEDURE — 6370000000 HC RX 637 (ALT 250 FOR IP): Performed by: INTERNAL MEDICINE

## 2025-06-03 PROCEDURE — 97165 OT EVAL LOW COMPLEX 30 MIN: CPT

## 2025-06-03 PROCEDURE — 2700000000 HC OXYGEN THERAPY PER DAY

## 2025-06-03 PROCEDURE — 97530 THERAPEUTIC ACTIVITIES: CPT

## 2025-06-03 PROCEDURE — 94761 N-INVAS EAR/PLS OXIMETRY MLT: CPT

## 2025-06-03 PROCEDURE — 6360000002 HC RX W HCPCS

## 2025-06-03 PROCEDURE — 94669 MECHANICAL CHEST WALL OSCILL: CPT

## 2025-06-03 PROCEDURE — 6370000000 HC RX 637 (ALT 250 FOR IP)

## 2025-06-03 PROCEDURE — 80053 COMPREHEN METABOLIC PANEL: CPT

## 2025-06-03 PROCEDURE — 97110 THERAPEUTIC EXERCISES: CPT

## 2025-06-03 PROCEDURE — 84100 ASSAY OF PHOSPHORUS: CPT

## 2025-06-03 PROCEDURE — 2500000003 HC RX 250 WO HCPCS: Performed by: INTERNAL MEDICINE

## 2025-06-03 PROCEDURE — 83735 ASSAY OF MAGNESIUM: CPT

## 2025-06-03 PROCEDURE — 99233 SBSQ HOSP IP/OBS HIGH 50: CPT | Performed by: INTERNAL MEDICINE

## 2025-06-03 PROCEDURE — 85025 COMPLETE CBC W/AUTO DIFF WBC: CPT

## 2025-06-03 PROCEDURE — 6360000002 HC RX W HCPCS: Performed by: INTERNAL MEDICINE

## 2025-06-03 PROCEDURE — 94640 AIRWAY INHALATION TREATMENT: CPT

## 2025-06-03 RX ORDER — LEVOFLOXACIN 750 MG/1
750 TABLET, FILM COATED ORAL DAILY
Qty: 5 TABLET | Refills: 0 | Status: SHIPPED | OUTPATIENT
Start: 2025-06-03 | End: 2025-06-08

## 2025-06-03 RX ORDER — AMLODIPINE BESYLATE 10 MG/1
10 TABLET ORAL DAILY
Qty: 30 TABLET | Refills: 3 | Status: SHIPPED | OUTPATIENT
Start: 2025-06-03

## 2025-06-03 RX ORDER — TIOTROPIUM BROMIDE 18 UG/1
18 CAPSULE ORAL; RESPIRATORY (INHALATION) DAILY
Qty: 30 CAPSULE | Refills: 0 | Status: SHIPPED | OUTPATIENT
Start: 2025-06-03

## 2025-06-03 RX ORDER — BUDESONIDE AND FORMOTEROL FUMARATE DIHYDRATE 160; 4.5 UG/1; UG/1
2 AEROSOL RESPIRATORY (INHALATION) 2 TIMES DAILY
Qty: 10.2 G | Refills: 3 | Status: SHIPPED | OUTPATIENT
Start: 2025-06-03

## 2025-06-03 RX ORDER — METHYLPREDNISOLONE 4 MG/1
TABLET ORAL
Qty: 1 KIT | Refills: 0 | Status: SHIPPED | OUTPATIENT
Start: 2025-06-03 | End: 2025-06-09

## 2025-06-03 RX ORDER — ALBUTEROL SULFATE 90 UG/1
2 INHALANT RESPIRATORY (INHALATION) EVERY 6 HOURS PRN
Qty: 18 G | Refills: 3 | Status: SHIPPED | OUTPATIENT
Start: 2025-06-03

## 2025-06-03 RX ORDER — GUAIFENESIN 200 MG/10ML
200 LIQUID ORAL EVERY 4 HOURS PRN
Qty: 1 EACH | Refills: 0 | Status: SHIPPED | OUTPATIENT
Start: 2025-06-03

## 2025-06-03 RX ADMIN — PANTOPRAZOLE SODIUM 40 MG: 40 TABLET, DELAYED RELEASE ORAL at 06:01

## 2025-06-03 RX ADMIN — IPRATROPIUM BROMIDE AND ALBUTEROL SULFATE 1 DOSE: .5; 2.5 SOLUTION RESPIRATORY (INHALATION) at 14:21

## 2025-06-03 RX ADMIN — IPRATROPIUM BROMIDE AND ALBUTEROL SULFATE 1 DOSE: .5; 2.5 SOLUTION RESPIRATORY (INHALATION) at 09:33

## 2025-06-03 RX ADMIN — METHYLPREDNISOLONE SODIUM SUCCINATE 40 MG: 40 INJECTION, POWDER, LYOPHILIZED, FOR SOLUTION INTRAMUSCULAR; INTRAVENOUS at 08:38

## 2025-06-03 RX ADMIN — HYDRALAZINE HYDROCHLORIDE 10 MG: 20 INJECTION INTRAMUSCULAR; INTRAVENOUS at 06:01

## 2025-06-03 RX ADMIN — ENOXAPARIN SODIUM 40 MG: 100 INJECTION SUBCUTANEOUS at 08:38

## 2025-06-03 RX ADMIN — METOPROLOL SUCCINATE 100 MG: 25 TABLET, FILM COATED, EXTENDED RELEASE ORAL at 08:39

## 2025-06-03 NOTE — PLAN OF CARE
Problem: Discharge Planning  Goal: Discharge to home or other facility with appropriate resources  6/3/2025 0934 by Dhaval Tarango RN  Outcome: Progressing  6/3/2025 0034 by Ruthann Tellez RN  Outcome: Progressing  Flowsheets (Taken 6/2/2025 2000)  Discharge to home or other facility with appropriate resources:   Identify barriers to discharge with patient and caregiver   Arrange for needed discharge resources and transportation as appropriate   Identify discharge learning needs (meds, wound care, etc)   Refer to discharge planning if patient needs post-hospital services based on physician order or complex needs related to functional status, cognitive ability or social support system     Problem: Knowledge deficit on diet management  Description: Lack of knowledge of nutrition/hydration management related to pneumonia  Goal: Adequate nutrition  Description: Patient/caregiver will demonstrate adequate nutritional intake in pneumonia recovery within 14 day(s).  6/3/2025 0934 by Dhaval Tarango RN  Outcome: Progressing  6/3/2025 0034 by Ruthann Tellez RN  Outcome: Progressing     Problem: Knowledge deficit on pneumonia  Description: Lack of knowledge of management of pneumonia disease process  Goal: Knowledge - disease process  Description: Patient/Caregiver will verbalize adequate knowledge of pneumonia as evidenced by statements of signs/symptoms, transmissions and causes, signs of exacerbation and actions to take within 2 day(s).  6/3/2025 0934 by Dhaval Tarango RN  Outcome: Progressing  6/3/2025 0034 by Ruthann Tellez RN  Outcome: Progressing     Problem: HH PHYSICAL DISCOMFORT-PNEUMONIA  Description: Physical discomfort related to pneumonia  Goal: *Control of Pain  Description: Patient will report that symptoms of physical discomfort have been reduced or controlled through verbal or nonverbal means and that measures to promote comfort are effective within 1 hour.  6/3/2025 0934 by Sukhdeep

## 2025-06-03 NOTE — PLAN OF CARE
Problem: Discharge Planning  Goal: Discharge to home or other facility with appropriate resources  Outcome: Progressing  Flowsheets (Taken 6/2/2025 2000)  Discharge to home or other facility with appropriate resources:   Identify barriers to discharge with patient and caregiver   Arrange for needed discharge resources and transportation as appropriate   Identify discharge learning needs (meds, wound care, etc)   Refer to discharge planning if patient needs post-hospital services based on physician order or complex needs related to functional status, cognitive ability or social support system     Problem: Knowledge deficit on diet management  Description: Lack of knowledge of nutrition/hydration management related to pneumonia  Goal: Adequate nutrition  Description: Patient/caregiver will demonstrate adequate nutritional intake in pneumonia recovery within 14 day(s).  Outcome: Progressing     Problem: Knowledge deficit on pneumonia  Description: Lack of knowledge of management of pneumonia disease process  Goal: Knowledge - disease process  Description: Patient/Caregiver will verbalize adequate knowledge of pneumonia as evidenced by statements of signs/symptoms, transmissions and causes, signs of exacerbation and actions to take within 2 day(s).  Outcome: Progressing     Problem: HH PHYSICAL DISCOMFORT-PNEUMONIA  Description: Physical discomfort related to pneumonia  Goal: *Control of Pain  Description: Patient will report that symptoms of physical discomfort have been reduced or controlled through verbal or nonverbal means and that measures to promote comfort are effective within 1 hour.  Outcome: Progressing     Problem: Respiratory - Adult  Goal: Achieves optimal ventilation and oxygenation  Outcome: Progressing  Flowsheets (Taken 6/2/2025 2000)  Achieves optimal ventilation and oxygenation:   Assess for changes in respiratory status   Assess for changes in mentation and behavior   Position to facilitate

## 2025-06-03 NOTE — CARE COORDINATION
SOCIAL WORK / DISCHARGE PLANNING:  Pt accepted by [facility] for home O2, walk test completed, required 2L O2. Script will be obtained and forwarded to them. Dc plan remains to return home with spouse as previous. BP was elevated last evening. Family will provide home going transport.           Electronically signed by KYREE Ridley on 6/3/2025 at 10:56 AM      
SOCIAL WORK / DISCHARGE PLANNING:  Sw met with pt at bedside to discuss care coordination. Pt states possible dc next date. Walk test request to be completed in anticipation of probable need to dc with home O2. [facility] is [employer] provider and she is agreeable to this. Referral placed in [facility]. Pt will return home with spouse, reports to be ambulatory and denying any other dc needs. Spouse will provide home going transport.             Electronically signed by KYREE Ridley on 6/2/2025 at 4:56 PM      
SOCIAL WORK / DISCHARGE PLANNING:  Sw met with pt at bedside.  Discussed home O2, arranged with [facility] deliver portable O2 tank to room. Pt denies any other dc needs at this time. Family will provide home going transport.             Electronically signed by KYREE Ridley on 6/3/2025 at 12:43 PM      
Product Type: *No Product type* /         [id number] - [place], [place] - [address] - P [phone] - F [phone]  [address] [place] [address]  Phone: [phone] Fax: [phone]      Notes:    Factors facilitating achievement of predicted outcomes: Family support    Barriers to discharge: none     Additional Case Management Notes: 5-30-Cm note: met with pt for coordination of care, pt lives with her , she is indepdendent, no DME, no snf hx. Pt is requiring Airvo 50L 60% FIO2, pt has no home oxygen, she will need testing when closer to dc if still requiring 02. Pt has no choice for DME co. Pt's  will transport home. Electronically signed by Ailin Ryan RN on 5/30/2025 at 2:50 PM        The Plan for Transition of Care is related to the following treatment goals of Hyponatremia [E87.1]  Hypoxia [R09.02]  Human metapneumovirus (hMPV) pneumonia [J12.3]  Pneumonia of both lungs due to infectious organism, unspecified part of lung [J18.9]  Community acquired pneumonia, bilateral [J18.9]  Community acquired pneumonia of both lower lobes [J18.9]      Ailin Ryan RN  Case Management Department  Ph: [phone] Fax: [phone]

## 2025-06-03 NOTE — PROGRESS NOTES
CRITICAL CARE PROGRESS NOTE      [facility]  Department of Pulmonary, Critical Care and Sleep Medicine  [facility]  Dr. Key, Dr. Landeros, Dr. Sanchez    SUBJECTIVE:  Patient's oxygen requirement has been weaned down to 6 L high flow nasal cannula however patient continues to have difficulty expectorating her sputum.  She has no fever, chills, rigors.  She continues to have significant wheezing on exam.  Chest x-ray from 8/28/2025 was personally reviewed and independently interpreted and showed bilateral lower lobe pneumonic infiltrates.  CT scan of the chest from May 30, 2025 showed diffuse bilateral groundglass opacities mostly in the upper lobes.  Patient will be discussed with primary team    OBJECTIVE:  Vitals:    06/01/25 0415 06/01/25 0547 06/01/25 0712 06/01/25 0959   BP: 122/80  138/72 138/72   Pulse: 77  94 94   Resp: 22 22 24    Temp: 98.4 °F (36.9 °C)  97.9 °F (36.6 °C)    TempSrc: Oral  Infrared    SpO2: 95%  96%    Weight:       Height:           1. General: Alert and oriented x 3, in mild respiratory distress  2. Eyes: Vision - grossly normal, PERRLA   3. HENT: Head is atraumatic & normocephalic. Neck Supple, No jugular venous distention   4. Respiratory: Decreased breath sound bilaterally with scattered crackles and significant wheezing  5. Cardiovascular: S1, S2 normal, Regular rate & rhythm, No murmur, No pedal edema   6. Gastrointestinal: Soft, Non-tender, Non-distended, Normal bowel sounds. No organomegaly.  7. Neurologic: Awake & Alert, Cranial nerves 2-12 grossly intact, No focal motor or sensory deficits.  8. Skin: no rashes, breakdown  9. Musculoskeletal: no LE edema, no joint effusion.  10. Psychiatric - No Anxiety, Depression    DATA:    CBC:   Lab Results   Component Value Date    WBC 19.4 (H) 05/31/2025    HGB 13.4 05/31/2025    HCT 38.1 05/31/2025    MCV 97.2 05/31/2025     05/31/2025     LFTs:   Lab 
            CRITICAL CARE PROGRESS NOTE      [facility]  Department of Pulmonary, Critical Care and Sleep Medicine  [facility]  Dr. Key, Dr. Landeros, Dr. Sanchez    SUBJECTIVE:    Patient seen and examined at bedside.  Overnight events noted.  Down to 11L HFNC however reports this is causing her to feel burning in her nasal passages. Will try venturi mask    OBJECTIVE:  Vitals:    05/31/25 0700 05/31/25 0800 05/31/25 0830 05/31/25 0900   BP: (!) 132/95 (!) 151/76  (!) 141/83   Pulse: 62 65  64   Resp: 20 19  20   Temp:  97.2 °F (36.2 °C)     TempSrc:  Oral     SpO2: 94% 97% 91% 96%   Weight:       Height:           1. General: Alert and oriented x 3, No acute distress.  2. Eyes: Vision - grossly normal, PERRLA   3. HENT: Head is atraumatic & normocephalic. Neck Supple, No jugular venous distention   4. Respiratory: Lungs are clear to auscultation, Respirations are non-labored, Breath sounds are equal   5. Cardiovascular: S1, S2 normal, Regular rate & rhythm, No murmur, No pedal edema   6. Gastrointestinal: Soft, Non-tender, Non-distended, Normal bowel sounds. No organomegaly.  7. Neurologic: Awake & Alert, Cranial nerves 2-12 grossly intact, No focal motor or sensory deficits.  8. Skin: no rashes, breakdown  9. Musculoskeletal: no LE edema, no joint effusion.  10. Psychiatric - No Anxiety, Depression    DATA:    CBC:   Lab Results   Component Value Date    WBC 19.4 (H) 05/31/2025    HGB 13.4 05/31/2025    HCT 38.1 05/31/2025    MCV 97.2 05/31/2025     05/31/2025     LFTs:   Lab Results   Component Value Date    ALT 25 05/31/2025    AST 47 (H) 05/31/2025    ALKPHOS 75 05/31/2025    BILITOT 0.2 05/31/2025    ALBUMIN 3.0 (L) 05/31/2025     Coags:   Lab Results   Component Value Date    INR 1.0 03/14/2022       RADIOLOGY:      Echo (TTE) complete (PRN contrast/bubble/strain/3D)  Result Date: 5/30/2025    Left Ventricle: Normal left ventricular 
  Intensive Care Daily Quality Rounding Checklist      ICU Team Members:     ICU Day #: NUMBER: 3    SOFA Score:2    Intubation Date: N/A    Ventilator Day #: N/A    Central Line Insertion Date: N/A         Arterial Line Insertion Date: N/A      Temporary Hemodialysis Catheter Insertion Date: none      DVT Prophylaxis: lovenox daily    GI Prophylaxis: protonix daily    Guajardo Catheter Insertion Date: no     Skin Issues/ Wounds and ordered treatment discussed on rounds: n    Goals/ Plans for the Day:  decrease o2 demand, comfort,     Reviewed plan and goals for day with patient and/or representative: Yes    **SHARE this note so that the rounding nurse may edit**     
  Intensive Care Daily Quality Rounding Checklist      ICU Team Members: Shamar AMEZCUA    ICU Day #: NUMBER: 2    SOFA Score: 3    Intubation Date: n/a    Central Line Insertion Date: n/a         Arterial Line Insertion Date: n/a      Temporary Hemodialysis Catheter Insertion Date: none    DVT Prophylaxis: lovenox daily    GI Prophylaxis: protonix daily    Guajardo Catheter Insertion Date: no      Skin Issues/ Wounds and ordered treatment discussed on rounds: none    Goals/ Plans for the Day: wean FiO2    Reviewed plan and goals for day with patient and/or representative: Yes    **SHARE this note so that the rounding nurse may edit**     
CLINICAL PHARMACY NOTE: MEDS TO BEDS    Total # of Prescriptions Filled: 7   The following medications were delivered to the patient:  Nichol-Tussin 100 mg/5 mL liqd  Medrol 4 mg dose pack  Spiriva Handihaler 18 mcg  Symbicort 160-4.5 mcg  Albuterol Sulf HFA  Levofloxacin 750 mg  Amlodipine 10 mg    Additional Documentation:    Meds given to Dhaval BARNETT) at the nurse's station to give to patient.  
Comprehensive Nutrition Assessment    Type and Reason for Visit:  Initial, Positive nutrition screen, LOS    Nutrition Recommendations/Plan:   Continue Current Diet (Regular)  Begin ONS (low eber/high protein) BID  3.   Consult RD a needed      Malnutrition Assessment:  Malnutrition Status:  No malnutrition (05/30/25 1141)    Context:  Acute Illness     Findings of the 6 clinical characteristics of malnutrition:  Energy Intake:  Mild decrease in energy intake  Weight Loss:  No weight loss     Body Fat Loss:  No body fat loss     Muscle Mass Loss:  No muscle mass loss    Fluid Accumulation:  No fluid accumulation     Strength:  Not Performed    Nutrition Assessment:    Pt admit w/ PNA, Hypoxia, HypoNa+, Lekocytosis w/ left shift. PMHx: CBP, Knee pain, HTN, Herpes. Pt reports poor po intake prior to admission, however appetite is improving. Pt is a current smoker. Will provide ONS once daily to optimize nutrition status, continue inpatient monitoring, f/up per policy.    Nutrition Related Findings:    A/O x4, I/O -1058 since admit, abd wdl, +BS x4, Na+ 130, Chloride 96, WBC 24.1 Wound Type: None       Current Nutrition Intake & Therapies:    Average Meal Intake: 51-75%  Average Supplements Intake: None Ordered  ADULT DIET; Regular  ADULT ORAL NUTRITION SUPPLEMENT; Breakfast, Dinner; Low Calorie/High Protein Oral Supplement    Anthropometric Measures:  Height: 157.5 cm (5' 2.01\")  Ideal Body Weight (IBW): 110 lbs (50 kg)    Admission Body Weight: 78.4 kg (172 lb 13.5 oz) (05/30/25 bed scale)  Current Body Weight: 78.4 kg (172 lb 13.5 oz), 157.1 % IBW. Weight Source: Bed scale (05/30/25)  Current BMI (kg/m2): 31.6  Usual Body Weight: 77.1 kg (169 lb 15.6 oz) (10/18/24 stated)     % Weight Change (Calculated): 1.7  Weight Adjustment For: No Adjustment     BMI Categories: Obese Class 1 (BMI 30.0-34.9)    Estimated Daily Nutrient Needs:  Energy Requirements Based On: Kcal/kg  Weight Used for Energy Requirements: 
Internal Medicine Consult Note    [facility]    Carloz Moreland D.O., LAVELLOMary AliceI.                    Lamont Flores D.O., LAVELLOMary AliceIMary Alice Miller D.O.     Crow Mccarthy, MSN, APRN-CNP  Elliot Jean, MSN, APRN-CNP  Georgina Mak, MSN APRN-CNP  Patsy Kumar, MSN. APRN-NP-C     Primary Care Physician: Edwar Shah DO   Admitting Physician:  Carloz Moreland DO  Admission date and time: 5/28/2025  1:53 PM    Room:  [address] [place] [address]  Admitting diagnosis: Hyponatremia [E87.1]  Hypoxia [R09.02]  Human metapneumovirus (hMPV) pneumonia [J12.3]  Pneumonia of both lungs due to infectious organism, unspecified part of lung [J18.9]  Community acquired pneumonia, bilateral [J18.9]  Community acquired pneumonia of both lower lobes [J18.9]    Patient Name: Patsy Kennedy  MRN: [id number]    Date of Service: 6/2/2025     Subjective:  Patsy is a 54 y.o. female who was seen and examined today,6/2/2025, at the bedside.  Patsy was transferred from the intensive care unit.  Patsy is presently receiving chest vest therapy during examination.  Family are present.  We have reviewed the results of recent workup and plan of care moving forward.  She is tolerating current treatment with minimal difficulty and has been weaned down to 4 L of nasal cannula oxygen.  We continue to encourage increased activity, ambulation and further oxygen weaning.  No new symptoms or concerns are voiced at this time.      Review of System:   Constitutional:   Very anxious.  No reported fevers or chills.  Positive for malaise and fatigue.  HEENT:   Denies ear pain, sore throat, sinus or eye problems.  Nasal cannula oxygen is in place at 4 L.  Cardiovascular:   Denies any chest pain, irregular heartbeats, or palpitations.   Respiratory:   Resting and exertional dyspnea are improving.  Cough and congestion are improving.  Gastrointestinal:   Denies nausea, vomiting, diarrhea, or constipation.  Denies 
Internal Medicine Consult Note    [facility]    Carloz Moreland D.O., LAVELLOMary AliceI.                    Lamont Flores D.O., LAVELLOMary AliceIMary Alice Miller D.O.     Crow Mccarthy, MSN, APRN-CNP  Elliot Jean, MSN, APRN-CNP  Georgina Mak, MSN APRN-CNP  Patsy Kumar, MSN. APRN-NP-C     Primary Care Physician: Edwar Shah DO   Admitting Physician:  Carloz Moreland DO  Admission date and time: 5/28/2025  1:53 PM    Room:  [address] [place] [address]  Admitting diagnosis: Hyponatremia [E87.1]  Hypoxia [R09.02]  Human metapneumovirus (hMPV) pneumonia [J12.3]  Pneumonia of both lungs due to infectious organism, unspecified part of lung [J18.9]  Community acquired pneumonia, bilateral [J18.9]  Community acquired pneumonia of both lower lobes [J18.9]    Patient Name: Patsy Kennedy  MRN: [id number]    Date of Service: 6/1/2025     Subjective:  Patsy is a 54 y.o. female who was seen and examined today,6/1/2025, at the bedside.  Patsy was transferred from the intensive care unit.  Patsy was evaluated in the presence of family today.  She exhibits obvious signs of anxiety and hyperventilation.  She is maintaining appropriate saturations on 6 L of nasal cannula oxygen.  I have encouraged increased activity.    Review of System:   Constitutional:   Very anxious.  HEENT:   Denies ear pain, sore throat, sinus or eye problems.  Nasal cannula oxygen is in place at 6 L.  Cardiovascular:   Denies any chest pain, irregular heartbeats, or palpitations.   Respiratory:   Continues to describe shortness of breath.  Gastrointestinal:   Denies nausea, vomiting, diarrhea, or constipation.  Denies any abdominal pain.  Decreased appetite with decreased oral intake.  Genitourinary:    Denies any urgency, frequency, hematuria. Voiding  without difficulty.  Extremities:   Denies lower extremity swelling, edema or cyanosis.   Neurology:    Denies any headache or focal neurological deficits, describes 
Internal Medicine Consult Note    [facility]    Carloz Moreland D.O., LAVELLOMary AliceI.                    Lamont Flores D.O., LAVELLOMary AliceIMary Alice Miller D.O.     Crow Mccarthy, MSN, APRN-CNP  Elliot Jean, MSN, APRN-CNP  Georgina Mak, MSN APRN-CNP  Patsy Kumar, MSN. APRN-NP-C     Primary Care Physician: Edwar Shah DO   Admitting Physician:  Carloz Moreland DO  Admission date and time: 5/28/2025  1:53 PM    Room:  GJOAQD12/PVTAGK23-50  Admitting diagnosis: Hyponatremia [E87.1]  Hypoxia [R09.02]  Human metapneumovirus (hMPV) pneumonia [J12.3]  Pneumonia of both lungs due to infectious organism, unspecified part of lung [J18.9]  Community acquired pneumonia, bilateral [J18.9]  Community acquired pneumonia of both lower lobes [J18.9]    Patient Name: Patsy Kennedy  MRN: [id number]    Date of Service: 5/29/2025     Subjective:  Patsy is a 54 y.o. female who was seen and examined today,5/29/2025, at the bedside.  Since seeing a bed 17 awaiting bed availability on the floor.  Despite breathing treatment and steroids the patient seems to slowly be decompensating.  Increased oxygen demand is necessary.  The patient had tested positive for the human metapneumovirus.  The patient might need a higher level of care and we will obtain critical care consult with Dr. Landeros     present during my examination.    Review of System:   Constitutional:   Denies fever or chills, weight loss or gain.  Complains of increasing fatigue and shortness of breath  HEENT:   Denies ear pain, sore throat, sinus or eye problems.  Cardiovascular:   Denies any chest pain, irregular heartbeats, or palpitations.   Respiratory:   Aggressive shortness of breath with nonproductive cough  Gastrointestinal:   Denies nausea, vomiting, diarrhea, or constipation.  Denies any abdominal pain.  Genitourinary:    Denies any urgency, frequency, hematuria. Voiding  without 
Internal Medicine Consult Note    [facility]    Carloz Moreland D.O., LAVELLOMary AliceI.                    Lamont Flores D.O., LAVELLOMary AliceIMary Alice Miller D.O.     Crow Mccarthy, MSN, APRN-CNP  Elliot Jean, MSN, APRN-CNP  Georgina Mak, MSN APRN-CNP  Patsy Kumar, MSN. APRN-NP-C     Primary Care Physician: Edwar Shah DO   Admitting Physician:  Carloz Moreland DO  Admission date and time: 5/28/2025  1:53 PM    Room:  JTPKSC97/KGTMUJ92-61  Admitting diagnosis: Hyponatremia [E87.1]  Hypoxia [R09.02]  Human metapneumovirus (hMPV) pneumonia [J12.3]  Pneumonia of both lungs due to infectious organism, unspecified part of lung [J18.9]  Community acquired pneumonia, bilateral [J18.9]  Community acquired pneumonia of both lower lobes [J18.9]    Patient Name: Patsy Kennedy  MRN: [id number]    Date of Service: 5/31/2025     Subjective:  Patsy is a 54 y.o. female who was seen and examined today,5/31/2025, at the bedside.  Patsy states that she is breathing more comfortably today.  She is maintained on heated high flow nasal cannula oxygen at 50 L flow rate and 56% FiO2.  She remains profoundly weak and deconditioned.  Incentive spirometer and flutter valve are present at the bedside.  She describes a decreased appetite with decreased oral intake.  No family members were present during my examination.    Review of System:   Constitutional:   Generalized weakness and deconditioning.  Feeling poorly overall.  HEENT:   Denies ear pain, sore throat, sinus or eye problems.  Heated high flow nasal cannula oxygen is in place.  Cardiovascular:   Denies any chest pain, irregular heartbeats, or palpitations.   Respiratory:   Persistent exertional shortness of breath.  Less coughing paroxysms.  Gastrointestinal:   Denies nausea, vomiting, diarrhea, or constipation.  Denies any abdominal pain.  Decreased appetite with decreased oral intake.  Genitourinary:    Denies any 
Internal Medicine Consult Note    [facility]    Carloz Moreland D.O., LAVELLOMary AliceI.                    Lamont Flores D.O., LAVELLOMary AliceIMary Alice Miller D.O.     Crow Mccarthy, MSN, APRN-CNP  Elliot Jean, MSN, APRN-CNP  Georgina Mak, MSN APRN-CNP  Patsy Kumar, MSN. APRN-NP-C     Primary Care Physician: Edwar Shah DO   Admitting Physician:  Carloz Moreland DO  Admission date and time: 5/28/2025  1:53 PM    Room:  [id number]/[id number]  Admitting diagnosis: Hyponatremia [E87.1]  Hypoxia [R09.02]  Human metapneumovirus (hMPV) pneumonia [J12.3]  Pneumonia of both lungs due to infectious organism, unspecified part of lung [J18.9]  Community acquired pneumonia, bilateral [J18.9]  Community acquired pneumonia of both lower lobes [J18.9]    Patient Name: Patsy Kennedy  MRN: [id number]    Date of Service: 5/30/2025     Subjective:  Patsy is a 54 y.o. female who was seen and examined today,5/30/2025, at the bedside.  Patient seemed very short of breath with activity the patient is currently on 50 L oxygen at 58%.  Patient still have a productive cough.  Discussed obtaining CT of the lung for further evaluation.  Will check mycoplasma level.  Patient denies any chest pain    No family member present    Review of System:   Constitutional:   Denies fever or chills, weight loss or gain.  Complains of increasing fatigue and shortness of breath  HEENT:   Denies ear pain, sore throat, sinus or eye problems.  Cardiovascular:   Denies any chest pain, irregular heartbeats, or palpitations.   Respiratory:   Patient short of breath with activity.  Currently on Airvo at 50 L and 58%  Gastrointestinal:   Denies nausea, vomiting, diarrhea, or constipation.  Denies any abdominal pain.  Genitourinary:    Denies any urgency, frequency, hematuria. Voiding  without difficulty.  Extremities:   Denies lower extremity swelling, edema or cyanosis.   Neurology:    Denies any headache 
Patient went to CT with CCRN present. VSS.  
Patient's BP elevated this am, at 204/84. Patient does c/o slight headache, and states that she has been having nightmares while in the hospital. Denies having any blurred vision, or dizziness/ ringing in the ears. Patient seems anxious at this time, but is refusing to take buspar again fearing it caused her nightmares. This nurse did discuss as patient stated earlier in the evening on 6/2/25, she was having nightmares, this nurse did discuss that this may be from withdraw from nicotine, or alcohol. Patient states, \"maybe, I guess I'm not sure, but I don't want that medicine again, it made me sleep too good\".   Patient states that she is a daily drinker while at home, and states, \"it's only a couple of beers a day\". CIWA assessment performed and no other acute s/s of alcohol withdraw noted at this time.  Medicated per PRN order with IVP Hydralazine. Will recheck BP in 30 minutes  
Physical Therapy  Physical Therapy Initial Evaluation/Plan of Care    Room #:  0634/0634-01  Patient Name: Patsy Kennedy  YOB: 1970  MRN: [id number]    Date of Service: 6/2/2025     Tentative placement recommendation: Home with no Physical Therapy needs  Equipment recommendation: None at this time      Evaluating Physical Therapist: Estrada Gaspar, PT, DPT #[id number]      Specific Provider Orders/Date/Referring Provider :     06/01/25 1200    PT eval and treat  Start:  06/01/25 1200,   End:  06/01/25 1200,   ONE TIME,   Standing Count:  1 Occurrences,   R       Lamont Flores,  Acknowledge New    Admitting Diagnosis:   Hyponatremia [E87.1]  Hypoxia [R09.02]  Human metapneumovirus (hMPV) pneumonia [J12.3]  Pneumonia of both lungs due to infectious organism, unspecified part of lung [J18.9]  Community acquired pneumonia, bilateral [J18.9]  Community acquired pneumonia of both lower lobes [J18.9]      Surgery: none  Visit Diagnoses         Codes      Human metapneumovirus (hMPV) pneumonia    -  Primary J12.3      Hypoxia     R09.02      Pneumonia of both lungs due to infectious organism, unspecified part of lung     J18.9      Hyponatremia     E87.1            Patient Active Problem List   Diagnosis    Knee pain, bilateral    Recurrent cold sores    Maxillary sinusitis    Midline low back pain without sciatica    Tobacco abuse counseling    Chronic pain of left knee    Numbness and tingling of right leg    Impingement syndrome of left shoulder    S/P total left hip arthroplasty    Community acquired pneumonia of both lower lobes    Community acquired pneumonia, bilateral        ASSESSMENT of Current Deficits Patient exhibits decreased strength, balance, and endurance impairing functional mobility, transfers, gait , gait distance, and tolerance to activity. Pt generally steady with no LOB, dizziness, and mild SOB with activity. Pt SpO2 ranged from 90-96% on 4.5L with activity.       PHYSICAL THERAPY  PLAN 
Physical Therapy  Physical Therapy Treatment Note/Plan of Care    Room #:  0634/0634-01  Patient Name: Patsy Kennedy  YOB: 1970  MRN: [id number]    Date of Service: 6/3/2025     Tentative placement recommendation: Home with no Physical Therapy needs  Equipment recommendation: None at this time      Evaluating Physical Therapist: Estrada Gaspar, PT, DPT #[id number]      Specific Provider Orders/Date/Referring Provider :     06/01/25 1200    PT eval and treat  Start:  06/01/25 1200,   End:  06/01/25 1200,   ONE TIME,   Standing Count:  1 Occurrences,   R       Lamont Flores,  Acknowledge New    Admitting Diagnosis:   Hyponatremia [E87.1]  Hypoxia [R09.02]  Human metapneumovirus (hMPV) pneumonia [J12.3]  Pneumonia of both lungs due to infectious organism, unspecified part of lung [J18.9]  Community acquired pneumonia, bilateral [J18.9]  Community acquired pneumonia of both lower lobes [J18.9]      Surgery: none  Visit Diagnoses         Codes      Human metapneumovirus (hMPV) pneumonia    -  Primary J12.3      Hypoxia     R09.02      Pneumonia of both lungs due to infectious organism, unspecified part of lung     J18.9      Hyponatremia     E87.1            Patient Active Problem List   Diagnosis    Knee pain, bilateral    Recurrent cold sores    Maxillary sinusitis    Midline low back pain without sciatica    Tobacco abuse counseling    Chronic pain of left knee    Numbness and tingling of right leg    Impingement syndrome of left shoulder    S/P total left hip arthroplasty    Community acquired pneumonia of both lower lobes    Community acquired pneumonia, bilateral        ASSESSMENT of Current Deficits Patient exhibits decreased strength, balance, and endurance impairing functional mobility, transfers, gait , gait distance, and tolerance to activity. Patient was able to ambulate with good pacing and steadily within room, she was able to maintain control of oxygen line while ambulating. Patient was able 
Pulse ox was 90% on room air at rest.  Ambulated patient on room air.  Oxygen saturation was 87% on room air while ambulating.  Oxygen applied at 2L  Recovery pulse ox was 91% on 2 liters of oxygen while ambulating.  
Spiritual Health History and Assessment/Progress Note  [facility]    Initial Encounter,  ,  ,      Name: Patsy Kennedy MRN: [id number]    Age: 54 y.o.     Sex: female   Language: English   Sabianism: None   Community acquired pneumonia of both lower lobes     Date: 5/30/2025                           Spiritual Assessment began in [facility] 2 ICU        Referral/Consult From: Rounding   Encounter Overview/Reason: Initial Encounter  Service Provided For: Patient    Kelli, Belief, Meaning:   Patient has beliefs or practices that help with coping during difficult times  Family/Friends No family/friends present      Importance and Influence:  Patient has spiritual/personal beliefs that influence decisions regarding their health  Family/Friends No family/friends present    Community:  Patient feels well-supported. Support system includes: Extended family  Family/Friends No family/friends present    Assessment and Plan of Care:     Patient Interventions include: Facilitated expression of thoughts and feelings  Family/Friends Interventions include: No family/friends present    Patient Plan of Care: Spiritual Care available upon further referral  Family/Friends Plan of Care: No family/friends present    Electronically signed by Chaplain Shelbi on 5/30/2025 at 5:52 PM    
Transferred to ICU from ED transported with 2 CCRN. VSS, hooked up to ICU monitor.  
42.03  ADL Inpatient CMS 0-100% Score: 38.32  ADL Inpatient CMS G-Code Modifier : CJ         Functional Assessment:    Initial Eval Status  Date: 6/3/25 Treatment Status  Date: STGs = LTGs  Time frame: 10-14 days   Feeding Independent   Independent    Grooming Supervision   Independent    UB Dressing Independent   Independent    LB Dressing Supervision   Independent    Bathing Supervision  Independent    Toileting Supervision   Independent    Bed Mobility  Supine to sit: Independent   Sit to supine: Independent   Rolling:Independent    Supine to sit: Independent   Sit to supine: Independent   Rolling:Modified [place]     Functional Transfers Supervision from EOB and bedside chair sit to stand.   Transfer training with verbal cues for hand placement throughout session to improve safety.   Modified [place]    Functional Mobility Supervision with no AD; Unsteadiness to bedside chair.   Independent    Balance Sitting:     Static: good     Dynamic: good   Standing: fair  with no AD  Sitting:     Static: good     Dynamic: good   Standing: good  with no AD   Activity Tolerance fair -  good    Visual/  Perceptual Glasses: Readers                Hand Dominance: R     AROM (PROM) Strength Additional Info:  Goal:   RUE  WFL 4/5 good  and wfl FMC/dexterity noted during ADL tasks   Improve overall RUE strength  for participation in functional tasks   LUE WFL 4/5 good  and wfl FMC/dexterity noted during ADL tasks   Improve overall LUE strength  for participation in functional tasks      Vitals:   HR with activity: 113 bpm     SpO2 with activity: 93%      Hearing: WFL   Sensation:  No c/o numbness or tingling  Tone: WFL   Edema: None    Comments: Nursing approved therapy session. Upon arrival the patient was supine with HOB elevated.  At end of session, patient was seated in bedside chair with call light and phone within reach, all lines and tubes intact.  Overall patient demonstrated decreased independence and 
diffuse airspace disease without effusion. Upper Abdomen: Limited images of the upper abdomen are unremarkable. Soft Tissues/Bones: No acute bone or soft tissue abnormality.     1. No acute pulmonary artery embolism. 2. Bilateral pulmonary opacifications with interlobular septal thickening favoring edema versus atypical airspace disease. No pleural effusion.     XR CHEST (2 VW)  Result Date: 5/28/2025  EXAMINATION: TWO XRAY VIEWS OF THE CHEST 5/28/2025 4:48 pm COMPARISON: 02/14/2022 HISTORY: ORDERING SYSTEM PROVIDED HISTORY: Shortness of breath TECHNOLOGIST PROVIDED HISTORY: Reason for exam:->Shortness of breath FINDINGS: Bilateral lower lobe opacities worrisome for pneumonia.  There is no effusion or pneumothorax. The cardiomediastinal silhouette is without acute process. The osseous structures are without acute process.     Bilateral lower lobe opacities worrisome for pneumonia.       CBC with Differential:    Lab Results   Component Value Date/Time    WBC 9.4 06/02/2025 04:31 AM    RBC 4.10 06/02/2025 04:31 AM    HGB 14.0 06/02/2025 04:31 AM    HCT 40.4 06/02/2025 04:31 AM     06/02/2025 04:31 AM    MCV 98.5 06/02/2025 04:31 AM    MCH 34.1 06/02/2025 04:31 AM    MCHC 34.7 06/02/2025 04:31 AM    RDW 11.6 06/02/2025 04:31 AM    LYMPHOPCT 13 06/02/2025 04:31 AM    MONOPCT 3 06/02/2025 04:31 AM    EOSPCT 0 06/02/2025 04:31 AM    BASOPCT 0 06/02/2025 04:31 AM    MONOSABS 0.27 06/02/2025 04:31 AM    LYMPHSABS 1.23 06/02/2025 04:31 AM    EOSABS 0.00 06/02/2025 04:31 AM    BASOSABS 0.04 06/02/2025 04:31 AM     CMP:    Lab Results   Component Value Date/Time     06/02/2025 04:31 AM    K 4.6 06/02/2025 04:31 AM    K 3.7 03/14/2022 01:45 PM    CL 98 06/02/2025 04:31 AM    CO2 22 06/02/2025 04:31 AM    BUN 8 06/02/2025 04:31 AM    CREATININE 0.5 06/02/2025 04:31 AM    GFRAA >60 03/18/2022 07:56 AM    LABGLOM >90 06/02/2025 04:31 AM    GLUCOSE 122 06/02/2025 04:31 AM    CALCIUM 9.1 06/02/2025 04:31 AM    BILITOT 
process.     Bilateral lower lobe opacities worrisome for pneumonia.       CBC with Differential:    Lab Results   Component Value Date/Time    WBC 24.1 05/30/2025 04:28 AM    RBC 3.84 05/30/2025 04:28 AM    HGB 13.2 05/30/2025 04:28 AM    HCT 37.1 05/30/2025 04:28 AM     05/30/2025 04:28 AM    MCV 96.6 05/30/2025 04:28 AM    MCH 34.4 05/30/2025 04:28 AM    MCHC 35.6 05/30/2025 04:28 AM    RDW 11.4 05/30/2025 04:28 AM    LYMPHOPCT 4 05/30/2025 04:28 AM    MONOPCT 0 05/30/2025 04:28 AM    EOSPCT 0 05/30/2025 04:28 AM    BASOPCT 0 05/30/2025 04:28 AM    MONOSABS 0.00 05/30/2025 04:28 AM    LYMPHSABS 1.05 05/30/2025 04:28 AM    EOSABS 0.00 05/30/2025 04:28 AM    BASOSABS 0.00 05/30/2025 04:28 AM     CMP:    Lab Results   Component Value Date/Time     05/30/2025 04:28 AM    K 4.3 05/30/2025 04:28 AM    K 3.7 03/14/2022 01:45 PM    CL 96 05/30/2025 04:28 AM    CO2 23 05/30/2025 04:28 AM    BUN 7 05/30/2025 04:28 AM    CREATININE 0.5 05/30/2025 04:28 AM    GFRAA >60 03/18/2022 07:56 AM    LABGLOM >90 05/30/2025 04:28 AM    GLUCOSE 136 05/30/2025 04:28 AM    CALCIUM 8.9 05/30/2025 04:28 AM    BILITOT 0.2 05/30/2025 04:28 AM    ALKPHOS 79 05/30/2025 04:28 AM    AST 63 05/30/2025 04:28 AM    ALT 24 05/30/2025 04:28 AM       CLINICAL ASSESMENT & PLAN:    Acute Hypoxic Respiratory Failure  Human [place] Pneumovirus  Mild hyponatremia- resolved  Leukocytosis with a shift to the Left  Community Acquired Pneumonia  Hypertension-home Toprol XL  Arthritis  Herpes simples virus- on Valacyclovir    Acute Hypoxic Respiratory Failure  Human [place] Pneumovirus  Mild hyponatremia- resolved  Leukocytosis with a shift to the Left  Community Acquired Pneumonia  Hypertension-home Toprol XL  Arthritis  Herpes simples virus- on Valacyclovir    This patient is unstable and critically ill. There are life and organ supporting interventions that require frequent monitoring and personal assessment. There is a high possibility of sudden, 
versus atypical diffuse airspace disease without effusion. Upper Abdomen: Limited images of the upper abdomen are unremarkable. Soft Tissues/Bones: No acute bone or soft tissue abnormality.     1. No acute pulmonary artery embolism. 2. Bilateral pulmonary opacifications with interlobular septal thickening favoring edema versus atypical airspace disease. No pleural effusion.     XR CHEST (2 VW)  Result Date: 5/28/2025  EXAMINATION: TWO XRAY VIEWS OF THE CHEST 5/28/2025 4:48 pm COMPARISON: 02/14/2022 HISTORY: ORDERING SYSTEM PROVIDED HISTORY: Shortness of breath TECHNOLOGIST PROVIDED HISTORY: Reason for exam:->Shortness of breath FINDINGS: Bilateral lower lobe opacities worrisome for pneumonia.  There is no effusion or pneumothorax. The cardiomediastinal silhouette is without acute process. The osseous structures are without acute process.     Bilateral lower lobe opacities worrisome for pneumonia.       CBC with Differential:    Lab Results   Component Value Date/Time    WBC 12.2 06/03/2025 07:01 AM    RBC 3.97 06/03/2025 07:01 AM    HGB 13.5 06/03/2025 07:01 AM    HCT 38.7 06/03/2025 07:01 AM     06/03/2025 07:01 AM    MCV 97.5 06/03/2025 07:01 AM    MCH 34.0 06/03/2025 07:01 AM    MCHC 34.9 06/03/2025 07:01 AM    RDW 11.5 06/03/2025 07:01 AM    METASPCT 1 06/03/2025 07:01 AM    LYMPHOPCT 15 06/03/2025 07:01 AM    MONOPCT 5 06/03/2025 07:01 AM    EOSPCT 0 06/03/2025 07:01 AM    BASOPCT 0 06/03/2025 07:01 AM    MONOSABS 0.62 06/03/2025 07:01 AM    LYMPHSABS 1.85 06/03/2025 07:01 AM    EOSABS 0.00 06/03/2025 07:01 AM    BASOSABS 0.00 06/03/2025 07:01 AM     CMP:    Lab Results   Component Value Date/Time     06/03/2025 07:01 AM    K 4.2 06/03/2025 07:01 AM    K 3.7 03/14/2022 01:45 PM    CL 97 06/03/2025 07:01 AM    CO2 22 06/03/2025 07:01 AM    BUN 9 06/03/2025 07:01 AM    CREATININE 0.6 06/03/2025 07:01 AM    GFRAA >60 03/18/2022 07:56 AM    LABGLOM >90 06/03/2025 07:01 AM    GLUCOSE 103 06/03/2025

## 2025-06-03 NOTE — DISCHARGE SUMMARY
Internal Medicine Progress Note     [facility]     Carloz Moreland D.O., LAVELLOMary AliceI.                         Lamont Flores D.O., LAVELLOMary AliceIMary Alice Miller D.O.     Crow Mccarthy, MSN, APRN-CNP  Elliot Jean, MSN, APRN, NP-C  Georgina Mak, MSN, APRN-CNP  Patsy Kumar, MSN, APRN, NP-C       Internal Medicine  Discharge Summary    NAME: Patsy Kennedy  :  1970  MRN:  [id number]  PCP:Edwar Shah DO  ADMITTED: 2025      DISCHARGED: 6/3/25    ADMITTING PHYSICIAN: Carloz Moreland DO    CONSULTANT(S):   IP CONSULT TO CRITICAL CARE     ADMITTING DIAGNOSIS:   Hyponatremia [E87.1]  Hypoxia [R09.02]  Human metapneumovirus (hMPV) pneumonia [J12.3]  Pneumonia of both lungs due to infectious organism, unspecified part of lung [J18.9]  Community acquired pneumonia, bilateral [J18.9]  Community acquired pneumonia of both lower lobes [J18.9]     DISCHARGE DIAGNOSES:   Acute respiratory failure with hypoxia secondary to viral pneumonia with human metapneumovirus and mycoplasma pneumonia  Acute exacerbation of COPD in the setting of ongoing tobacco abuse  Essential hypertension  Arthritis on oxycodone  Herpes simplex virus on valacyclovir  Current tobacco and marijuana use    BRIEF HISTORY OF PRESENT ILLNESS:   Patient presents to the emergency room due to nasal congestion, shortness of breath, fatigue,non-productive cough, fever/chills and fatigue starting almost a week ago she went to her PCP who prescribed her prednisone yesterday.  She states she normally does not wear oxygen but they placed on her in the emergency room.  She denies contact with sick persons, exposure to VOCs/chemical fumes, or recent travel.  She denies any new allergies.  She denies recent medication changes-states she takes her valacyclovir as needed.  There are no family members present, all questions answered at this time. Patient does not use alcohol or illicit drugs; she does endorse smoking half a

## 2025-06-04 ENCOUNTER — TELEPHONE (OUTPATIENT)
Dept: FAMILY MEDICINE CLINIC | Age: 55
End: 2025-06-04

## 2025-06-04 LAB
MICROORGANISM SPEC CULT: NORMAL
MICROORGANISM SPEC CULT: NORMAL
SERVICE CMNT-IMP: NORMAL
SERVICE CMNT-IMP: NORMAL
SPECIMEN DESCRIPTION: NORMAL
SPECIMEN DESCRIPTION: NORMAL

## 2025-06-04 NOTE — TELEPHONE ENCOUNTER
Care Transitions Initial Follow Up Call    Outreach made within 2 business days of discharge: Yes    Patient: Patsy Kennedy Patient : 1970   MRN: [id number]  Reason for Admission:   Discharge Date: 6/3/25       Spoke with: L/LOKESH for patient to call        Scheduled appointment with PCP within 7-14 days    Follow Up  No future appointments.    Maricel Fuentes MA

## 2025-06-04 NOTE — TELEPHONE ENCOUNTER
Care Transitions Initial Follow Up Call    Outreach made within 2 business days of discharge: Yes    Patient: Patsy Kennedy Patient : 1970   MRN: [id number]  Reason for Admission:   Discharge Date: 6/3/25       Spoke with: L/LOKESH to call office         Scheduled appointment with PCP within 7-14 days    Follow Up  No future appointments.    Maricel Fuentes MA

## 2025-06-05 LAB
A FUMIGATUS1 AB SER QL ID: NORMAL
A FUMIGATUS6 AB SER QL ID: NORMAL
A PULLULANS AB SER QL ID: NORMAL
PIGEON SERUM AB QL ID: NORMAL
S RECTIVIRGULA AB SER QL ID: NORMAL

## 2025-06-10 ENCOUNTER — OFFICE VISIT (OUTPATIENT)
Dept: FAMILY MEDICINE CLINIC | Age: 55
End: 2025-06-10
Payer: COMMERCIAL

## 2025-06-10 VITALS
WEIGHT: 163 LBS | HEIGHT: 62 IN | RESPIRATION RATE: 16 BRPM | TEMPERATURE: 97.1 F | DIASTOLIC BLOOD PRESSURE: 76 MMHG | HEART RATE: 75 BPM | SYSTOLIC BLOOD PRESSURE: 136 MMHG | BODY MASS INDEX: 30 KG/M2 | OXYGEN SATURATION: 98 %

## 2025-06-10 DIAGNOSIS — R20.0 FACIAL NUMBNESS: ICD-10-CM

## 2025-06-10 DIAGNOSIS — J44.1 CHRONIC OBSTRUCTIVE PULMONARY DISEASE WITH ACUTE EXACERBATION (HCC): ICD-10-CM

## 2025-06-10 DIAGNOSIS — R05.1 ACUTE COUGH: Primary | ICD-10-CM

## 2025-06-10 DIAGNOSIS — I10 PRIMARY HYPERTENSION: ICD-10-CM

## 2025-06-10 DIAGNOSIS — J02.9 SORE THROAT: ICD-10-CM

## 2025-06-10 DIAGNOSIS — B37.0 ORAL THRUSH: ICD-10-CM

## 2025-06-10 PROCEDURE — 3078F DIAST BP <80 MM HG: CPT | Performed by: NURSE PRACTITIONER

## 2025-06-10 PROCEDURE — 3075F SYST BP GE 130 - 139MM HG: CPT | Performed by: NURSE PRACTITIONER

## 2025-06-10 PROCEDURE — 99214 OFFICE O/P EST MOD 30 MIN: CPT | Performed by: NURSE PRACTITIONER

## 2025-06-10 RX ORDER — GUAIFENESIN 200 MG/10ML
200 LIQUID ORAL EVERY 4 HOURS PRN
Qty: 473 ML | Refills: 0 | Status: SHIPPED | OUTPATIENT
Start: 2025-06-10

## 2025-06-10 RX ORDER — NYSTATIN 100000 [USP'U]/ML
500000 SUSPENSION ORAL 4 TIMES DAILY
Qty: 200 ML | Refills: 0 | Status: SHIPPED | OUTPATIENT
Start: 2025-06-10

## 2025-06-10 RX ORDER — VALACYCLOVIR HYDROCHLORIDE 500 MG/1
500 TABLET, FILM COATED ORAL PRN
COMMUNITY
Start: 2025-05-19

## 2025-06-10 ASSESSMENT — ENCOUNTER SYMPTOMS
CHEST TIGHTNESS: 1
BACK PAIN: 0
SORE THROAT: 0
VOMITING: 0
NAUSEA: 0
CONSTIPATION: 0
WHEEZING: 1
SHORTNESS OF BREATH: 1
DIARRHEA: 0
COUGH: 1

## 2025-06-10 ASSESSMENT — PATIENT HEALTH QUESTIONNAIRE - PHQ9
SUM OF ALL RESPONSES TO PHQ QUESTIONS 1-9: 0
1. LITTLE INTEREST OR PLEASURE IN DOING THINGS: NOT AT ALL
2. FEELING DOWN, DEPRESSED OR HOPELESS: NOT AT ALL
SUM OF ALL RESPONSES TO PHQ QUESTIONS 1-9: 0

## 2025-06-10 NOTE — PROGRESS NOTES
reveals wheezing. Wheezing present.   Abdominal:      General: Bowel sounds are normal.      Palpations: Abdomen is soft.      Tenderness: There is no abdominal tenderness.   Musculoskeletal:         General: Normal range of motion.   Skin:     General: Skin is warm and dry.   Neurological:      Mental Status: She is alert and oriented to person, place, and time.   Psychiatric:         Behavior: Behavior normal.            MDM Mod      An electronic signature was used to authenticate this note.    --RAPHAEL Haskins - CNP     The patient (or guardian, if applicable) and other individuals in attendance with the patient were advised that Artificial Intelligence will be utilized during this visit to record, process the conversation to generate a clinical note, and support improvement of the AI technology. The patient (or guardian, if applicable) and other individuals in attendance at the appointment consented to the use of AI, including the recording.

## 2025-06-13 PROBLEM — J44.1 CHRONIC OBSTRUCTIVE PULMONARY DISEASE WITH ACUTE EXACERBATION (HCC): Status: ACTIVE | Noted: 2025-06-13

## 2025-06-13 PROBLEM — I10 PRIMARY HYPERTENSION: Status: ACTIVE | Noted: 2025-06-13

## 2025-06-24 ENCOUNTER — TELEPHONE (OUTPATIENT)
Dept: FAMILY MEDICINE CLINIC | Age: 55
End: 2025-06-24

## 2025-06-24 NOTE — TELEPHONE ENCOUNTER
Patient returned call. She states first day was 5/27/25. Her return date is TBD at her future appointment with Ana on 6/26/25 at 230. I asked her if she already had a standing return date and she wanted to discuss a different date, patient said no that it is just to be discussed at upcoming appointment.

## 2025-06-26 ENCOUNTER — OFFICE VISIT (OUTPATIENT)
Dept: FAMILY MEDICINE CLINIC | Age: 55
End: 2025-06-26
Payer: COMMERCIAL

## 2025-06-26 VITALS
RESPIRATION RATE: 16 BRPM | BODY MASS INDEX: 30.18 KG/M2 | HEART RATE: 90 BPM | DIASTOLIC BLOOD PRESSURE: 76 MMHG | HEIGHT: 62 IN | TEMPERATURE: 98 F | SYSTOLIC BLOOD PRESSURE: 138 MMHG | WEIGHT: 164 LBS | OXYGEN SATURATION: 96 %

## 2025-06-26 DIAGNOSIS — R20.0 RIGHT FACIAL NUMBNESS: ICD-10-CM

## 2025-06-26 DIAGNOSIS — B37.0 ORAL CANDIDA: ICD-10-CM

## 2025-06-26 DIAGNOSIS — J44.1 CHRONIC OBSTRUCTIVE PULMONARY DISEASE WITH ACUTE EXACERBATION (HCC): Primary | ICD-10-CM

## 2025-06-26 DIAGNOSIS — R19.7 DIARRHEA, UNSPECIFIED TYPE: ICD-10-CM

## 2025-06-26 PROCEDURE — 3078F DIAST BP <80 MM HG: CPT | Performed by: NURSE PRACTITIONER

## 2025-06-26 PROCEDURE — 3075F SYST BP GE 130 - 139MM HG: CPT | Performed by: NURSE PRACTITIONER

## 2025-06-26 PROCEDURE — 99213 OFFICE O/P EST LOW 20 MIN: CPT | Performed by: NURSE PRACTITIONER

## 2025-06-26 RX ORDER — GABAPENTIN 100 MG/1
CAPSULE ORAL
COMMUNITY
Start: 2025-06-16

## 2025-06-26 ASSESSMENT — PATIENT HEALTH QUESTIONNAIRE - PHQ9
SUM OF ALL RESPONSES TO PHQ QUESTIONS 1-9: 0
1. LITTLE INTEREST OR PLEASURE IN DOING THINGS: NOT AT ALL
2. FEELING DOWN, DEPRESSED OR HOPELESS: NOT AT ALL

## 2025-06-26 ASSESSMENT — ENCOUNTER SYMPTOMS
BACK PAIN: 0
SHORTNESS OF BREATH: 1
DIARRHEA: 1
WHEEZING: 0
COUGH: 0
CHEST TIGHTNESS: 0
NAUSEA: 0
VOMITING: 0
CONSTIPATION: 0

## (undated) DEVICE — BANDAGE COMPR W6INXL5YD SELF ADH COHESIVE CO FLX

## (undated) DEVICE — ELECTRODE PT RET AD L9FT HI MOIST COND ADH HYDRGEL CORDED

## (undated) DEVICE — SET EXTN L14IN 1ML IV L BOR NO FLTR NO STPCOCK

## (undated) DEVICE — ROLL COT W11.5INXL11FT 1LB HIGHLY ABSRB SURG GRD

## (undated) DEVICE — GOWN,SIRUS,FABRNF,XL,20/CS: Brand: MEDLINE

## (undated) DEVICE — NEEDLE SYR 18GA L1.5IN RED PLAS HUB S STL BLNT FILL W/O

## (undated) DEVICE — SET SURG INSTR DISSECT

## (undated) DEVICE — BANDAGE COMPR L W4INXL11YD 100% COT WVN E DBL LEN CLP CLSR

## (undated) DEVICE — SUTURE N ABSRB 5-0 1.7 MM W/NDL TAPE WHT BLU AR7511

## (undated) DEVICE — SYRINGE IRRIG 60ML SFT PLIABLE BLB EZ TO GRP 1 HND USE W/

## (undated) DEVICE — NDL CNTR 40CT FM MAG: Brand: MEDLINE INDUSTRIES, INC.

## (undated) DEVICE — INTENDED FOR TISSUE SEPARATION, AND OTHER PROCEDURES THAT REQUIRE A SHARP SURGICAL BLADE TO PUNCTURE OR CUT.: Brand: BARD-PARKER ® STAINLESS STEEL BLADES

## (undated) DEVICE — SPONGE LAP W18XL18IN WHT COT 4 PLY FLD STRUNG RADPQ DISP ST

## (undated) DEVICE — TUBING, SUCTION, 1/4" X 10', STRAIGHT: Brand: MEDLINE

## (undated) DEVICE — CANNULA IV 18GA L15IN BLNT FILL LUERLOCK HUB MJCT

## (undated) DEVICE — BLADE SHVR AGRSV + RED BL 4.0MM

## (undated) DEVICE — SUTURE SUTTAPE L40IN DIA1.3MM NONABSORBABLE WHT BLU L26.5MM AR7500

## (undated) DEVICE — SOLUTION IV IRRIG LACTATED RINGERS 3000ML 2B7487

## (undated) DEVICE — BLADE SAW W12.5XL70MM THK0.8MM CUT THK1.12MM S STL RECIP

## (undated) DEVICE — 3 BONE CEMENT MIXER: Brand: MIXEVAC

## (undated) DEVICE — PENCIL ES L3M BTTN SWCH HOLSTER W/ BLDE ELECTRD EDGE

## (undated) DEVICE — Z DISCONTINUED USE 2275686 GLOVE SURG SZ 8 L12IN FNGR THK13MIL WHT ISOLEX POLYISOPRENE

## (undated) DEVICE — PADDING,UNDERCAST,COTTON, 4"X4YD STERILE: Brand: MEDLINE

## (undated) DEVICE — TOWEL,OR,DSP,ST,BLUE,STD,6/PK,12PK/CS: Brand: MEDLINE

## (undated) DEVICE — DOUBLE BASIN SET: Brand: MEDLINE INDUSTRIES, INC.

## (undated) DEVICE — TUBING PMP L16FT MAIN DISP FOR AR-6400 AR-6475

## (undated) DEVICE — SHEET SUPPORT

## (undated) DEVICE — DRESSING PETRO W3XL3IN OIL EMUL N ADH GZ KNIT IMPREG CELOS

## (undated) DEVICE — GAUZE,SPONGE,4"X4",16PLY,STRL,LF,10/TRAY: Brand: MEDLINE

## (undated) DEVICE — HANDPIECE SET WITH BONE CLEANING TIP: Brand: INTERPULSE

## (undated) DEVICE — BLADE ES L6IN ELASTOMERIC COAT EXT DURABLE BEND UPTO 90DEG

## (undated) DEVICE — 3M™ STERI-DRAPE™ U-DRAPE 1015: Brand: STERI-DRAPE™

## (undated) DEVICE — Z DISCONTINUED PER MEDLINE USE 2741944 DRESSING AQUACEL 12 IN SURG W9XL30CM SIL CVR WTRPRF VIR BACT BARR ANTIMIC

## (undated) DEVICE — BANDAGE COMPR W6XL12FT SGL LAYERED NO CLSR EXSANGUATION

## (undated) DEVICE — Z DISCONTINUED USE 2272124 DRAPE SURG XL N INVASIVE 2 LAYR DISP

## (undated) DEVICE — BANDAGE COMPR W4INXL5YD WHT BGE POLY COT M E WRP WV HK AND

## (undated) DEVICE — YANKAUER,BULB TIP,W/O VENT,RIGID,STERILE: Brand: MEDLINE

## (undated) DEVICE — PITCHER PT 1200ML W HNDL CSR WRP

## (undated) DEVICE — SYRINGE MED 10ML LUERLOCK TIP W/O SFTY DISP

## (undated) DEVICE — PACK,EXTREMITY,ORTHOMAX,5/CS: Brand: MEDLINE

## (undated) DEVICE — PAD,ABDOMINAL,8"X10",ST,LF: Brand: MEDLINE

## (undated) DEVICE — SET MAJOR INSTR ORTHO

## (undated) DEVICE — T4 HOOD

## (undated) DEVICE — MARKER,SKIN,WI/RULER AND LABELS: Brand: MEDLINE

## (undated) DEVICE — SHEET,DRAPE,40X58,STERILE: Brand: MEDLINE

## (undated) DEVICE — SYRINGE MED 50ML LUERLOCK TIP

## (undated) DEVICE — COVER,LIGHT HANDLE,FLX,1/PK: Brand: MEDLINE INDUSTRIES, INC.

## (undated) DEVICE — Device

## (undated) DEVICE — GAUZE,SPONGE,4"X4",16PLY,XRAY,STRL,LF: Brand: MEDLINE

## (undated) DEVICE — 2108 SERIES SAGITTAL BLADE FLARED, GROUND  (25.0 X 0.96 X 89.5MM)

## (undated) DEVICE — BANDAGE COMPR W6INXL12FT SMOOTH FOR LIMB EXSANG ESMARCH

## (undated) DEVICE — 3M™ IOBAN™ 2 ANTIMICROBIAL INCISE DRAPE 6650EZ: Brand: IOBAN™ 2

## (undated) DEVICE — GOWN,SIRUS,POLYRNF,BRTHSLV,XLN/XL,20/CS: Brand: MEDLINE

## (undated) DEVICE — CAMERA STRYKER 1488 HD GEN

## (undated) DEVICE — HYPODERMIC SAFETY NEEDLE: Brand: MAGELLAN

## (undated) DEVICE — APPLICATOR MEDICATED 26 CC SOLUTION HI LT ORNG CHLORAPREP

## (undated) DEVICE — TOTAL TRAY, 16FR 10ML SIL FOLEY, URN: Brand: MEDLINE

## (undated) DEVICE — NEEDLE SPNL L3.5IN PNK HUB S STL REG WALL FIT STYL W/ QNCKE

## (undated) DEVICE — COVER,TABLE,60X90,STERILE: Brand: MEDLINE